# Patient Record
Sex: FEMALE | Race: WHITE | Employment: UNEMPLOYED | URBAN - METROPOLITAN AREA
[De-identification: names, ages, dates, MRNs, and addresses within clinical notes are randomized per-mention and may not be internally consistent; named-entity substitution may affect disease eponyms.]

---

## 2017-05-18 ENCOUNTER — HOSPITAL ENCOUNTER (OUTPATIENT)
Age: 77
Setting detail: SPECIMEN
Discharge: HOME | End: 2017-05-18
Payer: MEDICARE

## 2017-05-18 LAB
ALBUMIN SERPL-MCNC: 3.3 G/DL (ref 3.5–5.2)
ALBUMIN/GLOBULIN RATIO: 1.1 (ref 1–2.5)
ALP BLD-CCNC: 62 U/L (ref 35–104)
ALT SERPL-CCNC: 6 U/L (ref 5–33)
ANION GAP SERPL CALCULATED.3IONS-SCNC: 16 MMOL/L (ref 9–17)
AST SERPL-CCNC: 15 U/L
BILIRUB SERPL-MCNC: 0.35 MG/DL (ref 0.3–1.2)
BILIRUBIN DIRECT: <0.08 MG/DL
BILIRUBIN, INDIRECT: ABNORMAL MG/DL (ref 0–1)
BUN BLDV-MCNC: 16 MG/DL (ref 8–23)
BUN/CREAT BLD: ABNORMAL (ref 9–20)
CALCIUM SERPL-MCNC: 8.8 MG/DL (ref 8.6–10.4)
CHLORIDE BLD-SCNC: 105 MMOL/L (ref 98–107)
CHOLESTEROL/HDL RATIO: 2.7
CHOLESTEROL: 120 MG/DL
CO2: 23 MMOL/L (ref 20–31)
CREAT SERPL-MCNC: 0.81 MG/DL (ref 0.5–0.9)
ESTIMATED AVERAGE GLUCOSE: 74 MG/DL
GFR AFRICAN AMERICAN: >60 ML/MIN
GFR NON-AFRICAN AMERICAN: >60 ML/MIN
GFR SERPL CREATININE-BSD FRML MDRD: ABNORMAL ML/MIN/{1.73_M2}
GFR SERPL CREATININE-BSD FRML MDRD: ABNORMAL ML/MIN/{1.73_M2}
GLOBULIN: ABNORMAL G/DL (ref 1.5–3.8)
GLUCOSE BLD-MCNC: 69 MG/DL (ref 70–99)
HAV IGM SER IA-ACNC: NONREACTIVE
HBA1C MFR BLD: 4.2 % (ref 4–6)
HCT VFR BLD CALC: 28.1 % (ref 36–46)
HDLC SERPL-MCNC: 45 MG/DL
HEMOGLOBIN: 9.2 G/DL (ref 12–16)
HEPATITIS B CORE IGM ANTIBODY: NONREACTIVE
HEPATITIS B SURFACE ANTIGEN: NONREACTIVE
HEPATITIS C ANTIBODY: NONREACTIVE
LDL CHOLESTEROL: 55 MG/DL (ref 0–130)
MCH RBC QN AUTO: 32.5 PG (ref 26–34)
MCHC RBC AUTO-ENTMCNC: 32.9 G/DL (ref 31–37)
MCV RBC AUTO: 98.7 FL (ref 80–100)
PDW BLD-RTO: 17.9 % (ref 12.5–15.4)
PLATELET # BLD: 133 K/UL (ref 140–450)
PMV BLD AUTO: 8 FL (ref 6–12)
POTASSIUM SERPL-SCNC: 4.1 MMOL/L (ref 3.7–5.3)
RBC # BLD: 2.84 M/UL (ref 4–5.2)
SODIUM BLD-SCNC: 144 MMOL/L (ref 135–144)
THYROXINE, FREE: 0.71 NG/DL (ref 0.93–1.7)
TOTAL PROTEIN: 6.2 G/DL (ref 6.4–8.3)
TRIGL SERPL-MCNC: 98 MG/DL
TSH SERPL DL<=0.05 MIU/L-ACNC: 1.17 MIU/L (ref 0.3–5)
VLDLC SERPL CALC-MCNC: NORMAL MG/DL (ref 1–30)
WBC # BLD: 2.3 K/UL (ref 3.5–11)

## 2017-05-18 PROCEDURE — 36415 COLL VENOUS BLD VENIPUNCTURE: CPT

## 2017-05-18 PROCEDURE — 84443 ASSAY THYROID STIM HORMONE: CPT

## 2017-05-18 PROCEDURE — 84439 ASSAY OF FREE THYROXINE: CPT

## 2017-05-18 PROCEDURE — P9603 ONE-WAY ALLOW PRORATED MILES: HCPCS

## 2017-05-18 PROCEDURE — 80061 LIPID PANEL: CPT

## 2017-05-18 PROCEDURE — 83036 HEMOGLOBIN GLYCOSYLATED A1C: CPT

## 2017-05-18 PROCEDURE — 80076 HEPATIC FUNCTION PANEL: CPT

## 2017-05-18 PROCEDURE — 80048 BASIC METABOLIC PNL TOTAL CA: CPT

## 2017-05-18 PROCEDURE — 80074 ACUTE HEPATITIS PANEL: CPT

## 2017-05-18 PROCEDURE — 85027 COMPLETE CBC AUTOMATED: CPT

## 2017-05-30 ENCOUNTER — HOSPITAL ENCOUNTER (OUTPATIENT)
Age: 77
Setting detail: SPECIMEN
Discharge: HOME | End: 2017-05-30
Payer: MEDICARE

## 2017-05-30 LAB
HCT VFR BLD CALC: 29.6 % (ref 36–46)
HEMOGLOBIN: 10 G/DL (ref 12–16)
MCH RBC QN AUTO: 32.9 PG (ref 26–34)
MCHC RBC AUTO-ENTMCNC: 33.8 G/DL (ref 31–37)
MCV RBC AUTO: 97.5 FL (ref 80–100)
PDW BLD-RTO: 17.3 % (ref 12.5–15.4)
PLATELET # BLD: 133 K/UL (ref 140–450)
PMV BLD AUTO: 7.7 FL (ref 6–12)
RBC # BLD: 3.03 M/UL (ref 4–5.2)
WBC # BLD: 3.1 K/UL (ref 3.5–11)

## 2017-05-30 PROCEDURE — P9603 ONE-WAY ALLOW PRORATED MILES: HCPCS

## 2017-05-30 PROCEDURE — 36415 COLL VENOUS BLD VENIPUNCTURE: CPT

## 2017-05-30 PROCEDURE — 85027 COMPLETE CBC AUTOMATED: CPT

## 2017-09-29 ENCOUNTER — HOSPITAL ENCOUNTER (OUTPATIENT)
Age: 77
Setting detail: SPECIMEN
Discharge: HOME | End: 2017-09-29
Payer: MEDICARE

## 2017-09-29 LAB
BUN BLDV-MCNC: 11 MG/DL (ref 8–23)
C-REACTIVE PROTEIN: 0.8 MG/L (ref 0–5)
CREAT SERPL-MCNC: 0.73 MG/DL (ref 0.5–0.9)
GFR AFRICAN AMERICAN: >60 ML/MIN
GFR NON-AFRICAN AMERICAN: >60 ML/MIN
GFR SERPL CREATININE-BSD FRML MDRD: NORMAL ML/MIN/{1.73_M2}
GFR SERPL CREATININE-BSD FRML MDRD: NORMAL ML/MIN/{1.73_M2}
SEDIMENTATION RATE, ERYTHROCYTE: 18 MM (ref 0–20)

## 2017-09-29 PROCEDURE — 86140 C-REACTIVE PROTEIN: CPT

## 2017-09-29 PROCEDURE — 82565 ASSAY OF CREATININE: CPT

## 2017-09-29 PROCEDURE — P9603 ONE-WAY ALLOW PRORATED MILES: HCPCS

## 2017-09-29 PROCEDURE — 84520 ASSAY OF UREA NITROGEN: CPT

## 2017-09-29 PROCEDURE — 85651 RBC SED RATE NONAUTOMATED: CPT

## 2017-09-29 PROCEDURE — 36415 COLL VENOUS BLD VENIPUNCTURE: CPT

## 2017-11-24 ENCOUNTER — HOSPITAL ENCOUNTER (OUTPATIENT)
Age: 77
Setting detail: SPECIMEN
Discharge: HOME | End: 2017-11-24
Payer: MEDICARE

## 2017-11-24 LAB
ABSOLUTE RETIC #: 0.08 M/UL (ref 0.02–0.1)
ALBUMIN SERPL-MCNC: 3.6 G/DL (ref 3.5–5.2)
ALBUMIN/GLOBULIN RATIO: 1.2 (ref 1–2.5)
ALP BLD-CCNC: 53 U/L (ref 35–104)
ALT SERPL-CCNC: <5 U/L (ref 5–33)
ANION GAP SERPL CALCULATED.3IONS-SCNC: 12 MMOL/L (ref 9–17)
AST SERPL-CCNC: 14 U/L
BILIRUB SERPL-MCNC: 0.26 MG/DL (ref 0.3–1.2)
BUN BLDV-MCNC: 18 MG/DL (ref 8–23)
BUN/CREAT BLD: ABNORMAL (ref 9–20)
CALCIUM SERPL-MCNC: 8.6 MG/DL (ref 8.6–10.4)
CHLORIDE BLD-SCNC: 107 MMOL/L (ref 98–107)
CO2: 26 MMOL/L (ref 20–31)
CREAT SERPL-MCNC: 0.75 MG/DL (ref 0.5–0.9)
DAT, POLYSPECIFIC: NEGATIVE
GFR AFRICAN AMERICAN: >60 ML/MIN
GFR NON-AFRICAN AMERICAN: >60 ML/MIN
GFR SERPL CREATININE-BSD FRML MDRD: ABNORMAL ML/MIN/{1.73_M2}
GFR SERPL CREATININE-BSD FRML MDRD: ABNORMAL ML/MIN/{1.73_M2}
GLUCOSE BLD-MCNC: 82 MG/DL (ref 70–99)
HAPTOGLOBIN: 19 MG/DL (ref 30–200)
HCT VFR BLD CALC: 33.4 % (ref 36–46)
HEMOGLOBIN: 11.4 G/DL (ref 12–16)
IMMATURE RETIC FRACT: ABNORMAL %
INR BLD: 0.9
MCH RBC QN AUTO: 32.2 PG (ref 26–34)
MCHC RBC AUTO-ENTMCNC: 34 G/DL (ref 31–37)
MCV RBC AUTO: 94.8 FL (ref 80–100)
PARTIAL THROMBOPLASTIN TIME: 21.7 SEC (ref 23–31)
PDW BLD-RTO: 15.1 % (ref 11.5–14.5)
PLATELET # BLD: 130 K/UL (ref 130–400)
PMV BLD AUTO: 7.7 FL (ref 6–12)
POTASSIUM SERPL-SCNC: 4.2 MMOL/L (ref 3.7–5.3)
PROTHROMBIN TIME: 9.7 SEC (ref 9.7–11.6)
RBC # BLD: 3.52 M/UL (ref 4–5.2)
RETIC %: 2.3 % (ref 0.5–2)
RETIC HEMOGLOBIN: ABNORMAL PG (ref 28.2–35.7)
SODIUM BLD-SCNC: 145 MMOL/L (ref 135–144)
TOTAL PROTEIN: 6.5 G/DL (ref 6.4–8.3)
WBC # BLD: 2.3 K/UL (ref 3.5–11)

## 2017-11-24 PROCEDURE — 85730 THROMBOPLASTIN TIME PARTIAL: CPT

## 2017-11-24 PROCEDURE — 86880 COOMBS TEST DIRECT: CPT

## 2017-11-24 PROCEDURE — 80053 COMPREHEN METABOLIC PANEL: CPT

## 2017-11-24 PROCEDURE — 85610 PROTHROMBIN TIME: CPT

## 2017-11-24 PROCEDURE — 85045 AUTOMATED RETICULOCYTE COUNT: CPT

## 2017-11-24 PROCEDURE — 85027 COMPLETE CBC AUTOMATED: CPT

## 2017-11-24 PROCEDURE — 86356 MONONUCLEAR CELL ANTIGEN: CPT

## 2017-11-24 PROCEDURE — 36415 COLL VENOUS BLD VENIPUNCTURE: CPT

## 2017-11-24 PROCEDURE — P9603 ONE-WAY ALLOW PRORATED MILES: HCPCS

## 2017-11-24 PROCEDURE — 83010 ASSAY OF HAPTOGLOBIN QUANT: CPT

## 2017-11-26 LAB — PERCENT PNH CELLS: 0 % (ref 0–0)

## 2017-11-29 ENCOUNTER — HOSPITAL ENCOUNTER (OUTPATIENT)
Dept: CT IMAGING | Age: 77
Discharge: HOME | End: 2017-11-29
Payer: MEDICARE

## 2017-11-29 VITALS
HEIGHT: 59 IN | HEART RATE: 74 BPM | OXYGEN SATURATION: 95 % | BODY MASS INDEX: 26.31 KG/M2 | RESPIRATION RATE: 14 BRPM | TEMPERATURE: 97.2 F | DIASTOLIC BLOOD PRESSURE: 71 MMHG | SYSTOLIC BLOOD PRESSURE: 162 MMHG | WEIGHT: 130.51 LBS

## 2017-11-29 DIAGNOSIS — Z98.890 S/P BIOPSY: ICD-10-CM

## 2017-11-29 LAB
ABSOLUTE EOS #: 0.1 K/UL (ref 0–0.4)
ABSOLUTE IMMATURE GRANULOCYTE: ABNORMAL K/UL (ref 0–0.3)
ABSOLUTE LYMPH #: 0.6 K/UL (ref 1–4.8)
ABSOLUTE MONO #: 0.3 K/UL (ref 0.2–0.8)
ABSOLUTE RETIC #: 0.1 M/UL (ref 0.02–0.1)
BASOPHILS # BLD: 0 % (ref 0–2)
BASOPHILS ABSOLUTE: 0 K/UL (ref 0–0.2)
DIFFERENTIAL TYPE: ABNORMAL
EOSINOPHILS RELATIVE PERCENT: 5 % (ref 1–4)
HCT VFR BLD CALC: 33.3 % (ref 36–46)
HEMOGLOBIN: 11.2 G/DL (ref 12–16)
IMMATURE GRANULOCYTES: ABNORMAL %
IMMATURE RETIC FRACT: ABNORMAL %
INR BLD: 0.9
LYMPHOCYTES # BLD: 26 % (ref 24–44)
MCH RBC QN AUTO: 32.1 PG (ref 26–34)
MCHC RBC AUTO-ENTMCNC: 33.7 G/DL (ref 31–37)
MCV RBC AUTO: 95.3 FL (ref 80–100)
MONOCYTES # BLD: 12 % (ref 1–7)
PARTIAL THROMBOPLASTIN TIME: 28.3 SEC (ref 23–31)
PDW BLD-RTO: 15.6 % (ref 11.5–14.5)
PLATELET # BLD: 138 K/UL (ref 130–400)
PLATELET # BLD: 141 K/UL (ref 130–400)
PLATELET ESTIMATE: ABNORMAL
PMV BLD AUTO: 8.3 FL (ref 6–12)
PROTHROMBIN TIME: 9.7 SEC (ref 9.7–11.6)
RBC # BLD: 3.5 M/UL (ref 4–5.2)
RBC # BLD: ABNORMAL 10*6/UL
RETIC %: 2.8 % (ref 0.5–2)
RETIC HEMOGLOBIN: ABNORMAL PG (ref 28.2–35.7)
SEG NEUTROPHILS: 57 % (ref 36–66)
SEGMENTED NEUTROPHILS ABSOLUTE COUNT: 1.3 K/UL (ref 1.8–7.7)
WBC # BLD: 2.4 K/UL (ref 3.5–11)
WBC # BLD: ABNORMAL 10*3/UL

## 2017-11-29 PROCEDURE — 85610 PROTHROMBIN TIME: CPT

## 2017-11-29 PROCEDURE — 88264 CHROMOSOME ANALYSIS 20-25: CPT

## 2017-11-29 PROCEDURE — 88311 DECALCIFY TISSUE: CPT

## 2017-11-29 PROCEDURE — 88237 TISSUE CULTURE BONE MARROW: CPT

## 2017-11-29 PROCEDURE — 88313 SPECIAL STAINS GROUP 2: CPT

## 2017-11-29 PROCEDURE — 88185 FLOWCYTOMETRY/TC ADD-ON: CPT

## 2017-11-29 PROCEDURE — 88184 FLOWCYTOMETRY/ TC 1 MARKER: CPT

## 2017-11-29 PROCEDURE — 38221 DX BONE MARROW BIOPSIES: CPT

## 2017-11-29 PROCEDURE — 85025 COMPLETE CBC W/AUTO DIFF WBC: CPT

## 2017-11-29 PROCEDURE — 88305 TISSUE EXAM BY PATHOLOGIST: CPT

## 2017-11-29 PROCEDURE — 2580000003 HC RX 258: Performed by: RADIOLOGY

## 2017-11-29 PROCEDURE — 85730 THROMBOPLASTIN TIME PARTIAL: CPT

## 2017-11-29 PROCEDURE — 88280 CHROMOSOME KARYOTYPE STUDY: CPT

## 2017-11-29 PROCEDURE — 6360000002 HC RX W HCPCS: Performed by: RADIOLOGY

## 2017-11-29 PROCEDURE — 85045 AUTOMATED RETICULOCYTE COUNT: CPT

## 2017-11-29 PROCEDURE — 7100000010 HC PHASE II RECOVERY - FIRST 15 MIN

## 2017-11-29 PROCEDURE — 77012 CT SCAN FOR NEEDLE BIOPSY: CPT

## 2017-11-29 PROCEDURE — 7100000011 HC PHASE II RECOVERY - ADDTL 15 MIN

## 2017-11-29 RX ORDER — CARVEDILOL 6.25 MG/1
6.25 TABLET ORAL 2 TIMES DAILY WITH MEALS
COMMUNITY

## 2017-11-29 RX ORDER — DIVALPROEX SODIUM 125 MG/1
125 TABLET, DELAYED RELEASE ORAL 3 TIMES DAILY
COMMUNITY
End: 2019-02-04 | Stop reason: ALTCHOICE

## 2017-11-29 RX ORDER — TRAZODONE HYDROCHLORIDE 50 MG/1
50 TABLET ORAL NIGHTLY
Status: ON HOLD | COMMUNITY
End: 2018-05-08 | Stop reason: HOSPADM

## 2017-11-29 RX ORDER — SODIUM CHLORIDE 0.9 % (FLUSH) 0.9 %
10 SYRINGE (ML) INJECTION PRN
Status: DISCONTINUED | OUTPATIENT
Start: 2017-11-29 | End: 2017-12-02 | Stop reason: HOSPADM

## 2017-11-29 RX ORDER — FENTANYL CITRATE 50 UG/ML
INJECTION, SOLUTION INTRAMUSCULAR; INTRAVENOUS
Status: COMPLETED | OUTPATIENT
Start: 2017-11-29 | End: 2017-11-29

## 2017-11-29 RX ORDER — ACETAMINOPHEN 325 MG/1
650 TABLET ORAL EVERY 4 HOURS PRN
Status: DISCONTINUED | OUTPATIENT
Start: 2017-11-29 | End: 2017-12-02 | Stop reason: HOSPADM

## 2017-11-29 RX ORDER — GABAPENTIN 300 MG/1
300 CAPSULE ORAL 2 TIMES DAILY
COMMUNITY

## 2017-11-29 RX ORDER — SODIUM CHLORIDE 9 MG/ML
INJECTION, SOLUTION INTRAVENOUS CONTINUOUS
Status: DISCONTINUED | OUTPATIENT
Start: 2017-11-29 | End: 2017-12-02 | Stop reason: HOSPADM

## 2017-11-29 RX ORDER — CHOLECALCIFEROL (VITAMIN D3) 125 MCG
500 CAPSULE ORAL DAILY
COMMUNITY

## 2017-11-29 RX ADMIN — FENTANYL CITRATE 50 MCG: 50 INJECTION, SOLUTION INTRAMUSCULAR; INTRAVENOUS at 11:00

## 2017-11-29 RX ADMIN — FENTANYL CITRATE 50 MCG: 50 INJECTION, SOLUTION INTRAMUSCULAR; INTRAVENOUS at 10:51

## 2017-11-29 RX ADMIN — SODIUM CHLORIDE: 9 INJECTION, SOLUTION INTRAVENOUS at 09:00

## 2017-11-29 ASSESSMENT — PAIN SCALES - GENERAL
PAINLEVEL_OUTOF10: 0

## 2017-11-29 ASSESSMENT — PAIN - FUNCTIONAL ASSESSMENT: PAIN_FUNCTIONAL_ASSESSMENT: 0-10

## 2017-11-29 NOTE — BRIEF OP NOTE
Brief Postoperative Note    Jennifer Gonzalez  YOB: 1940  [id number]    Pre-operative Diagnosis: Anemia. MALT lymphoma.     Post-operative Diagnosis: Same    Procedure: BM aspiration/biopsy    Anesthesia: Local + Fentanyl 100 ug IV for pain    Surgeons/Assistants: Ritu Mohamud MD    Estimated Blood Loss: less than 50     Complications: None    Specimens: Was Obtained: 12 mls BM aspirate w and w/out heparin and BM biopsy performed    Findings: 11 g On Control device used posterior left iliac bone    Electronically signed by Ritu Mohamud MD on 11/29/2017 at 11:12 AM

## 2017-11-29 NOTE — H&P
HISTORY and PHYSICAL    NAME:  Malinda Verma  MRN: [id number]   YOB: 1940   Date: 11/29/2017   Age: 78 y.o. Gender: female         COMPLAINT AND PRESENT HISTORY: Malinda Verma is a 78 y.o. female who is scheduled to have a CT biopsy of the bone marrow in  by Dr. Elena Pelletier due to abnormal blood work. She denies any chest pain, dyspnea, light headedness or fever or chills. She states she did not take her respiratory medications this AM that included her inhaler. She currently has diffuse mild expiratory wheezing and denies any dyspnea. She has a history of MRSA in the past.    Diagnosis:  Abnormal lesion      Past Medical History:   Diagnosis Date    Airway obstruction     Angina effort ([address]     Anxiety     CAD (coronary artery disease)     s/p stents    Cancer ([address]     breast, right    Convulsions ([address]     COPD (chronic obstructive pulmonary disease) ([address]     Degenerative disc disease     Cervical    Depression     Depression     Diabetes mellitus ([address]     Esophageal reflux     Hypertension     unspecified    Inflammatory spondylopathy ([address]     Iron deficiency     Lymphoma ([address] 1993    MDRO (multiple drug resistant organisms) resistance 8/10/2014    E.  Coli urine    MRSA (methicillin resistant staph aureus) culture positive 07/13/2016    nares    Neuropathy ([address]     Osteoarthritis     Parkinson's disease ([address]     Peripheral vascular disease ([address]     Restless leg syndrome     Sleep apnea     Transient cerebral ischemia     Unsteady gait        Past Surgical History:   Procedure Laterality Date    BREAST LUMPECTOMY      right     CHOLECYSTECTOMY      CORONARY ANGIOPLASTY WITH STENT PLACEMENT      FEMORAL-FEMORAL BYPASS GRAFT      HYSTERECTOMY      LUNG REMOVAL, PARTIAL      Lung CA        Pt denies any history of  stroke     Family History   Problem Relation Age of Onset    Kidney Disease Mother     Cancer Mother     Heart Disease Father    [facility] rashes. No lesions. No easy bruising or bleeding. HEENT:  Denies cephalgia or head injury. No eye or ear pain. No sinusitis, rhinorhea or epistaxis. No frequent sorethroat, dysphagia or hoarseness. No masses, pain, stiffness or injury to the neck. Cardio-Respiratory: No hemoptysis, shortness of breath, chest pain, dizziness, orthopnea or palpitations. Gastrointestinal:  No constipation, diarrhea, brannon stools, red or black in the stool. No nausea or vomiting. Genitourinary:  No dysuria, hematuria, discharge, incontinence. No recent urinary tract infection. Locomotor:  Denies bone, joint or muscle  problems. No need for assistance with ambulation. Neuro:  Denies  neuropathy, syncopal episodes, weakness, vertigo, arthralgia, myalgia or numbness or tingling. Behavioral/Psych:  Pt denies current feeling of depression or significant anxiety. GENERAL PHYSICAL EXAM:            Vitals: BP (!) 156/55   Pulse 76   Temp 98.5 °F (36.9 °C) (Oral)   Resp 18   Ht 4' 11\" (1.499 m)   Wt 130 lb 8.2 oz (59.2 kg)   SpO2 92%   BMI 26.36 kg/m²  Body mass index is 26.36 kg/m². GENERAL APPEARANCE:  Yves Alonzo is a 78 y.o. female,  nourished, conscious, alert. Does not appear to be in distress or pain at this time. Skin:  Appears warm and dry without jaundice or cyanosis. No rashes or lesions. HEENT:  No facial swelling or tenderness. No  scleral icterus. No drainage from the ears, eyes or nose. Moist mucous membranes. No jugular vein distention. Carotid pulses present without bruit. Chest:  Symmetrical with equal expansion. Heart:  Regular rate and rhythm without murmur or rub. No extra heart sounds. Lungs: Clear to ausculation with faint scattered wheezing. No rhonchi or crackles. Nonlabored. Abdomen:  Soft, nontender. No flank pain with percussion.      Lymphatics:  No palpable cervical or supraclavicular lymphadenopathy. Extremities:  Radial pulses 2+. Pedal pulses 2+. No edema. No calf tenderness. Negative willies sign. Locomotor/ Back/Spine:  No para spinus tenderness. 5/5 strength upper and lower extremities. Neurological/Behavioral  Alert and oriented. Able to move all extremities. No facial droop. No slurred speech. Cranial nerves 2-12  grossly intact.                           Patient Active Problem List    Diagnosis Date Noted    Chest pain 12/15/2013     Priority: High    Other dyspnea and respiratory abnormality     Acute respiratory failure with hypoxia and hypercapnia (HCC) 07/13/2016    Medication overdose 07/13/2016    Acute lower UTI (urinary tract infection) 07/13/2016    Anemia 07/13/2016    Somnolence 07/13/2016    Intractable low back pain 12/34/7011    Metabolic encephalopathy 80/47/7580    Acute cystitis 09/10/2015    Coronary artery disease involving native coronary artery with unstable angina pectoris ([address]     Essential hypertension     Lower urinary tract infectious disease 08/22/2015    Abnormal stress test 08/22/2015    Hypokalemia 08/22/2015    Jaw pain - Suspected Anginal Equilvant 08/21/2015    Thoracic back pain 03/24/2015    Shortness of breath 03/24/2015    Carotid stenosis 12/21/2014    Unstable angina ([address] 05/23/2014    Leg swelling 05/23/2014    CAD (coronary artery disease) 03/22/2014    Anxiety 12/15/2013    COPD (chronic obstructive pulmonary disease) ([address] 12/15/2013    Iron deficiency anemia 12/15/2013    Depression 12/82/5315    Alcoholic peripheral neuropathy ([address] 12/15/2013    Parkinson's disease ([address] 12/15/2013    Restless leg syndrome 12/15/2013    Altered mental status 12/15/2013    Pneumonia 12/15/2013               Seema Montanez CNP on 11/29/2017 at 9:15 AM

## 2017-11-29 NOTE — PROGRESS NOTES
1340: Updated Roxi Kumar RN regarding pt's blood pressure of 184/68. Eda Chi stated she would updated Dr. Andrea Sarmiento. Pt responds to voice and is oriented. Pt denies complaints of pain. Waiting for response from Dr. Andrea Sarmiento. 1418: Dr. Andrea Sarmiento instructed not to treat the pt's blood pressure and to send the pt back to [facility] at this time. Pt's current blood pressure is 168/59.    1443: Pt was transported back to [facility] per [employer] transportation. Pt's belongings were taken back to [facility] with her except for her wheelchair. 1458: [facility] transportation picked up the pt's wheelchair to take it back to [facility]. 1505:  Report called to Keyona Domingo LPN at [facility]. [facility] stated she had no further questions regarding the pt.

## 2017-11-30 LAB — BONE MARROW REPORT: NORMAL

## 2017-12-01 LAB — FLOW CYTOMETRY BL: NORMAL

## 2017-12-06 LAB — CHROMOSOME STUDY: NORMAL

## 2018-01-24 ENCOUNTER — HOSPITAL ENCOUNTER (OUTPATIENT)
Age: 78
Setting detail: SPECIMEN
Discharge: HOME | End: 2018-01-24
Payer: MEDICARE

## 2018-01-24 LAB
ABSOLUTE EOS #: 0.15 K/UL (ref 0–0.44)
ABSOLUTE IMMATURE GRANULOCYTE: 0.02 K/UL (ref 0–0.3)
ABSOLUTE LYMPH #: 0.84 K/UL (ref 1.1–3.7)
ABSOLUTE MONO #: 0.27 K/UL (ref 0.1–1.2)
BASOPHILS # BLD: 1 % (ref 0–2)
BASOPHILS ABSOLUTE: 0.02 K/UL (ref 0–0.2)
DIFFERENTIAL TYPE: ABNORMAL
EOSINOPHILS RELATIVE PERCENT: 7 % (ref 1–4)
FERRITIN: 261 UG/L (ref 13–150)
HCT VFR BLD CALC: 33.2 % (ref 36.3–47.1)
HEMOGLOBIN: 10.2 G/DL (ref 11.9–15.1)
IMMATURE GRANULOCYTES: 1 %
LYMPHOCYTES # BLD: 40 % (ref 24–43)
MCH RBC QN AUTO: 32.2 PG (ref 25.2–33.5)
MCHC RBC AUTO-ENTMCNC: 30.7 G/DL (ref 28.4–34.8)
MCV RBC AUTO: 104.7 FL (ref 82.6–102.9)
MONOCYTES # BLD: 13 % (ref 3–12)
MORPHOLOGY: ABNORMAL
NRBC AUTOMATED: 0 PER 100 WBC
PDW BLD-RTO: 14.6 % (ref 11.8–14.4)
PLATELET # BLD: 94 K/UL (ref 138–453)
PLATELET ESTIMATE: ABNORMAL
PMV BLD AUTO: 11 FL (ref 8.1–13.5)
RBC # BLD: 3.17 M/UL (ref 3.95–5.11)
RBC # BLD: ABNORMAL 10*6/UL
SEG NEUTROPHILS: 38 % (ref 36–65)
SEGMENTED NEUTROPHILS ABSOLUTE COUNT: 0.8 K/UL (ref 1.5–8.1)
WBC # BLD: 2.1 K/UL (ref 3.5–11.3)
WBC # BLD: ABNORMAL 10*3/UL

## 2018-01-24 PROCEDURE — 82728 ASSAY OF FERRITIN: CPT

## 2018-01-24 PROCEDURE — 36415 COLL VENOUS BLD VENIPUNCTURE: CPT

## 2018-01-24 PROCEDURE — P9603 ONE-WAY ALLOW PRORATED MILES: HCPCS

## 2018-01-24 PROCEDURE — 85025 COMPLETE CBC W/AUTO DIFF WBC: CPT

## 2018-01-31 ENCOUNTER — HOSPITAL ENCOUNTER (OUTPATIENT)
Age: 78
Setting detail: SPECIMEN
Discharge: HOME | End: 2018-01-31
Payer: MEDICARE

## 2018-01-31 LAB
ANION GAP SERPL CALCULATED.3IONS-SCNC: 12 MMOL/L (ref 9–17)
BUN BLDV-MCNC: 11 MG/DL (ref 8–23)
BUN/CREAT BLD: ABNORMAL (ref 9–20)
CALCIUM SERPL-MCNC: 8.4 MG/DL (ref 8.6–10.4)
CHLORIDE BLD-SCNC: 102 MMOL/L (ref 98–107)
CO2: 29 MMOL/L (ref 20–31)
CREAT SERPL-MCNC: 0.77 MG/DL (ref 0.5–0.9)
GFR AFRICAN AMERICAN: >60 ML/MIN
GFR NON-AFRICAN AMERICAN: >60 ML/MIN
GFR SERPL CREATININE-BSD FRML MDRD: ABNORMAL ML/MIN/{1.73_M2}
GFR SERPL CREATININE-BSD FRML MDRD: ABNORMAL ML/MIN/{1.73_M2}
GLUCOSE BLD-MCNC: 69 MG/DL (ref 70–99)
HCT VFR BLD CALC: 30.6 % (ref 36.3–47.1)
HEMOGLOBIN: 9.7 G/DL (ref 11.9–15.1)
MCH RBC QN AUTO: 31.9 PG (ref 25.2–33.5)
MCHC RBC AUTO-ENTMCNC: 31.7 G/DL (ref 28.4–34.8)
MCV RBC AUTO: 100.7 FL (ref 82.6–102.9)
NRBC AUTOMATED: 0 PER 100 WBC
PDW BLD-RTO: 14.5 % (ref 11.8–14.4)
PLATELET # BLD: 122 K/UL (ref 138–453)
PMV BLD AUTO: 10.5 FL (ref 8.1–13.5)
POTASSIUM SERPL-SCNC: 3.8 MMOL/L (ref 3.7–5.3)
RBC # BLD: 3.04 M/UL (ref 3.95–5.11)
SODIUM BLD-SCNC: 143 MMOL/L (ref 135–144)
WBC # BLD: 2 K/UL (ref 3.5–11.3)

## 2018-01-31 PROCEDURE — 85027 COMPLETE CBC AUTOMATED: CPT

## 2018-01-31 PROCEDURE — P9603 ONE-WAY ALLOW PRORATED MILES: HCPCS

## 2018-01-31 PROCEDURE — 80048 BASIC METABOLIC PNL TOTAL CA: CPT

## 2018-01-31 PROCEDURE — 36415 COLL VENOUS BLD VENIPUNCTURE: CPT

## 2018-02-01 ENCOUNTER — HOSPITAL ENCOUNTER (OUTPATIENT)
Age: 78
Setting detail: SPECIMEN
Discharge: HOME | End: 2018-02-01
Payer: MEDICARE

## 2018-02-01 PROCEDURE — 87328 CRYPTOSPORIDIUM AG IA: CPT

## 2018-02-01 PROCEDURE — 87329 GIARDIA AG IA: CPT

## 2018-02-02 LAB
DIRECT EXAM: NORMAL
Lab: NORMAL
SPECIMEN DESCRIPTION: NORMAL
SPECIMEN DESCRIPTION: NORMAL
STATUS: NORMAL

## 2018-02-08 ENCOUNTER — HOSPITAL ENCOUNTER (OUTPATIENT)
Age: 78
Setting detail: SPECIMEN
Discharge: HOME | End: 2018-02-08
Payer: MEDICARE

## 2018-02-08 LAB
ABSOLUTE EOS #: 0.11 K/UL (ref 0–0.4)
ABSOLUTE IMMATURE GRANULOCYTE: 0 K/UL (ref 0–0.3)
ABSOLUTE LYMPH #: 0.92 K/UL (ref 1–4.8)
ABSOLUTE MONO #: 0.11 K/UL (ref 0.1–0.8)
BASOPHILS # BLD: 0 % (ref 0–2)
BASOPHILS ABSOLUTE: 0 K/UL (ref 0–0.2)
DIFFERENTIAL TYPE: ABNORMAL
EOSINOPHILS RELATIVE PERCENT: 5 % (ref 1–4)
HCT VFR BLD CALC: 29.8 % (ref 36.3–47.1)
HEMOGLOBIN: 9.4 G/DL (ref 11.9–15.1)
IMMATURE GRANULOCYTES: 0 %
LYMPHOCYTES # BLD: 44 % (ref 24–44)
MCH RBC QN AUTO: 32.2 PG (ref 25.2–33.5)
MCHC RBC AUTO-ENTMCNC: 31.5 G/DL (ref 28.4–34.8)
MCV RBC AUTO: 102.1 FL (ref 82.6–102.9)
MONOCYTES # BLD: 5 % (ref 1–7)
MORPHOLOGY: ABNORMAL
NRBC AUTOMATED: 0 PER 100 WBC
PDW BLD-RTO: 14.8 % (ref 11.8–14.4)
PLATELET # BLD: ABNORMAL K/UL (ref 138–453)
PLATELET ESTIMATE: ABNORMAL
PLATELET, FLUORESCENCE: NORMAL K/UL (ref 138–453)
PLATELET, IMMATURE FRACTION: NORMAL % (ref 1.1–10.3)
PMV BLD AUTO: ABNORMAL FL (ref 8.1–13.5)
RBC # BLD: 2.92 M/UL (ref 3.95–5.11)
RBC # BLD: ABNORMAL 10*6/UL
SEG NEUTROPHILS: 46 % (ref 36–66)
SEGMENTED NEUTROPHILS ABSOLUTE COUNT: 0.96 K/UL (ref 1.8–7.7)
WBC # BLD: 2.1 K/UL (ref 3.5–11.3)
WBC # BLD: ABNORMAL 10*3/UL

## 2018-02-08 PROCEDURE — 85055 RETICULATED PLATELET ASSAY: CPT

## 2018-02-08 PROCEDURE — 36415 COLL VENOUS BLD VENIPUNCTURE: CPT

## 2018-02-08 PROCEDURE — P9603 ONE-WAY ALLOW PRORATED MILES: HCPCS

## 2018-02-08 PROCEDURE — 85025 COMPLETE CBC W/AUTO DIFF WBC: CPT

## 2018-02-24 ENCOUNTER — HOSPITAL ENCOUNTER (OUTPATIENT)
Age: 78
Setting detail: SPECIMEN
Discharge: HOME | DRG: 190 | End: 2018-02-24
Payer: MEDICARE

## 2018-02-24 LAB
ABSOLUTE EOS #: 0.09 K/UL (ref 0–0.4)
ABSOLUTE IMMATURE GRANULOCYTE: ABNORMAL K/UL (ref 0–0.3)
ABSOLUTE LYMPH #: 0.72 K/UL (ref 1–4.8)
ABSOLUTE MONO #: 0.27 K/UL (ref 0.2–0.8)
BASOPHILS # BLD: 1 %
BASOPHILS ABSOLUTE: 0.02 K/UL (ref 0–0.2)
DIFFERENTIAL TYPE: ABNORMAL
EOSINOPHILS RELATIVE PERCENT: 5 % (ref 1–4)
HCT VFR BLD CALC: 28.8 % (ref 36–46)
HEMOGLOBIN: 9.5 G/DL (ref 12–16)
IMMATURE GRANULOCYTES: ABNORMAL %
LYMPHOCYTES # BLD: 40 % (ref 24–44)
MCH RBC QN AUTO: 32.7 PG (ref 26–34)
MCHC RBC AUTO-ENTMCNC: 33.1 G/DL (ref 31–37)
MCV RBC AUTO: 98.8 FL (ref 80–100)
MONOCYTES # BLD: 15 % (ref 1–7)
NRBC AUTOMATED: ABNORMAL PER 100 WBC
PDW BLD-RTO: 14.8 % (ref 11.5–14.5)
PLATELET # BLD: 123 K/UL (ref 130–400)
PLATELET ESTIMATE: ABNORMAL
PMV BLD AUTO: ABNORMAL FL (ref 6–12)
RBC # BLD: 2.91 M/UL (ref 4–5.2)
RBC # BLD: ABNORMAL 10*6/UL
SEG NEUTROPHILS: 39 % (ref 36–66)
SEGMENTED NEUTROPHILS ABSOLUTE COUNT: 0.7 K/UL (ref 1.8–7.7)
WBC # BLD: 1.8 K/UL (ref 3.5–11)
WBC # BLD: ABNORMAL 10*3/UL

## 2018-02-24 PROCEDURE — P9603 ONE-WAY ALLOW PRORATED MILES: HCPCS

## 2018-02-24 PROCEDURE — 85025 COMPLETE CBC W/AUTO DIFF WBC: CPT

## 2018-02-24 PROCEDURE — 36415 COLL VENOUS BLD VENIPUNCTURE: CPT

## 2018-02-26 ENCOUNTER — HOSPITAL ENCOUNTER (INPATIENT)
Age: 78
LOS: 4 days | Discharge: NURSING, REHAB OR LONG-TERM CARE | DRG: 190 | End: 2018-03-02
Admitting: FAMILY MEDICINE
Payer: MEDICARE

## 2018-02-26 ENCOUNTER — APPOINTMENT (OUTPATIENT)
Dept: GENERAL RADIOLOGY | Age: 78
DRG: 190 | End: 2018-02-26
Payer: MEDICARE

## 2018-02-26 DIAGNOSIS — J44.1 ACUTE EXACERBATION OF CHRONIC OBSTRUCTIVE PULMONARY DISEASE (COPD) (HCC): Primary | ICD-10-CM

## 2018-02-26 LAB
ABSOLUTE EOS #: 0.1 K/UL (ref 0–0.4)
ABSOLUTE IMMATURE GRANULOCYTE: ABNORMAL K/UL (ref 0–0.3)
ABSOLUTE LYMPH #: 0.6 K/UL (ref 1–4.8)
ABSOLUTE MONO #: 0.2 K/UL (ref 0.2–0.8)
ANION GAP SERPL CALCULATED.3IONS-SCNC: 10 MMOL/L (ref 9–17)
BASOPHILS # BLD: 0 % (ref 0–2)
BASOPHILS ABSOLUTE: 0 K/UL (ref 0–0.2)
BUN BLDV-MCNC: 11 MG/DL (ref 8–23)
BUN/CREAT BLD: 15 (ref 9–20)
CALCIUM SERPL-MCNC: 9 MG/DL (ref 8.6–10.4)
CHLORIDE BLD-SCNC: 99 MMOL/L (ref 98–107)
CO2: 35 MMOL/L (ref 20–31)
CREAT SERPL-MCNC: 0.72 MG/DL (ref 0.5–0.9)
DIFFERENTIAL TYPE: ABNORMAL
EOSINOPHILS RELATIVE PERCENT: 4 % (ref 1–4)
FIO2: 28
FIO2: ABNORMAL
GFR AFRICAN AMERICAN: >60 ML/MIN
GFR NON-AFRICAN AMERICAN: >60 ML/MIN
GFR SERPL CREATININE-BSD FRML MDRD: ABNORMAL ML/MIN/{1.73_M2}
GFR SERPL CREATININE-BSD FRML MDRD: ABNORMAL ML/MIN/{1.73_M2}
GLUCOSE BLD-MCNC: 96 MG/DL (ref 70–99)
HCO3 VENOUS: 39.1 MMOL/L (ref 24–30)
HCT VFR BLD CALC: 37.2 % (ref 36–46)
HEMOGLOBIN: 12.5 G/DL (ref 12–16)
IMMATURE GRANULOCYTES: ABNORMAL %
LYMPHOCYTES # BLD: 23 % (ref 24–44)
MAGNESIUM: 2.3 MG/DL (ref 1.6–2.6)
MCH RBC QN AUTO: 32.6 PG (ref 26–34)
MCHC RBC AUTO-ENTMCNC: 33.6 G/DL (ref 31–37)
MCV RBC AUTO: 97.1 FL (ref 80–100)
MONOCYTES # BLD: 9 % (ref 1–7)
NEGATIVE BASE EXCESS, ART: ABNORMAL (ref 0–2)
NEGATIVE BASE EXCESS, VEN: ABNORMAL (ref 0–2)
NRBC AUTOMATED: ABNORMAL PER 100 WBC
O2 DEVICE/FLOW/%: ABNORMAL
O2 DEVICE/FLOW/%: ABNORMAL
O2 SAT, VEN: 44 %
PATIENT TEMP: 37
PATIENT TEMP: ABNORMAL
PCO2, VEN: 72 MM HG (ref 39–55)
PDW BLD-RTO: 14.2 % (ref 11.5–14.5)
PH VENOUS: 7.34 (ref 7.32–7.42)
PLATELET # BLD: 135 K/UL (ref 130–400)
PLATELET ESTIMATE: ABNORMAL
PMV BLD AUTO: ABNORMAL FL (ref 6–12)
PO2, VEN: 27 MM HG (ref 30–50)
POC HCO3: 37.1 MMOL/L (ref 22–27)
POC O2 SATURATION: 91 %
POC PCO2 TEMP: ABNORMAL MM HG
POC PCO2 TEMP: ABNORMAL MM HG
POC PCO2: 67 MM HG (ref 32–45)
POC PH TEMP: ABNORMAL
POC PH TEMP: ABNORMAL
POC PH: 7.36 (ref 7.35–7.45)
POC PO2 TEMP: ABNORMAL MM HG
POC PO2 TEMP: ABNORMAL MM HG
POC PO2: 65 MM HG (ref 75–95)
POSITIVE BASE EXCESS, ART: 12 (ref 0–2)
POSITIVE BASE EXCESS, VEN: 13 (ref 0–2)
POTASSIUM SERPL-SCNC: 3.6 MMOL/L (ref 3.7–5.3)
RBC # BLD: 3.83 M/UL (ref 4–5.2)
RBC # BLD: ABNORMAL 10*6/UL
SEG NEUTROPHILS: 64 % (ref 36–66)
SEGMENTED NEUTROPHILS ABSOLUTE COUNT: 1.8 K/UL (ref 1.8–7.7)
SODIUM BLD-SCNC: 144 MMOL/L (ref 135–144)
TCO2 (CALC), ART: 39 MMOL/L (ref 23–28)
TOTAL CO2, VENOUS: 41 MMOL/L (ref 25–31)
WBC # BLD: 2.7 K/UL (ref 3.5–11)
WBC # BLD: ABNORMAL 10*3/UL

## 2018-02-26 PROCEDURE — 73130 X-RAY EXAM OF HAND: CPT

## 2018-02-26 PROCEDURE — 71045 X-RAY EXAM CHEST 1 VIEW: CPT

## 2018-02-26 PROCEDURE — 83735 ASSAY OF MAGNESIUM: CPT

## 2018-02-26 PROCEDURE — 94640 AIRWAY INHALATION TREATMENT: CPT

## 2018-02-26 PROCEDURE — 2580000003 HC RX 258: Performed by: NURSE PRACTITIONER

## 2018-02-26 PROCEDURE — 2580000003 HC RX 258: Performed by: FAMILY MEDICINE

## 2018-02-26 PROCEDURE — 6360000002 HC RX W HCPCS: Performed by: FAMILY MEDICINE

## 2018-02-26 PROCEDURE — 99284 EMERGENCY DEPT VISIT MOD MDM: CPT

## 2018-02-26 PROCEDURE — 85025 COMPLETE CBC W/AUTO DIFF WBC: CPT

## 2018-02-26 PROCEDURE — 6360000002 HC RX W HCPCS

## 2018-02-26 PROCEDURE — 80048 BASIC METABOLIC PNL TOTAL CA: CPT

## 2018-02-26 PROCEDURE — 6370000000 HC RX 637 (ALT 250 FOR IP): Performed by: FAMILY MEDICINE

## 2018-02-26 PROCEDURE — 96374 THER/PROPH/DIAG INJ IV PUSH: CPT

## 2018-02-26 PROCEDURE — 2060000000 HC ICU INTERMEDIATE R&B

## 2018-02-26 PROCEDURE — 82803 BLOOD GASES ANY COMBINATION: CPT

## 2018-02-26 PROCEDURE — 87086 URINE CULTURE/COLONY COUNT: CPT

## 2018-02-26 PROCEDURE — 36600 WITHDRAWAL OF ARTERIAL BLOOD: CPT

## 2018-02-26 PROCEDURE — 2700000000 HC OXYGEN THERAPY PER DAY

## 2018-02-26 PROCEDURE — 94760 N-INVAS EAR/PLS OXIMETRY 1: CPT

## 2018-02-26 RX ORDER — LANOLIN ALCOHOL/MO/W.PET/CERES
500 CREAM (GRAM) TOPICAL DAILY
Status: DISCONTINUED | OUTPATIENT
Start: 2018-02-26 | End: 2018-03-02 | Stop reason: HOSPADM

## 2018-02-26 RX ORDER — HYDROCODONE BITARTRATE AND ACETAMINOPHEN 5; 325 MG/1; MG/1
1 TABLET ORAL EVERY 4 HOURS PRN
Status: DISCONTINUED | OUTPATIENT
Start: 2018-02-26 | End: 2018-03-02 | Stop reason: HOSPADM

## 2018-02-26 RX ORDER — ONDANSETRON 2 MG/ML
4 INJECTION INTRAMUSCULAR; INTRAVENOUS EVERY 6 HOURS PRN
Status: DISCONTINUED | OUTPATIENT
Start: 2018-02-26 | End: 2018-03-02 | Stop reason: HOSPADM

## 2018-02-26 RX ORDER — POLYETHYLENE GLYCOL 3350 17 G/17G
17 POWDER, FOR SOLUTION ORAL DAILY
Status: DISCONTINUED | OUTPATIENT
Start: 2018-02-26 | End: 2018-03-02 | Stop reason: HOSPADM

## 2018-02-26 RX ORDER — ACETAMINOPHEN 325 MG/1
650 TABLET ORAL EVERY 6 HOURS PRN
COMMUNITY
End: 2019-03-15 | Stop reason: ALTCHOICE

## 2018-02-26 RX ORDER — LEVALBUTEROL 1.25 MG/.5ML
1.25 SOLUTION, CONCENTRATE RESPIRATORY (INHALATION) EVERY 4 HOURS PRN
Status: DISCONTINUED | OUTPATIENT
Start: 2018-02-26 | End: 2018-02-26

## 2018-02-26 RX ORDER — HYDROCODONE BITARTRATE AND ACETAMINOPHEN 5; 325 MG/1; MG/1
1 TABLET ORAL EVERY 4 HOURS PRN
Status: ON HOLD | COMMUNITY
End: 2018-05-08 | Stop reason: HOSPADM

## 2018-02-26 RX ORDER — ASPIRIN 81 MG/1
81 TABLET, CHEWABLE ORAL DAILY
Status: DISCONTINUED | OUTPATIENT
Start: 2018-02-26 | End: 2018-03-02 | Stop reason: HOSPADM

## 2018-02-26 RX ORDER — ACETAMINOPHEN 325 MG/1
650 TABLET ORAL EVERY 6 HOURS PRN
Status: DISCONTINUED | OUTPATIENT
Start: 2018-02-26 | End: 2018-02-26 | Stop reason: SDUPTHER

## 2018-02-26 RX ORDER — METHYLPREDNISOLONE SODIUM SUCCINATE 125 MG/2ML
125 INJECTION, POWDER, LYOPHILIZED, FOR SOLUTION INTRAMUSCULAR; INTRAVENOUS ONCE
Status: COMPLETED | OUTPATIENT
Start: 2018-02-26 | End: 2018-02-26

## 2018-02-26 RX ORDER — SODIUM CHLORIDE 0.9 % (FLUSH) 0.9 %
10 SYRINGE (ML) INJECTION EVERY 12 HOURS SCHEDULED
Status: DISCONTINUED | OUTPATIENT
Start: 2018-02-26 | End: 2018-03-02 | Stop reason: HOSPADM

## 2018-02-26 RX ORDER — AMLODIPINE BESYLATE 5 MG/1
5 TABLET ORAL DAILY
COMMUNITY
End: 2019-02-04 | Stop reason: DRUGHIGH

## 2018-02-26 RX ORDER — ACETAMINOPHEN 325 MG/1
650 TABLET ORAL EVERY 4 HOURS PRN
Status: DISCONTINUED | OUTPATIENT
Start: 2018-02-26 | End: 2018-03-02 | Stop reason: HOSPADM

## 2018-02-26 RX ORDER — TRAZODONE HYDROCHLORIDE 50 MG/1
50 TABLET ORAL NIGHTLY
Status: DISCONTINUED | OUTPATIENT
Start: 2018-02-26 | End: 2018-03-02 | Stop reason: HOSPADM

## 2018-02-26 RX ORDER — 0.9 % SODIUM CHLORIDE 0.9 %
500 INTRAVENOUS SOLUTION INTRAVENOUS ONCE
Status: COMPLETED | OUTPATIENT
Start: 2018-02-26 | End: 2018-02-26

## 2018-02-26 RX ORDER — LIDOCAINE 50 MG/G
1 PATCH TOPICAL DAILY
Status: DISCONTINUED | OUTPATIENT
Start: 2018-02-27 | End: 2018-03-02 | Stop reason: HOSPADM

## 2018-02-26 RX ORDER — ALPRAZOLAM 0.5 MG/1
0.5 TABLET ORAL 3 TIMES DAILY PRN
Status: DISCONTINUED | OUTPATIENT
Start: 2018-02-26 | End: 2018-03-02 | Stop reason: HOSPADM

## 2018-02-26 RX ORDER — LEVOTHYROXINE SODIUM 0.03 MG/1
25 TABLET ORAL DAILY
Status: DISCONTINUED | OUTPATIENT
Start: 2018-02-27 | End: 2018-03-02 | Stop reason: HOSPADM

## 2018-02-26 RX ORDER — SODIUM CHLORIDE 0.9 % (FLUSH) 0.9 %
10 SYRINGE (ML) INJECTION PRN
Status: DISCONTINUED | OUTPATIENT
Start: 2018-02-26 | End: 2018-03-02 | Stop reason: HOSPADM

## 2018-02-26 RX ORDER — ALPRAZOLAM 0.5 MG/1
0.5 TABLET ORAL 3 TIMES DAILY PRN
Status: ON HOLD | COMMUNITY
End: 2018-10-25

## 2018-02-26 RX ORDER — GABAPENTIN 300 MG/1
300 CAPSULE ORAL 2 TIMES DAILY
Status: DISCONTINUED | OUTPATIENT
Start: 2018-02-26 | End: 2018-03-02 | Stop reason: HOSPADM

## 2018-02-26 RX ORDER — ISOSORBIDE MONONITRATE 60 MG/1
60 TABLET, EXTENDED RELEASE ORAL DAILY
Status: DISCONTINUED | OUTPATIENT
Start: 2018-02-26 | End: 2018-03-02 | Stop reason: HOSPADM

## 2018-02-26 RX ORDER — ALBUTEROL SULFATE 2.5 MG/3ML
1.25 SOLUTION RESPIRATORY (INHALATION) EVERY 6 HOURS PRN
Status: DISCONTINUED | OUTPATIENT
Start: 2018-02-26 | End: 2018-03-02 | Stop reason: HOSPADM

## 2018-02-26 RX ORDER — ASPIRIN 81 MG/1
81 TABLET, CHEWABLE ORAL DAILY
COMMUNITY

## 2018-02-26 RX ORDER — CARVEDILOL 6.25 MG/1
6.25 TABLET ORAL 2 TIMES DAILY WITH MEALS
Status: DISCONTINUED | OUTPATIENT
Start: 2018-02-26 | End: 2018-03-02 | Stop reason: HOSPADM

## 2018-02-26 RX ORDER — AMOXICILLIN 250 MG
1 CAPSULE ORAL DAILY
Status: ON HOLD | COMMUNITY
End: 2018-05-08 | Stop reason: HOSPADM

## 2018-02-26 RX ORDER — POLYETHYLENE GLYCOL 3350 17 G/17G
17 POWDER, FOR SOLUTION ORAL DAILY PRN
COMMUNITY

## 2018-02-26 RX ORDER — BUDESONIDE 0.5 MG/2ML
500 INHALANT ORAL EVERY 12 HOURS PRN
Status: DISCONTINUED | OUTPATIENT
Start: 2018-02-26 | End: 2018-03-02 | Stop reason: HOSPADM

## 2018-02-26 RX ORDER — NITROGLYCERIN 0.4 MG/1
0.4 TABLET SUBLINGUAL EVERY 5 MIN PRN
Status: DISCONTINUED | OUTPATIENT
Start: 2018-02-26 | End: 2018-03-02 | Stop reason: HOSPADM

## 2018-02-26 RX ORDER — LOPERAMIDE HYDROCHLORIDE 2 MG/1
2 CAPSULE ORAL 4 TIMES DAILY PRN
Status: DISCONTINUED | OUTPATIENT
Start: 2018-02-26 | End: 2018-03-02 | Stop reason: HOSPADM

## 2018-02-26 RX ORDER — AMLODIPINE BESYLATE 5 MG/1
5 TABLET ORAL DAILY
Status: DISCONTINUED | OUTPATIENT
Start: 2018-02-26 | End: 2018-03-02 | Stop reason: HOSPADM

## 2018-02-26 RX ORDER — LEVOTHYROXINE SODIUM 0.03 MG/1
25 TABLET ORAL DAILY
COMMUNITY

## 2018-02-26 RX ORDER — BUDESONIDE 0.5 MG/2ML
1 INHALANT ORAL EVERY 12 HOURS PRN
COMMUNITY

## 2018-02-26 RX ORDER — DIVALPROEX SODIUM 125 MG/1
125 TABLET, DELAYED RELEASE ORAL 3 TIMES DAILY
Status: DISCONTINUED | OUTPATIENT
Start: 2018-02-26 | End: 2018-03-02 | Stop reason: HOSPADM

## 2018-02-26 RX ORDER — LEVALBUTEROL INHALATION SOLUTION 1.25 MG/3ML
1 SOLUTION RESPIRATORY (INHALATION) EVERY 6 HOURS PRN
COMMUNITY
End: 2018-05-05

## 2018-02-26 RX ORDER — SENNA AND DOCUSATE SODIUM 50; 8.6 MG/1; MG/1
1 TABLET, FILM COATED ORAL DAILY
Status: DISCONTINUED | OUTPATIENT
Start: 2018-02-26 | End: 2018-03-02 | Stop reason: HOSPADM

## 2018-02-26 RX ORDER — GUAIFENESIN 600 MG/1
1200 TABLET, EXTENDED RELEASE ORAL 2 TIMES DAILY
Status: DISCONTINUED | OUTPATIENT
Start: 2018-02-26 | End: 2018-03-02 | Stop reason: HOSPADM

## 2018-02-26 RX ORDER — LOPERAMIDE HYDROCHLORIDE 2 MG/1
2 CAPSULE ORAL 4 TIMES DAILY PRN
Status: ON HOLD | COMMUNITY
End: 2018-03-01 | Stop reason: HOSPADM

## 2018-02-26 RX ORDER — GUAIFENESIN 600 MG/1
1200 TABLET, EXTENDED RELEASE ORAL 2 TIMES DAILY
Status: ON HOLD | COMMUNITY
End: 2018-05-08 | Stop reason: HOSPADM

## 2018-02-26 RX ADMIN — ENOXAPARIN SODIUM 40 MG: 40 INJECTION SUBCUTANEOUS at 23:02

## 2018-02-26 RX ADMIN — TRAZODONE HYDROCHLORIDE 50 MG: 50 TABLET ORAL at 22:51

## 2018-02-26 RX ADMIN — GUAIFENESIN 1200 MG: 600 TABLET, EXTENDED RELEASE ORAL at 22:51

## 2018-02-26 RX ADMIN — CARBIDOPA AND LEVODOPA 1 TABLET: 25; 100 TABLET ORAL at 22:49

## 2018-02-26 RX ADMIN — METHYLPREDNISOLONE SODIUM SUCCINATE 125 MG: 125 INJECTION, POWDER, FOR SOLUTION INTRAMUSCULAR; INTRAVENOUS at 10:02

## 2018-02-26 RX ADMIN — MOMETASONE FUROATE AND FORMOTEROL FUMARATE DIHYDRATE 2 PUFF: 200; 5 AEROSOL RESPIRATORY (INHALATION) at 21:15

## 2018-02-26 RX ADMIN — GABAPENTIN 300 MG: 300 CAPSULE ORAL at 23:02

## 2018-02-26 RX ADMIN — Medication 10 ML: at 22:50

## 2018-02-26 RX ADMIN — AZITHROMYCIN MONOHYDRATE 500 MG: 500 INJECTION, POWDER, LYOPHILIZED, FOR SOLUTION INTRAVENOUS at 11:19

## 2018-02-26 RX ADMIN — CARVEDILOL 6.25 MG: 6.25 TABLET, FILM COATED ORAL at 23:07

## 2018-02-26 RX ADMIN — LEVALBUTEROL 1.25 MG: 1.25 SOLUTION, CONCENTRATE RESPIRATORY (INHALATION) at 10:27

## 2018-02-26 RX ADMIN — DIVALPROEX SODIUM 125 MG: 125 TABLET, DELAYED RELEASE ORAL at 22:50

## 2018-02-26 RX ADMIN — SODIUM CHLORIDE 500 ML: 9 INJECTION, SOLUTION INTRAVENOUS at 08:43

## 2018-02-26 ASSESSMENT — PAIN DESCRIPTION - LOCATION: LOCATION: HAND

## 2018-02-26 ASSESSMENT — ENCOUNTER SYMPTOMS
RHINORRHEA: 0
VOMITING: 0
COLOR CHANGE: 0
COUGH: 0
DIARRHEA: 0
SORE THROAT: 0
WHEEZING: 0
NAUSEA: 0
SINUS PRESSURE: 0
CONSTIPATION: 0
ABDOMINAL PAIN: 0
SHORTNESS OF BREATH: 0

## 2018-02-26 ASSESSMENT — PAIN DESCRIPTION - ORIENTATION: ORIENTATION: RIGHT

## 2018-02-26 ASSESSMENT — PAIN SCALES - GENERAL: PAINLEVEL_OUTOF10: 10

## 2018-02-26 NOTE — PROGRESS NOTES
Pharmacy Accuracy Service Medication History Note    The patient's list of current home medications has been reviewed and updated by the Medication Accuracy Service. Source(s) of information: med record from [facility] facility    Please feel free to call with any questions about this encounter. Thank you.     Jordy Santos PharmD  Pharmacy Medication Accuracy Review Service  Phone:  [phone]  Fax: [phone]

## 2018-02-26 NOTE — ED NOTES
Pt presents via squad from [facility] with c/o increased tremors and hand pain. Pt has a Hx of Parkinson's, but states she is shaking more than usual. Pt is experiencing pain in both hands, but more in the R hand. Denies injury. A+Ox4. RUE weakness noted. Lung sounds clear. Respirations even and nonlabored. SpO2 82% on RA, applied 2L NC. Rates pain 10/10.       Teresa Fink RN  02/26/18 [id number]

## 2018-02-26 NOTE — ED NOTES
Bed: 19  Expected date: 2/26/18  Expected time: 7:41 AM  Means of arrival:   Comments:  Jo.  Amber Evangelista RN  02/26/18 0553

## 2018-02-26 NOTE — ED PROVIDER NOTES
Patient seen with nurse practitioner Mikayla Gannon. She is given IV Solu-Medrol serial aerosols with Xopenex with improvement in symptomatology. She'll be started on Zithromax because increased sputum production and exacerbation of her COPD. She is chronically leukopenic. She is on home oxygen. 1synergysharedvisit    Attending Supervisory Note/Shared Visit   I have personally performed a face to face diagnostic evaluation on this patient. I have reviewed the mid-levels findings and agree.   History and Exam by me shows an alert and oriented patient seen with extender I agree with the assessment treatment plan and disposition      (Please note that portions of this note were completed with a voice recognition program.  Efforts were made to edit the dictations but occasionally words are mis-transcribed.)    Jake Lopez MD  Attending Emergency Physician       Jake Lopez MD  02/26/18 [id number]
Xr Chest Portable    Result Date: 2/26/2018  EXAMINATION: SINGLE VIEW OF THE CHEST 2/26/2018 8:28 am COMPARISON: AP chest from 07/13/2016 HISTORY: ORDERING SYSTEM PROVIDED HISTORY: Chest Pain TECHNOLOGIST PROVIDED HISTORY: Reason for exam:->Chest Pain Ordering Physician Provided Reason for Exam: Pt c/o chest pain x today. Hx of tremors, which have increased. Acuity: Acute Type of Exam: Initial History of hypertension, chronic obstructive pulmonary disease, CAD, diabetes, and lymphoma. FINDINGS: Right IJ chemo port with its tip in the mid right atrium. Postsurgical changes right hemithorax with rib changes, elevated right hemidiaphragm and hilar clips, unchanged. Slight basilar atelectasis or scarring. Slightly greater atelectatic appearing change left mid lung but no consolidation or sizable pleural effusion. Cardiomediastinal shadow on bony structures stable. Slightly greater atelectatic appearing change left mid lung ; no consolidation or sizable pleural effusion. Right-sided partial pneumonectomy with postsurgical changes. Right-sided chemo port in satisfactory position. Interpretation per the Radiologist below, if available at the time of this note:    XR HAND RIGHT (MIN 3 VIEWS)   Final Result   Osteoarthrosis most advanced in the 2nd metacarpophalangeal joint. XR CHEST PORTABLE   Final Result   Slightly greater atelectatic appearing change left mid lung ; no   consolidation or sizable pleural effusion. Right-sided partial pneumonectomy with postsurgical changes. Right-sided   chemo port in satisfactory position.                  LABS:  Labs Reviewed   CBC WITH AUTO DIFFERENTIAL - Abnormal; Notable for the following:        Result Value    WBC 2.7 (*)     RBC 3.83 (*)     Lymphocytes 23 (*)     Monocytes 9 (*)     Absolute Lymph # 0.60 (*)     All other components within normal limits   BASIC METABOLIC PANEL - Abnormal; Notable for the following:     Potassium 3.6 (*)     CO2 35 (*)

## 2018-02-26 NOTE — ED NOTES
Resting on cart. No s/sx distress. Resp even and nonlabored. No s/sx tremors at this time. Awakens easily. Denies needs.      Aspen Ramirez RN  02/26/18 [id number]

## 2018-02-27 ENCOUNTER — APPOINTMENT (OUTPATIENT)
Dept: CT IMAGING | Age: 78
DRG: 190 | End: 2018-02-27
Payer: MEDICARE

## 2018-02-27 LAB
ABSOLUTE EOS #: 0.03 K/UL (ref 0–0.4)
ABSOLUTE IMMATURE GRANULOCYTE: ABNORMAL K/UL (ref 0–0.3)
ABSOLUTE LYMPH #: 0.63 K/UL (ref 1–4.8)
ABSOLUTE MONO #: 0.4 K/UL (ref 0.2–0.8)
AMMONIA: 59 UMOL/L (ref 11–51)
ANION GAP SERPL CALCULATED.3IONS-SCNC: 10 MMOL/L (ref 9–17)
BASOPHILS # BLD: 0 %
BASOPHILS ABSOLUTE: 0 K/UL (ref 0–0.2)
BILIRUBIN URINE: NEGATIVE
BNP INTERPRETATION: ABNORMAL
BUN BLDV-MCNC: 12 MG/DL (ref 8–23)
BUN/CREAT BLD: 22 (ref 9–20)
C-REACTIVE PROTEIN: 9.8 MG/L (ref 0–5)
CALCIUM SERPL-MCNC: 8.5 MG/DL (ref 8.6–10.4)
CHLORIDE BLD-SCNC: 102 MMOL/L (ref 98–107)
CO2: 32 MMOL/L (ref 20–31)
COLOR: YELLOW
COMMENT UA: NORMAL
CREAT SERPL-MCNC: 0.55 MG/DL (ref 0.5–0.9)
DIFFERENTIAL TYPE: ABNORMAL
EOSINOPHILS RELATIVE PERCENT: 1 % (ref 1–4)
FOLATE: 6.7 NG/ML
GFR AFRICAN AMERICAN: >60 ML/MIN
GFR NON-AFRICAN AMERICAN: >60 ML/MIN
GFR SERPL CREATININE-BSD FRML MDRD: ABNORMAL ML/MIN/{1.73_M2}
GFR SERPL CREATININE-BSD FRML MDRD: ABNORMAL ML/MIN/{1.73_M2}
GLUCOSE BLD-MCNC: 97 MG/DL (ref 70–99)
GLUCOSE URINE: NEGATIVE
HCT VFR BLD CALC: 34.3 % (ref 36–46)
HEMOGLOBIN: 11.2 G/DL (ref 12–16)
IMMATURE GRANULOCYTES: ABNORMAL %
KETONES, URINE: NEGATIVE
LEUKOCYTE ESTERASE, URINE: NEGATIVE
LYMPHOCYTES # BLD: 19 % (ref 24–44)
MCH RBC QN AUTO: 32.1 PG (ref 26–34)
MCHC RBC AUTO-ENTMCNC: 32.7 G/DL (ref 31–37)
MCV RBC AUTO: 98.1 FL (ref 80–100)
MONOCYTES # BLD: 12 % (ref 1–7)
MORPHOLOGY: ABNORMAL
NITRITE, URINE: NEGATIVE
NRBC AUTOMATED: ABNORMAL PER 100 WBC
PDW BLD-RTO: 13.8 % (ref 11.5–14.5)
PH UA: 7.5 (ref 5–8)
PLATELET # BLD: 120 K/UL (ref 130–400)
PLATELET ESTIMATE: ABNORMAL
PMV BLD AUTO: ABNORMAL FL (ref 6–12)
POTASSIUM SERPL-SCNC: 4.1 MMOL/L (ref 3.7–5.3)
PRO-BNP: 2732 PG/ML
PROTEIN UA: NEGATIVE
RBC # BLD: 3.49 M/UL (ref 4–5.2)
RBC # BLD: ABNORMAL 10*6/UL
SEDIMENTATION RATE, ERYTHROCYTE: 45 MM (ref 0–20)
SEG NEUTROPHILS: 68 % (ref 36–66)
SEGMENTED NEUTROPHILS ABSOLUTE COUNT: 2.24 K/UL (ref 1.8–7.7)
SODIUM BLD-SCNC: 144 MMOL/L (ref 135–144)
SPECIFIC GRAVITY UA: 1.02 (ref 1–1.03)
T. PALLIDUM, IGG: NONREACTIVE
THYROXINE, FREE: 0.74 NG/DL (ref 0.93–1.7)
TSH SERPL DL<=0.05 MIU/L-ACNC: 0.37 MIU/L (ref 0.3–5)
TURBIDITY: CLEAR
URINE HGB: NEGATIVE
UROBILINOGEN, URINE: NORMAL
VITAMIN B-12: 861 PG/ML (ref 232–1245)
WBC # BLD: 3.3 K/UL (ref 3.5–11)
WBC # BLD: ABNORMAL 10*3/UL

## 2018-02-27 PROCEDURE — 82140 ASSAY OF AMMONIA: CPT

## 2018-02-27 PROCEDURE — 70450 CT HEAD/BRAIN W/O DYE: CPT

## 2018-02-27 PROCEDURE — 83880 ASSAY OF NATRIURETIC PEPTIDE: CPT

## 2018-02-27 PROCEDURE — 36415 COLL VENOUS BLD VENIPUNCTURE: CPT

## 2018-02-27 PROCEDURE — 86780 TREPONEMA PALLIDUM: CPT

## 2018-02-27 PROCEDURE — 6360000002 HC RX W HCPCS: Performed by: FAMILY MEDICINE

## 2018-02-27 PROCEDURE — 85025 COMPLETE CBC W/AUTO DIFF WBC: CPT

## 2018-02-27 PROCEDURE — 85651 RBC SED RATE NONAUTOMATED: CPT

## 2018-02-27 PROCEDURE — 94640 AIRWAY INHALATION TREATMENT: CPT

## 2018-02-27 PROCEDURE — 86140 C-REACTIVE PROTEIN: CPT

## 2018-02-27 PROCEDURE — 6370000000 HC RX 637 (ALT 250 FOR IP): Performed by: FAMILY MEDICINE

## 2018-02-27 PROCEDURE — 82607 VITAMIN B-12: CPT

## 2018-02-27 PROCEDURE — 81003 URINALYSIS AUTO W/O SCOPE: CPT

## 2018-02-27 PROCEDURE — 82746 ASSAY OF FOLIC ACID SERUM: CPT

## 2018-02-27 PROCEDURE — 84443 ASSAY THYROID STIM HORMONE: CPT

## 2018-02-27 PROCEDURE — 2580000003 HC RX 258: Performed by: FAMILY MEDICINE

## 2018-02-27 PROCEDURE — 2060000000 HC ICU INTERMEDIATE R&B

## 2018-02-27 PROCEDURE — 84439 ASSAY OF FREE THYROXINE: CPT

## 2018-02-27 PROCEDURE — 80048 BASIC METABOLIC PNL TOTAL CA: CPT

## 2018-02-27 RX ADMIN — DULOXETINE HYDROCHLORIDE 90 MG: 60 CAPSULE, DELAYED RELEASE ORAL at 08:36

## 2018-02-27 RX ADMIN — GUAIFENESIN 1200 MG: 600 TABLET, EXTENDED RELEASE ORAL at 08:36

## 2018-02-27 RX ADMIN — GABAPENTIN 300 MG: 300 CAPSULE ORAL at 21:50

## 2018-02-27 RX ADMIN — DIVALPROEX SODIUM 125 MG: 125 TABLET, DELAYED RELEASE ORAL at 21:50

## 2018-02-27 RX ADMIN — ENOXAPARIN SODIUM 40 MG: 40 INJECTION SUBCUTANEOUS at 08:35

## 2018-02-27 RX ADMIN — CARBIDOPA AND LEVODOPA 1 TABLET: 25; 100 TABLET ORAL at 17:10

## 2018-02-27 RX ADMIN — AZITHROMYCIN MONOHYDRATE 500 MG: 500 INJECTION, POWDER, LYOPHILIZED, FOR SOLUTION INTRAVENOUS at 12:05

## 2018-02-27 RX ADMIN — DIVALPROEX SODIUM 125 MG: 125 TABLET, DELAYED RELEASE ORAL at 08:37

## 2018-02-27 RX ADMIN — CARBIDOPA AND LEVODOPA 1 TABLET: 25; 100 TABLET ORAL at 12:04

## 2018-02-27 RX ADMIN — AMLODIPINE BESYLATE 5 MG: 5 TABLET ORAL at 08:38

## 2018-02-27 RX ADMIN — Medication 10 ML: at 08:38

## 2018-02-27 RX ADMIN — LEVOTHYROXINE SODIUM 25 MCG: 25 TABLET ORAL at 08:36

## 2018-02-27 RX ADMIN — CARVEDILOL 6.25 MG: 6.25 TABLET, FILM COATED ORAL at 17:10

## 2018-02-27 RX ADMIN — CYANOCOBALAMIN TAB 1000 MCG 500 MCG: 1000 TAB at 08:36

## 2018-02-27 RX ADMIN — CARBIDOPA AND LEVODOPA 1 TABLET: 25; 100 TABLET ORAL at 08:37

## 2018-02-27 RX ADMIN — Medication 10 ML: at 21:51

## 2018-02-27 RX ADMIN — DIVALPROEX SODIUM 125 MG: 125 TABLET, DELAYED RELEASE ORAL at 14:14

## 2018-02-27 RX ADMIN — MOMETASONE FUROATE AND FORMOTEROL FUMARATE DIHYDRATE 2 PUFF: 200; 5 AEROSOL RESPIRATORY (INHALATION) at 21:17

## 2018-02-27 RX ADMIN — GABAPENTIN 300 MG: 300 CAPSULE ORAL at 09:59

## 2018-02-27 RX ADMIN — GUAIFENESIN 1200 MG: 600 TABLET, EXTENDED RELEASE ORAL at 21:50

## 2018-02-27 RX ADMIN — MOMETASONE FUROATE AND FORMOTEROL FUMARATE DIHYDRATE 2 PUFF: 200; 5 AEROSOL RESPIRATORY (INHALATION) at 09:53

## 2018-02-27 RX ADMIN — TRAZODONE HYDROCHLORIDE 50 MG: 50 TABLET ORAL at 21:50

## 2018-02-27 RX ADMIN — DOCUSATE SODIUM AND SENNOSIDES 1 TABLET: 8.6; 5 TABLET, FILM COATED ORAL at 08:36

## 2018-02-27 RX ADMIN — ISOSORBIDE MONONITRATE 60 MG: 60 TABLET ORAL at 08:37

## 2018-02-27 RX ADMIN — CARVEDILOL 6.25 MG: 6.25 TABLET, FILM COATED ORAL at 08:37

## 2018-02-27 RX ADMIN — CARBIDOPA AND LEVODOPA 1 TABLET: 25; 100 TABLET ORAL at 21:50

## 2018-02-27 RX ADMIN — ASPIRIN 81 MG 81 MG: 81 TABLET ORAL at 08:38

## 2018-02-27 ASSESSMENT — PAIN SCALES - GENERAL
PAINLEVEL_OUTOF10: 0

## 2018-02-27 ASSESSMENT — PAIN SCALES - WONG BAKER
WONGBAKER_NUMERICALRESPONSE: 0
WONGBAKER_NUMERICALRESPONSE: 2
WONGBAKER_NUMERICALRESPONSE: 6

## 2018-02-27 ASSESSMENT — PAIN DESCRIPTION - LOCATION
LOCATION: HAND
LOCATION: HAND
LOCATION: HAND;NECK

## 2018-02-27 ASSESSMENT — PAIN DESCRIPTION - ORIENTATION
ORIENTATION: RIGHT
ORIENTATION: RIGHT

## 2018-02-27 NOTE — H&P
loperamide (IMODIUM) 2 MG capsule Take 2 mg by mouth 4 times daily as needed for Diarrhea   Yes Historical Provider, MD   aspirin 81 MG chewable tablet Take 81 mg by mouth daily   Yes Historical Provider, MD   divalproex (DEPAKOTE) 125 MG DR tablet Take 125 mg by mouth 3 times daily    Yes Historical Provider, MD   vitamin B-12 (CYANOCOBALAMIN) 500 MCG tablet Take 500 mcg by mouth daily   Yes Historical Provider, MD   carvedilol (COREG) 6.25 MG tablet Take 6.25 mg by mouth 2 times daily (with meals)   Yes Historical Provider, MD   gabapentin (NEURONTIN) 300 MG capsule Take 300 mg by mouth 2 times daily . Yes Historical Provider, MD   carbidopa-levodopa (SINEMET)  MG per tablet Take 1 tablet by mouth 4 times daily    Yes Historical Provider, MD   traZODone (DESYREL) 50 MG tablet Take 50 mg by mouth nightly   Yes Historical Provider, MD   isosorbide mononitrate (IMDUR) 60 MG CR tablet Take 1 tablet by mouth daily. 3/25/15  Yes Caitlin Reardon MD   tiotropium (SPIRIVA HANDIHALER) 18 MCG inhalation capsule Inhale 18 mcg into the lungs daily    Yes Historical Provider, MD   nitroGLYCERIN (NITROSTAT) 0.4 MG SL tablet Place 1 tablet under the tongue every 5 minutes as needed for Chest pain. 12/18/13   James Henderson MD        Allergies:       Naprosyn [naproxen]; Acetate; Actonel [risedronate sodium]; Albuterol sulfate; Atenolol; Cortisone; and Requip [ropinirole hcl]    Social History:     Tobacco:    reports that she has been smoking Cigarettes. She has been smoking about 0.50 packs per day. She has never used smokeless tobacco.  Alcohol:      reports that she does not drink alcohol. Drug Use:  reports that she does not use drugs.     Family History:     Family History   Problem Relation Age of Onset    Kidney Disease Mother     Cancer Mother     Heart Disease Father     Coronary Art Dis Sister        Review of Systems:     Positive and Negative as described in HPI    Constitutional:  negative for  fevers, chills, sweats, fatigue, and weight loss  HEENT:  negative for vision or hearing changes,   Respiratory:  negative for shortness of breath, cough, or congestion  Cardiovascular:  negative for  chest pain, palpitations  Gastrointestinal:  negative for nausea, vomiting, diarrhea, constipation, abdominal pain  Genitourinary:  negative for frequency, dysuria  Integument/Breast:  negative for rash, skin lesions  Musculoskeletal:  negative for muscle aches or joint pain  Neurological:  negative for headaches, dizziness, lightheadedness, numbness, pain and tingling extrimities  Behavior/Psych:  negative for depression and anxiety    Code Status:  Full Code    Physical Exam:     Vitals:  BP (!) 141/52   Pulse 69   Temp 98.3 °F (36.8 °C) (Oral)   Resp 18   Ht 4' 11\" (1.499 m)   Wt 127 lb 6.4 oz (57.8 kg)   SpO2 94%   BMI 25.73 kg/m²   Temp (24hrs), Av.3 °F (36.8 °C), Min:97.3 °F (36.3 °C), Max:98.8 °F (37.1 °C)      General appearance - alert, well appearing, and in no acute distress  Mental status - oriented to person, place, and time with normal affect  Head - normocephalic and atraumatic  Eyes - pupils equal and reactive, extraocular eye movements intact, conjunctiva clear  Ears - hearing appears to be intact  Nose - no drainage noted  Mouth - mucous membranes moist  Neck - supple, no carotid bruits, thyroid not palpable  Chest - clear to auscultation, normal effort  Heart - normal rate, regular rhythm, no murmurs  Abdomen - soft, nontender, nondistended, bowel sounds present all four quadrants, no masses, hepatomegaly or splenomegaly  Neurological - normal speech, no focal findings or movement disorder noted, cranial nerves II through XII grossly intact  Extremities - peripheral pulses palpable, no pedal edema or calf pain with palpation  Skin - no gross lesions, rashes, or induration noted    Osteopathic Examination: negative for  myalgias, arthralgias, pain, swelling, stiffness, decreased range of motion,

## 2018-02-27 NOTE — CONSULTS
times daily   vitamin B-12 (CYANOCOBALAMIN) 500 MCG tablet, Take 500 mcg by mouth daily  carvedilol (COREG) 6.25 MG tablet, Take 6.25 mg by mouth 2 times daily (with meals)  gabapentin (NEURONTIN) 300 MG capsule, Take 300 mg by mouth 2 times daily . carbidopa-levodopa (SINEMET)  MG per tablet, Take 1 tablet by mouth 4 times daily   traZODone (DESYREL) 50 MG tablet, Take 50 mg by mouth nightly  isosorbide mononitrate (IMDUR) 60 MG CR tablet, Take 1 tablet by mouth daily. tiotropium (SPIRIVA HANDIHALER) 18 MCG inhalation capsule, Inhale 18 mcg into the lungs daily   nitroGLYCERIN (NITROSTAT) 0.4 MG SL tablet, Place 1 tablet under the tongue every 5 minutes as needed for Chest pain. Social History:   Social History     Social History    Marital status: Single     Spouse name: N/A    Number of children: N/A    Years of education: N/A     Occupational History    Not on file. Social History Main Topics    Smoking status: Current Every Day Smoker     Packs/day: 0.50     Types: Cigarettes    Smokeless tobacco: Never Used    Alcohol use No    Drug use: No    Sexual activity: Not on file     Other Topics Concern    Not on file     Social History Narrative    No narrative on file       Family History:   Family History   Problem Relation Age of Onset    Kidney Disease Mother     Cancer Mother     Heart Disease Father     Coronary Art Dis Sister        Review of Systems: ROS  A 10 point ROS is negative from patient. Physical Exam:    Admission Weight: Weight: 127 lb 6.4 oz (57.8 kg)  Current Vital Signs: Blood pressure (!) 179/57, pulse 72, temperature 97.3 °F (36.3 °C), temperature source Oral, resp. rate 18, height 4' 11\" (1.499 m), weight 127 lb 6.4 oz (57.8 kg), SpO2 93 %. Vital signs in last 24 hours:  [unfilled]    Intake/Output last 3 shifts:  I/O last 3 completed shifts: In: 500 [IV Piggyback:500]  Out: -     Intake/Output this shift:  No intake/output data recorded.     Physical Exam:  BP (!) 179/57   Pulse 72   Temp 97.3 °F (36.3 °C) (Oral)   Resp 18   Ht 4' 11\" (1.499 m)   Wt 127 lb 6.4 oz (57.8 kg)   SpO2 93%   BMI 25.73 kg/m²     General Appearance:    Alert, cooperative, no distress, appears stated age   Head:    Normocephalic, without obvious abnormality, atraumatic   Eyes:    PERRL, conjunctiva/corneas clear, EOM's intact, fundi     benign, both eyes        Ears:    Normal TM's and external ear canals, both ears   Nose:   Nares normal, mucosa normal, no drainage   Throat:   Lips, mucosa, and tongue normal; teeth and gums normal   Neck:   Supple, symmetrical, trachea midline, no adenopathy;        thyroid:  No enlargement/tenderness/nodules; no carotid    bruit or JVD     Back:     Symmetric, no curvature, ROM normal, no CVA tenderness     Lungs:     Clear to auscultation bilaterally, respirations unlabored   Chest wall:    No tenderness or deformity   Heart:    Regular rate and rhythm, S1 and S2 normal, no murmur, rub   or gallop   Abdomen:     Soft, non-tender, bowel sounds active all four quadrants,     no masses, no organomegaly   Genitalia:    Deferred   Rectal:    Deferred   Extremities:   Extremities normal, atraumatic, no cyanosis or edema, arthritic changes. Pulses:   2+ and symmetric all extremities   Skin:   Skin color, texture, turgor normal, no rashes or lesions   Lymph nodes:   Cervical, supraclavicular, and axillary nodes normal     Neurologic:       CNII-XII intact. Normal strength, sensation and reflexes       Throughout, mentation may not be optimal, no major tremor is appreciated. Active Problems:    COPD (chronic obstructive pulmonary disease) (HCC)    COPD exacerbation (HCC)  Resolved Problems:    * No resolved hospital problems.  *       LOS: 1 day     Code Status:  Full Code    Current Meds:    sodium chloride flush  10 mL Intravenous 2 times per day    enoxaparin  40 mg Subcutaneous Daily    azithromycin  500 mg Intravenous Q24H    amLODIPine  5

## 2018-02-27 NOTE — CONSULTS
mg Oral TID    DULoxetine  90 mg Oral Daily    mometasone-formoterol  2 puff Inhalation BID    gabapentin  300 mg Oral BID    guaiFENesin  1,200 mg Oral BID    isosorbide mononitrate  60 mg Oral Daily    levothyroxine  25 mcg Oral Daily    lidocaine  1 patch Transdermal Daily    polyethylene glycol  17 g Oral Daily    sennosides-docusate sodium  1 tablet Oral Daily    tiotropium  18 mcg Inhalation Daily    traZODone  50 mg Oral Nightly    vitamin B-12  500 mcg Oral Daily     sodium chloride flush, acetaminophen, magnesium hydroxide, ondansetron, ALPRAZolam, budesonide, HYDROcodone-acetaminophen, hyoscyamine, ipratropium, albuterol, loperamide, nitroGLYCERIN  IV Drips/Infusions    Home Medications  Prescriptions Prior to Admission: amLODIPine (NORVASC) 5 MG tablet, Take 5 mg by mouth daily  levothyroxine (SYNTHROID) 25 MCG tablet, Take 25 mcg by mouth Daily  LIDOCAINE, LIDODERM, 5% PATCH AND REMOVAL, Apply 1 patch topically daily   polyethylene glycol (MIRALAX) powder, Take 17 g by mouth daily  senna-docusate (PERICOLACE) 8.6-50 MG per tablet, Take 1 tablet by mouth daily  HYDROcodone-acetaminophen (NORCO) 5-325 MG per tablet, Take 1 tablet by mouth every 4-6 hours as needed for Pain . fluticasone-salmeterol (ADVAIR) 250-50 MCG/DOSE AEPB, Inhale 1 puff into the lungs every 12 hours  DULoxetine (CYMBALTA) 30 MG extended release capsule, Take 90 mg by mouth daily Takes 3 caps (=90mg) daily  guaiFENesin (MUCINEX) 600 MG extended release tablet, Take 1,200 mg by mouth 2 times daily Takes 2 tabs (=1200mg) BID  acetaminophen (TYLENOL) 325 MG tablet, Take 650 mg by mouth every 6 hours as needed for Pain  ALPRAZolam (XANAX) 0.5 MG tablet, Take 0.5 mg by mouth 3 times daily as needed for Anxiety.   budesonide (PULMICORT) 0.5 MG/2ML nebulizer suspension, Take 1 ampule by nebulization every 12 hours as needed (COPD)  hyoscyamine (LEVSIN/SL) 125 MCG sublingual tablet, Place 125 mcg under the tongue 2 times daily as

## 2018-02-27 NOTE — PROGRESS NOTES
Updated Dr. Franklyn Watkins regarding patient c/o of loss of feeling in her feet, neuro consult ordered.

## 2018-02-28 ENCOUNTER — APPOINTMENT (OUTPATIENT)
Dept: GENERAL RADIOLOGY | Age: 78
DRG: 190 | End: 2018-02-28
Payer: MEDICARE

## 2018-02-28 LAB
ABSOLUTE EOS #: 0 K/UL (ref 0–0.4)
ABSOLUTE IMMATURE GRANULOCYTE: ABNORMAL K/UL (ref 0–0.3)
ABSOLUTE LYMPH #: 0.8 K/UL (ref 1–4.8)
ABSOLUTE MONO #: 0.2 K/UL (ref 0.2–0.8)
ANION GAP SERPL CALCULATED.3IONS-SCNC: 9 MMOL/L (ref 9–17)
BASOPHILS # BLD: 0 % (ref 0–2)
BASOPHILS ABSOLUTE: 0 K/UL (ref 0–0.2)
BUN BLDV-MCNC: 15 MG/DL (ref 8–23)
BUN/CREAT BLD: 23 (ref 9–20)
CALCIUM SERPL-MCNC: 8.3 MG/DL (ref 8.6–10.4)
CHLORIDE BLD-SCNC: 100 MMOL/L (ref 98–107)
CO2: 35 MMOL/L (ref 20–31)
CREAT SERPL-MCNC: 0.65 MG/DL (ref 0.5–0.9)
CULTURE: NORMAL
CULTURE: NORMAL
DIFFERENTIAL TYPE: ABNORMAL
EOSINOPHILS RELATIVE PERCENT: 2 % (ref 1–4)
GFR AFRICAN AMERICAN: >60 ML/MIN
GFR NON-AFRICAN AMERICAN: >60 ML/MIN
GFR SERPL CREATININE-BSD FRML MDRD: ABNORMAL ML/MIN/{1.73_M2}
GFR SERPL CREATININE-BSD FRML MDRD: ABNORMAL ML/MIN/{1.73_M2}
GLUCOSE BLD-MCNC: 89 MG/DL (ref 70–99)
HCT VFR BLD CALC: 30.9 % (ref 36–46)
HEMOGLOBIN: 10.4 G/DL (ref 12–16)
IMMATURE GRANULOCYTES: ABNORMAL %
LYMPHOCYTES # BLD: 29 % (ref 24–44)
Lab: NORMAL
MCH RBC QN AUTO: 32.5 PG (ref 26–34)
MCHC RBC AUTO-ENTMCNC: 33.5 G/DL (ref 31–37)
MCV RBC AUTO: 97.1 FL (ref 80–100)
MONOCYTES # BLD: 9 % (ref 1–7)
NRBC AUTOMATED: ABNORMAL PER 100 WBC
PDW BLD-RTO: 14.3 % (ref 11.5–14.5)
PLATELET # BLD: 131 K/UL (ref 130–400)
PLATELET ESTIMATE: ABNORMAL
PMV BLD AUTO: 7 FL (ref 6–12)
POTASSIUM SERPL-SCNC: 3.5 MMOL/L (ref 3.7–5.3)
RBC # BLD: 3.18 M/UL (ref 4–5.2)
RBC # BLD: ABNORMAL 10*6/UL
SEG NEUTROPHILS: 60 % (ref 36–66)
SEGMENTED NEUTROPHILS ABSOLUTE COUNT: 1.6 K/UL (ref 1.8–7.7)
SODIUM BLD-SCNC: 144 MMOL/L (ref 135–144)
SPECIMEN DESCRIPTION: NORMAL
SPECIMEN DESCRIPTION: NORMAL
STATUS: NORMAL
WBC # BLD: 2.7 K/UL (ref 3.5–11)
WBC # BLD: ABNORMAL 10*3/UL

## 2018-02-28 PROCEDURE — G8979 MOBILITY GOAL STATUS: HCPCS

## 2018-02-28 PROCEDURE — 94760 N-INVAS EAR/PLS OXIMETRY 1: CPT

## 2018-02-28 PROCEDURE — 94640 AIRWAY INHALATION TREATMENT: CPT

## 2018-02-28 PROCEDURE — G8987 SELF CARE CURRENT STATUS: HCPCS

## 2018-02-28 PROCEDURE — 2580000003 HC RX 258: Performed by: FAMILY MEDICINE

## 2018-02-28 PROCEDURE — 6370000000 HC RX 637 (ALT 250 FOR IP): Performed by: FAMILY MEDICINE

## 2018-02-28 PROCEDURE — G8978 MOBILITY CURRENT STATUS: HCPCS

## 2018-02-28 PROCEDURE — 85025 COMPLETE CBC W/AUTO DIFF WBC: CPT

## 2018-02-28 PROCEDURE — G8988 SELF CARE GOAL STATUS: HCPCS

## 2018-02-28 PROCEDURE — 36415 COLL VENOUS BLD VENIPUNCTURE: CPT

## 2018-02-28 PROCEDURE — 97166 OT EVAL MOD COMPLEX 45 MIN: CPT

## 2018-02-28 PROCEDURE — 97530 THERAPEUTIC ACTIVITIES: CPT

## 2018-02-28 PROCEDURE — 97116 GAIT TRAINING THERAPY: CPT

## 2018-02-28 PROCEDURE — 6360000002 HC RX W HCPCS: Performed by: FAMILY MEDICINE

## 2018-02-28 PROCEDURE — 2700000000 HC OXYGEN THERAPY PER DAY

## 2018-02-28 PROCEDURE — 2060000000 HC ICU INTERMEDIATE R&B

## 2018-02-28 PROCEDURE — 80048 BASIC METABOLIC PNL TOTAL CA: CPT

## 2018-02-28 PROCEDURE — 97162 PT EVAL MOD COMPLEX 30 MIN: CPT

## 2018-02-28 PROCEDURE — 97535 SELF CARE MNGMENT TRAINING: CPT

## 2018-02-28 PROCEDURE — 71045 X-RAY EXAM CHEST 1 VIEW: CPT

## 2018-02-28 RX ORDER — POTASSIUM CHLORIDE 20 MEQ/1
40 TABLET, EXTENDED RELEASE ORAL ONCE
Status: COMPLETED | OUTPATIENT
Start: 2018-02-28 | End: 2018-02-28

## 2018-02-28 RX ORDER — PANTOPRAZOLE SODIUM 40 MG/1
40 TABLET, DELAYED RELEASE ORAL
Status: DISCONTINUED | OUTPATIENT
Start: 2018-03-01 | End: 2018-03-02 | Stop reason: HOSPADM

## 2018-02-28 RX ORDER — DIPHENOXYLATE HYDROCHLORIDE AND ATROPINE SULFATE 2.5; .025 MG/1; MG/1
2 TABLET ORAL 4 TIMES DAILY PRN
Status: DISCONTINUED | OUTPATIENT
Start: 2018-02-28 | End: 2018-03-02 | Stop reason: HOSPADM

## 2018-02-28 RX ADMIN — GUAIFENESIN 1200 MG: 600 TABLET, EXTENDED RELEASE ORAL at 20:36

## 2018-02-28 RX ADMIN — GUAIFENESIN 1200 MG: 600 TABLET, EXTENDED RELEASE ORAL at 09:05

## 2018-02-28 RX ADMIN — MOMETASONE FUROATE AND FORMOTEROL FUMARATE DIHYDRATE 2 PUFF: 200; 5 AEROSOL RESPIRATORY (INHALATION) at 09:50

## 2018-02-28 RX ADMIN — DIVALPROEX SODIUM 125 MG: 125 TABLET, DELAYED RELEASE ORAL at 20:36

## 2018-02-28 RX ADMIN — DIVALPROEX SODIUM 125 MG: 125 TABLET, DELAYED RELEASE ORAL at 09:04

## 2018-02-28 RX ADMIN — CARVEDILOL 6.25 MG: 6.25 TABLET, FILM COATED ORAL at 16:10

## 2018-02-28 RX ADMIN — ISOSORBIDE MONONITRATE 60 MG: 60 TABLET ORAL at 09:04

## 2018-02-28 RX ADMIN — DULOXETINE HYDROCHLORIDE 90 MG: 60 CAPSULE, DELAYED RELEASE ORAL at 09:04

## 2018-02-28 RX ADMIN — AZITHROMYCIN MONOHYDRATE 500 MG: 500 INJECTION, POWDER, LYOPHILIZED, FOR SOLUTION INTRAVENOUS at 12:52

## 2018-02-28 RX ADMIN — DIVALPROEX SODIUM 125 MG: 125 TABLET, DELAYED RELEASE ORAL at 14:52

## 2018-02-28 RX ADMIN — CARVEDILOL 6.25 MG: 6.25 TABLET, FILM COATED ORAL at 09:05

## 2018-02-28 RX ADMIN — ENOXAPARIN SODIUM 40 MG: 40 INJECTION SUBCUTANEOUS at 09:05

## 2018-02-28 RX ADMIN — TRAZODONE HYDROCHLORIDE 50 MG: 50 TABLET ORAL at 20:36

## 2018-02-28 RX ADMIN — CARBIDOPA AND LEVODOPA 1 TABLET: 25; 100 TABLET ORAL at 09:05

## 2018-02-28 RX ADMIN — CYANOCOBALAMIN TAB 1000 MCG 500 MCG: 1000 TAB at 09:04

## 2018-02-28 RX ADMIN — POTASSIUM CHLORIDE 40 MEQ: 20 TABLET, EXTENDED RELEASE ORAL at 09:15

## 2018-02-28 RX ADMIN — GABAPENTIN 300 MG: 300 CAPSULE ORAL at 20:36

## 2018-02-28 RX ADMIN — LOPERAMIDE HYDROCHLORIDE 2 MG: 2 CAPSULE ORAL at 20:36

## 2018-02-28 RX ADMIN — GABAPENTIN 300 MG: 300 CAPSULE ORAL at 09:05

## 2018-02-28 RX ADMIN — LEVOTHYROXINE SODIUM 25 MCG: 25 TABLET ORAL at 06:30

## 2018-02-28 RX ADMIN — TIOTROPIUM BROMIDE 18 MCG: 18 CAPSULE ORAL; RESPIRATORY (INHALATION) at 09:50

## 2018-02-28 RX ADMIN — AMLODIPINE BESYLATE 5 MG: 5 TABLET ORAL at 09:05

## 2018-02-28 RX ADMIN — MOMETASONE FUROATE AND FORMOTEROL FUMARATE DIHYDRATE 2 PUFF: 200; 5 AEROSOL RESPIRATORY (INHALATION) at 21:01

## 2018-02-28 RX ADMIN — ASPIRIN 81 MG 81 MG: 81 TABLET ORAL at 09:05

## 2018-02-28 RX ADMIN — CARBIDOPA AND LEVODOPA 1 TABLET: 25; 100 TABLET ORAL at 20:36

## 2018-02-28 RX ADMIN — DOCUSATE SODIUM AND SENNOSIDES 1 TABLET: 8.6; 5 TABLET, FILM COATED ORAL at 09:05

## 2018-02-28 RX ADMIN — CARBIDOPA AND LEVODOPA 1 TABLET: 25; 100 TABLET ORAL at 16:10

## 2018-02-28 RX ADMIN — CARBIDOPA AND LEVODOPA 1 TABLET: 25; 100 TABLET ORAL at 12:50

## 2018-02-28 ASSESSMENT — PAIN SCALES - GENERAL
PAINLEVEL_OUTOF10: 0
PAINLEVEL_OUTOF10: 9

## 2018-02-28 ASSESSMENT — PAIN SCALES - WONG BAKER: WONGBAKER_NUMERICALRESPONSE: 0

## 2018-02-28 ASSESSMENT — PAIN DESCRIPTION - ONSET: ONSET: UNABLE TO TELL

## 2018-02-28 ASSESSMENT — PAIN DESCRIPTION - LOCATION: LOCATION: HAND

## 2018-02-28 ASSESSMENT — PAIN DESCRIPTION - FREQUENCY: FREQUENCY: OTHER (COMMENT)

## 2018-02-28 NOTE — PROGRESS NOTES
Cognitive Status: Exceptions  Safety Judgement: Decreased awareness of need for safety  Problem Solving: Assistance required to generate solutions;Assistance required to implement solutions;Assistance required to correct errors made  Perception  Overall Perceptual Status: [facility]     Sensation  Overall Sensation Status: WFL        LUE AROM (degrees)  LUE AROM : WFL  RUE AROM (degrees)  RUE AROM : WFL  LUE Strength  Gross LUE Strength: WFL (B UE's 3/5)  RUE Strength  Gross RUE Strength: WFL                  Assessment   Performance deficits / Impairments: Decreased functional mobility ; Decreased ADL status; Decreased strength;Decreased safe awareness;Decreased endurance;Decreased balance  Prognosis: Good  Decision Making: Medium Complexity  Patient Education: OT POC, discharge recommendations, safety with mob and transfers, pacing  Discharge Recommendations: Subacute/Skilled Nursing Facility  REQUIRES OT FOLLOW UP: Yes  Activity Tolerance  Activity Tolerance: Patient Tolerated treatment well;Patient limited by fatigue  Activity Tolerance: pt with frequent loose stools  Safety Devices  Safety Devices in place: Yes  Type of devices: Call light within reach;Nurse notified; Left in chair;Patient at risk for falls;Gait belt; Chair alarm in place        Discharge Recommendations:  [facility]  Times per week: 4-5x/week, 1-2x/day  Current Treatment Recommendations: Strengthening, Balance Training, Functional Mobility Training, Endurance Training, Safety Education & Training, Self-Care / ADL, Patient/Caregiver Education & Training, Positioning    G-Code  OT G-codes  Functional Assessment Tool Used: Chantel Amour \"6 clicks\"   Score: 13  Functional Limitation: Self care  Self Care Current Status (): At least 60 percent but less than 80 percent impaired, limited or restricted  Self Care Goal Status ():  At least 20 percent but less than 40 percent impaired, limited or restricted  OutComes Score AM-PAC Score        AM-PAC Inpatient Daily Activity Raw Score: 13  AM-PAC Inpatient ADL T-Scale Score : 32.03  ADL Inpatient CMS 0-100% Score: 63.03  ADL Inpatient CMS G-Code Modifier : CL    Goals  Short term goals  Time Frame for Short term goals: by discharge, pt will  Short term goal 1: demo CGA with ADL transfers with good safety   Short term goal 2: demo CGA with functional mob for ADL completion with good safety  Short term goal 3: demo min A with toileting routine with good safety and with approp DME  Short term goal 4: demo SBA with UB ADLs and min A LB ADLs with good pacing and safety  Short term goal 5: verb good understanding of fall prevention techs, EC/WS techs to utilize with ADLs and mob  Patient Goals   Patient goals : to go back to [address] [place] [address]   Time In 1025 (+[id number])         Time Out 1106         Minutes 41              Patient would benefit from SNF for continued occupational therapy to increase independence with  ADL of bathing, dressing, toileting and grooming. Writer recommending SNF placement for for activity tolerance and strength which will increase independence with ADL's coordinated with bed mobility and chair transfers. Continued skilled OT services to address decreased safety awareness with ADL and IADL tasks and for education and increased independence with DME and AE for fall prevention and ec/ws techniques prior to d/c home.     Shruthi Benjamin, OT

## 2018-02-28 NOTE — PROGRESS NOTES
General Precautions, Fall Risk  Position Activity Restriction  Other position/activity restrictions: 2 liters 02 at night  Vision/Hearing        Subjective  General  Chart Reviewed: Yes  Patient assessed for rehabilitation services?: Yes  Additional Pertinent Hx: Parkinsons, Breast Cancer, Lymphoma, TIA  Response To Previous Treatment: Not applicable  Family / Caregiver Present: No  Diagnosis: COPD exacerbation  Follows Commands: Within Functional Limits  General Comment  Comments: Shelby Ordoñez RN reports patient appropriate for PT. Subjective  Subjective: Patient pleasant and agreeable to PT, reports she needs to use commode.   Pain Screening  Patient Currently in Pain: Denies  Vital Signs  Patient Currently in Pain: Denies  Pre Treatment Pain Screening  Intervention List: Patient able to continue with treatment    Orientation  Orientation  Overall Orientation Status: Within Normal Limits    Social/Functional History  Social/Functional History  Type of Home: Assisted living ([address] [facility])  Bathroom Shower/Tub: Walk-in shower  Bathroom Toilet: Standard  Bathroom Equipment: Shower chair, Grab bars in shower, Grab bars around toilet  Home Equipment: Oxygen (2L O2 at night)  ADL Assistance: [address] [place] [address] Avenue: Needs assistance (goes to dining room for all 3 meals, assist for cleaning and laundry)  Ambulation Assistance: Needs assistance  Transfer Assistance: Independent (mostly transfers to w/c, minimal ambulation. )  Active : No  Objective    AROM RLE (degrees)  RLE AROM: WFL  AROM LLE (degrees)  LLE AROM : WFL  LLE General AROM: Right foot mild deformity from previous injury  AROM RUE (degrees)  RUE General AROM: See OT assessment  AROM LUE (degrees)  LUE General AROM: See OT assessment  Strength RLE  Comment: hip 4-/5, knee 4-/5, ankle 4-/5  Strength LLE  Comment: hip 4-/5, knee 3+/5, ankl 4-/5  Strength RUE  Comment: See OT assessment  Strength LUE  Comment: See OT assessment  Tone RLE  RLE precautions in place, Call light within reach, Chair alarm in place, Gait belt, Left in chair, Nurse notified  Restraints  Initially in place: No    G-Code  PT G-Codes  Functional Assessment Tool Used: [facility]  Score: 11/24  Functional Limitation: Mobility: Walking and moving around  Mobility: Walking and Moving Around Current Status (): At least 60 percent but less than 80 percent impaired, limited or restricted  Mobility: Walking and Moving Around Goal Status (): At least 1 percent but less than 20 percent impaired, limited or restricted    AM-PAC Score  AM-PAC Inpatient Mobility Raw Score : 11  AM-PAC Inpatient T-Scale Score : 33.86  Mobility Inpatient CMS 0-100% Score: 72.57  Mobility Inpatient CMS G-Code Modifier : CL          Goals  Short term goals  Time Frame for Short term goals: 12 visits  Short term goal 1: Patient will be indep with bed mobility and transfers. Short term goal 2: Patient will amb 100 feet with RW and supervision. Short term goal 3: Patient will have fair+ dynamic standing balance; Short term goal 4: Patient will tolerate 30 minutes of ther-ex and ther-act. Short term goal 5: Patient will be indep with HEP.   Patient Goals   Patient goals : Improve mobility       Therapy Time   Individual Concurrent Group Co-treatment   Time In 1026         Time Out 1106         Minutes 40             Additional time from 0820 to 100 [place] , PT

## 2018-02-28 NOTE — PROGRESS NOTES
Daily Neuro Progress Note       2018   9:18 AM  Patient info:  Eulogio Orozco is a 78 y.o. female  Account No.:  [de-identified]  Acct:     [de-identified]   MRN:   [id number]  :   1940    Room:  1024/1024-01   Day: 2    Date of Admission:  2018  7:54 AM  PCP: No primary care provider on file. History of Present Illness: Eulogio Orozco is a 78 y.o. White female who presented with history of several medical problems who comes into hospital with complaints of increased hand tremors and Right hand pain. She obviously has some issues with arthritis as joints are swollen. She has history of Parkinson's (managed by ?) and her dose of sinemet was increased recently. She also states that she cannot feel her legs well when standing. PMH:   Past Medical History:   Diagnosis Date    Airway obstruction     Angina effort ([address]     Anxiety     CAD (coronary artery disease)     s/p stents    Cancer ([address]     breast, right    Convulsions ([address]     COPD (chronic obstructive pulmonary disease) (HCC)     Degenerative disc disease     Cervical    Depression     Depression     Diabetes mellitus ([address]     Esophageal reflux     Hypertension     unspecified    Inflammatory spondylopathy ([address]     Iron deficiency     Lymphoma ([address]     MDRO (multiple drug resistant organisms) resistance 8/10/2014    E.  Coli urine    MRSA (methicillin resistant staph aureus) culture positive 2016    nares    Neuropathy ([address]     Osteoarthritis     Parkinson's disease ([address]     Peripheral vascular disease ([address]     Restless leg syndrome     Sleep apnea     Transient cerebral ischemia     Unsteady gait        PSH:   Past Surgical History:   Procedure Laterality Date    BONE MARROW BIOPSY  2017    BREAST LUMPECTOMY      right     CHOLECYSTECTOMY      CORONARY ANGIOPLASTY WITH STENT PLACEMENT      FEMORAL-FEMORAL BYPASS GRAFT      HYSTERECTOMY      LUNG REMOVAL, PARTIAL      Lung intake/output data recorded. Physical Exam:  BP (!) 167/45   Pulse 63   Temp 98.3 °F (36.8 °C) (Oral)   Resp 12   Ht 4' 11\" (1.499 m)   Wt 127 lb 6.4 oz (57.8 kg)   SpO2 92%   BMI 25.73 kg/m²     General Appearance:    Alert, cooperative, no distress, appears stated age   Head:    Normocephalic, without obvious abnormality, atraumatic   Eyes:    PERRL, conjunctiva/corneas clear, EOM's intact, fundi     benign, both eyes        Ears:    Normal TM's and external ear canals, both ears   Nose:   Nares normal, mucosa normal, no drainage   Throat:   Lips, mucosa, and tongue normal; teeth and gums normal   Neck:   Supple, symmetrical, trachea midline, no adenopathy;        thyroid:  No enlargement/tenderness/nodules; no carotid    bruit or JVD     Back:     Symmetric, no curvature, ROM normal, no CVA tenderness     Lungs:     Clear to auscultation bilaterally, respirations unlabored   Chest wall:    No tenderness or deformity   Heart:    Regular rate and rhythm, S1 and S2 normal, no murmur, rub   or gallop   Abdomen:     Soft, non-tender, bowel sounds active all four quadrants,     no masses, no organomegaly   Genitalia:    Deferred   Rectal:    Deferred   Extremities:   Extremities normal, atraumatic, no cyanosis or edema   Pulses:   2+ and symmetric all extremities   Skin:   Skin color, texture, turgor normal, no rashes or lesions   Lymph nodes:   Cervical, supraclavicular, and axillary nodes normal     Neurologic:       CNII-XII intact. Normal strength, sensation and reflexes       Throughout, still mildly confused, no major tremor     Active Problems:    COPD (chronic obstructive pulmonary disease) (HCC)    COPD exacerbation (HCC)  Resolved Problems:    * No resolved hospital problems.  *       LOS: 2 days     Code Status:  Full Code    Current Meds:    sodium chloride flush  10 mL Intravenous 2 times per day    enoxaparin  40 mg Subcutaneous Daily    azithromycin  500 mg Intravenous Q24H    amLODIPine  5 mg Oral Daily    aspirin  81 mg Oral Daily    carbidopa-levodopa  1 tablet Oral 4x Daily    carvedilol  6.25 mg Oral BID WC    divalproex  125 mg Oral TID    DULoxetine  90 mg Oral Daily    mometasone-formoterol  2 puff Inhalation BID    gabapentin  300 mg Oral BID    guaiFENesin  1,200 mg Oral BID    isosorbide mononitrate  60 mg Oral Daily    levothyroxine  25 mcg Oral Daily    lidocaine  1 patch Transdermal Daily    polyethylene glycol  17 g Oral Daily    sennosides-docusate sodium  1 tablet Oral Daily    tiotropium  18 mcg Inhalation Daily    traZODone  50 mg Oral Nightly    vitamin B-12  500 mcg Oral Daily     sodium chloride flush, acetaminophen, magnesium hydroxide, ondansetron, ALPRAZolam, budesonide, HYDROcodone-acetaminophen, hyoscyamine, ipratropium, albuterol, loperamide, nitroGLYCERIN    Lab Results   Component Value Date     02/28/2018    K 3.5 (L) 02/28/2018     02/28/2018    CO2 35 (H) 02/28/2018    BUN 15 02/28/2018    CREATININE 0.65 02/28/2018    GLUCOSE 89 02/28/2018    CALCIUM 8.3 (L) 02/28/2018    PROT 6.5 11/24/2017    LABALBU 3.6 11/24/2017    BILITOT 0.26 (L) 11/24/2017    ALKPHOS 53 11/24/2017    AST 14 11/24/2017    ALT <5 (L) 11/24/2017    LABGLOM >60 02/28/2018    GFRAA >60 02/28/2018    GLOB NOT REPORTED 05/18/2017       Xr Hand Right (min 3 Views)    Result Date: 2/26/2018  EXAMINATION: 3 VIEWS OF THE RIGHT HAND 2/26/2018 8:28 am COMPARISON: None. HISTORY: ORDERING SYSTEM PROVIDED HISTORY: Pain TECHNOLOGIST PROVIDED HISTORY: Reason for exam:->Pain Ordering Physician Provided Reason for Exam: Pt states pain x today to right hand. No known injury. Hx of tremors which have increased over the past few days. Acuity: Acute Type of Exam: Initial FINDINGS: Diffuse osteopenia is present. No convincing fracture. No dislocation.  There is advanced joint space narrowing involving the 2nd metacarpophalangeal joint and mild-moderate diffuse joint space narrowing are calcifications of the aortic arch. Similar right-sided rib deformities identified. Visualized osseous structures appear mildly demineralized, but grossly intact, given the non dedicated imaging. Improving aeration in the left mid lung with similar streaky densities at the left lung base likely reflect atelectasis. Developing pneumonia is difficult to exclude, but considered less likely. Attention on follow-up imaging is recommended. Xr Chest Portable    Result Date: 2/26/2018  EXAMINATION: SINGLE VIEW OF THE CHEST 2/26/2018 8:28 am COMPARISON: AP chest from 07/13/2016 HISTORY: ORDERING SYSTEM PROVIDED HISTORY: Chest Pain TECHNOLOGIST PROVIDED HISTORY: Reason for exam:->Chest Pain Ordering Physician Provided Reason for Exam: Pt c/o chest pain x today. Hx of tremors, which have increased. Acuity: Acute Type of Exam: Initial History of hypertension, chronic obstructive pulmonary disease, CAD, diabetes, and lymphoma. FINDINGS: Right IJ chemo port with its tip in the mid right atrium. Postsurgical changes right hemithorax with rib changes, elevated right hemidiaphragm and hilar clips, unchanged. Slight basilar atelectasis or scarring. Slightly greater atelectatic appearing change left mid lung but no consolidation or sizable pleural effusion. Cardiomediastinal shadow on bony structures stable. Slightly greater atelectatic appearing change left mid lung ; no consolidation or sizable pleural effusion. Right-sided partial pneumonectomy with postsurgical changes. Right-sided chemo port in satisfactory position. ASSESSMENT and PLAN    This is a 78year-old rather complicated woman who comes into hospital with complaints of Right hand pain and some increase in terms of tremor. She carries multiple diagnoses including Parkinsons.   She notes that her dose of sinemet was recently increased but the tremor kept her awake all night before coming into the hospital.     Perhaps a bit better in terms of

## 2018-02-28 NOTE — PROGRESS NOTES
Progress Note    2/28/2018   10:10 AM    Name:  Joel Paulino  MRN:    [id number]     [place]:     [de-identified]   Room:  [address] [place] [address] Day: 2     Admit Date: 2/26/2018  7:54 AM  PCP: No primary care provider on file. Subjective:     C/C:   Chief Complaint   Patient presents with    Tremors     Hx Parkinsons    Hand Pain       Interval History: Status: not changed. ROS:  Constitutional: negative for chills, fevers, sweats  Respiratory: negative for cough, dyspnea on exertion, hemoptysis, shortness of breath, wheezing  Cardiovascular: negative for chest pain, chest pressure/discomfort, dyspnea, lower extremity edema, palpitations  Gastrointestinal: negative for abdominal pain, constipation, diarrhea, nausea, vomiting  Neurological: negative for dizziness and headache    Medications: Allergies:    Allergies   Allergen Reactions    Naprosyn [Naproxen] Hives    Acetate     Actonel [Risedronate Sodium]     Albuterol Sulfate     Atenolol     Cortisone     Requip [Ropinirole Hcl]        Current Meds:   sodium chloride flush 0.9 % injection 10 mL 2 times per day   sodium chloride flush 0.9 % injection 10 mL PRN   acetaminophen (TYLENOL) tablet 650 mg Q4H PRN   magnesium hydroxide (MILK OF MAGNESIA) 400 MG/5ML suspension 30 mL Daily PRN   ondansetron (ZOFRAN) injection 4 mg Q6H PRN   enoxaparin (LOVENOX) injection 40 mg Daily   azithromycin (ZITHROMAX) 500 mg in D5W 250ml addavial Q24H   ALPRAZolam (XANAX) tablet 0.5 mg TID PRN   amLODIPine (NORVASC) tablet 5 mg Daily   aspirin chewable tablet 81 mg Daily   budesonide (PULMICORT) nebulizer suspension 500 mcg Q12H PRN   carbidopa-levodopa (SINEMET)  MG per tablet 1 tablet 4x Daily   carvedilol (COREG) tablet 6.25 mg BID WC   divalproex (DEPAKOTE) DR tablet 125 mg TID   DULoxetine (CYMBALTA) extended release capsule 90 mg Daily   mometasone-formoterol (DULERA) 200-5 MCG/ACT inhaler 2 puff BID   gabapentin (NEURONTIN) capsule 300 mg BID

## 2018-02-28 NOTE — PLAN OF CARE
Problem: Falls - Risk of  Goal: Absence of falls  Outcome: Ongoing  Patient is fall risk per fall scale. Falling star on door. Fall sticker on armband. Hourly rounding performed. Personal belongings and call light within reach. Bed in low position. Restraint alternatives in place : Side rails X 2, bed alarm on. Problem: Gas Exchange - Impaired:  Goal: Levels of oxygenation will improve  Levels of oxygenation will improve  Outcome: Ongoing  Oxygen administered as needed. Pulse oximetry levels WNL. No cyanosis noted. Lung sounds normal.  Repositioned to encourage proper ventilation.

## 2018-02-28 NOTE — PLAN OF CARE
Problem: Falls - Risk of  Intervention: Fall risk assessment  See helga fall risk assessment. Intervention: Postfall assessment  No falls this shift. Intervention: Fall precautions  Non-skid foot wear in place. Bed alarm on for patient safety. Bed rails up 3 out of 4. Hourly rounding. Intervention: Toileting assistance  Rest room assistance offered with hourly rounding. Patient wears brief, checked with hourly rounding and as needed. Goal: Absence of falls  Outcome: Ongoing  Patient is fall risk per fall scale. Falling star on door. Fall sticker on armband. Hourly rounding performed. Personal belongings and call light within reach. Bed in low position. Will continue to monitor.

## 2018-02-28 NOTE — PROGRESS NOTES
Pulmonary Critical Care Progress Note  Yousif Phillips MD     Patient seen for the follow up of chronic respiratory failure, acute exacerbation of COPD, tracheobronchitis, active smoker, anxiety disorder, tremors    Subjective:  She denies chest pain. Mild occasional cough, mostly dry. Shortness of breath better. Examination:  Vitals: BP (!) 110/53   Pulse 64   Temp 97.2 °F (36.2 °C) (Axillary)   Resp 20   Ht 4' 11\" (1.499 m)   Wt 127 lb 6.4 oz (57.8 kg)   SpO2 99%   BMI 25.73 kg/m²   General appearance: alert and cooperative with exam  Neck: No JVD  Lungs: clear to auscultation bilaterally and diminished breath sounds bilaterally  Heart: regular rate and rhythm, S1, S2 normal, no gallop  Abdomen: Soft, non tender, + BS  Extremities: no cyanosis or clubbing.  No significant edema    LABs:  CBC:   Recent Labs      02/27/18   0656  02/28/18   0632   WBC  3.3*  2.7*   HGB  11.2*  10.4*   HCT  34.3*  30.9*   PLT  120*  131     BMP:   Recent Labs      02/27/18   0656  02/28/18   0632   NA  144  144   K  4.1  3.5*   CO2  32*  35*   BUN  12  15   CREATININE  0.55  0.65   LABGLOM  >60  >60   GLUCOSE  97  89       Lab Results   Component Value Date    POCPH 7.36 02/26/2018    PHART 7.41 03/01/2013    POCPCO2 67 02/26/2018    LHN4LID 48 03/01/2013    POCPO2 65 02/26/2018    PO2ART 72 03/01/2013    POCHCO3 37.1 02/26/2018    DSZ3ZLQ 29.8 03/01/2013    NBEA NOT REPORTED 02/26/2018    PBEA 12 02/26/2018    FCP3OTB 39 02/26/2018    GEFF1OER 91 02/26/2018    L1GBNGGR 93.7 03/01/2013    FIO2 28.0 02/26/2018     Radiology:  2/28/18      Impression:  · Chronic respiratory failure  · Acute exacerbation of COPD, mild  · Tracheo-bronchitis  · Active smoker  · DM/HTN/CAD  · Anxiety disorder/tremors    Recommendations:  · Change IV Zithromax to oral for 3 more days  · Albuterol Q 4 hours and prn  · Dulera 200  · Spiriva  · Incentive spirometry every hour while awake   · 2 liters/min via nasal cannula  · DVT prophylaxis with low

## 2018-03-01 LAB
ANION GAP SERPL CALCULATED.3IONS-SCNC: 8 MMOL/L (ref 9–17)
BUN BLDV-MCNC: 13 MG/DL (ref 8–23)
BUN/CREAT BLD: 18 (ref 9–20)
CALCIUM SERPL-MCNC: 8.4 MG/DL (ref 8.6–10.4)
CHLORIDE BLD-SCNC: 102 MMOL/L (ref 98–107)
CO2: 33 MMOL/L (ref 20–31)
CREAT SERPL-MCNC: 0.71 MG/DL (ref 0.5–0.9)
GFR AFRICAN AMERICAN: >60 ML/MIN
GFR NON-AFRICAN AMERICAN: >60 ML/MIN
GFR SERPL CREATININE-BSD FRML MDRD: ABNORMAL ML/MIN/{1.73_M2}
GFR SERPL CREATININE-BSD FRML MDRD: ABNORMAL ML/MIN/{1.73_M2}
GLUCOSE BLD-MCNC: 87 MG/DL (ref 70–99)
HCT VFR BLD CALC: 30.8 % (ref 36–46)
HEMOGLOBIN: 10.3 G/DL (ref 12–16)
MCH RBC QN AUTO: 32.4 PG (ref 26–34)
MCHC RBC AUTO-ENTMCNC: 33.4 G/DL (ref 31–37)
MCV RBC AUTO: 96.9 FL (ref 80–100)
NRBC AUTOMATED: ABNORMAL PER 100 WBC
PDW BLD-RTO: 14.2 % (ref 11.5–14.5)
PLATELET # BLD: 116 K/UL (ref 130–400)
PMV BLD AUTO: 6.8 FL (ref 6–12)
POTASSIUM SERPL-SCNC: 4 MMOL/L (ref 3.7–5.3)
RBC # BLD: 3.18 M/UL (ref 4–5.2)
SODIUM BLD-SCNC: 143 MMOL/L (ref 135–144)
WBC # BLD: 2.2 K/UL (ref 3.5–11)

## 2018-03-01 PROCEDURE — 6370000000 HC RX 637 (ALT 250 FOR IP): Performed by: INTERNAL MEDICINE

## 2018-03-01 PROCEDURE — 2580000003 HC RX 258: Performed by: FAMILY MEDICINE

## 2018-03-01 PROCEDURE — 97116 GAIT TRAINING THERAPY: CPT

## 2018-03-01 PROCEDURE — 94640 AIRWAY INHALATION TREATMENT: CPT

## 2018-03-01 PROCEDURE — 36415 COLL VENOUS BLD VENIPUNCTURE: CPT

## 2018-03-01 PROCEDURE — 97110 THERAPEUTIC EXERCISES: CPT

## 2018-03-01 PROCEDURE — 2700000000 HC OXYGEN THERAPY PER DAY

## 2018-03-01 PROCEDURE — 6370000000 HC RX 637 (ALT 250 FOR IP): Performed by: FAMILY MEDICINE

## 2018-03-01 PROCEDURE — 85027 COMPLETE CBC AUTOMATED: CPT

## 2018-03-01 PROCEDURE — 2060000000 HC ICU INTERMEDIATE R&B

## 2018-03-01 PROCEDURE — 6360000002 HC RX W HCPCS: Performed by: FAMILY MEDICINE

## 2018-03-01 PROCEDURE — 80048 BASIC METABOLIC PNL TOTAL CA: CPT

## 2018-03-01 RX ORDER — AZITHROMYCIN 250 MG/1
250 TABLET, FILM COATED ORAL DAILY
Status: DISCONTINUED | OUTPATIENT
Start: 2018-03-01 | End: 2018-03-02 | Stop reason: HOSPADM

## 2018-03-01 RX ORDER — AZITHROMYCIN 250 MG/1
250 TABLET, FILM COATED ORAL DAILY
Qty: 10 TABLET | Refills: 0 | Status: SHIPPED | OUTPATIENT
Start: 2018-03-02 | End: 2018-03-12

## 2018-03-01 RX ORDER — SODIUM CHLORIDE 9 MG/ML
INJECTION, SOLUTION INTRAVENOUS CONTINUOUS
Status: DISCONTINUED | OUTPATIENT
Start: 2018-03-01 | End: 2018-03-02 | Stop reason: HOSPADM

## 2018-03-01 RX ADMIN — MOMETASONE FUROATE AND FORMOTEROL FUMARATE DIHYDRATE 2 PUFF: 200; 5 AEROSOL RESPIRATORY (INHALATION) at 21:13

## 2018-03-01 RX ADMIN — CARBIDOPA AND LEVODOPA 1 TABLET: 25; 100 TABLET ORAL at 20:55

## 2018-03-01 RX ADMIN — CARVEDILOL 6.25 MG: 6.25 TABLET, FILM COATED ORAL at 16:27

## 2018-03-01 RX ADMIN — SODIUM CHLORIDE: 9 INJECTION, SOLUTION INTRAVENOUS at 19:28

## 2018-03-01 RX ADMIN — CYANOCOBALAMIN TAB 1000 MCG 500 MCG: 1000 TAB at 09:23

## 2018-03-01 RX ADMIN — GABAPENTIN 300 MG: 300 CAPSULE ORAL at 09:22

## 2018-03-01 RX ADMIN — GABAPENTIN 300 MG: 300 CAPSULE ORAL at 20:55

## 2018-03-01 RX ADMIN — ISOSORBIDE MONONITRATE 60 MG: 60 TABLET ORAL at 09:22

## 2018-03-01 RX ADMIN — DIVALPROEX SODIUM 125 MG: 125 TABLET, DELAYED RELEASE ORAL at 20:55

## 2018-03-01 RX ADMIN — GUAIFENESIN 1200 MG: 600 TABLET, EXTENDED RELEASE ORAL at 20:55

## 2018-03-01 RX ADMIN — CARBIDOPA AND LEVODOPA 1 TABLET: 25; 100 TABLET ORAL at 09:23

## 2018-03-01 RX ADMIN — MOMETASONE FUROATE AND FORMOTEROL FUMARATE DIHYDRATE 2 PUFF: 200; 5 AEROSOL RESPIRATORY (INHALATION) at 08:56

## 2018-03-01 RX ADMIN — CARVEDILOL 6.25 MG: 6.25 TABLET, FILM COATED ORAL at 09:22

## 2018-03-01 RX ADMIN — HYDROCODONE BITARTRATE AND ACETAMINOPHEN 1 TABLET: 5; 325 TABLET ORAL at 23:08

## 2018-03-01 RX ADMIN — ALPRAZOLAM 0.5 MG: 0.5 TABLET ORAL at 01:15

## 2018-03-01 RX ADMIN — PANTOPRAZOLE SODIUM 40 MG: 40 TABLET, DELAYED RELEASE ORAL at 06:36

## 2018-03-01 RX ADMIN — ASPIRIN 81 MG 81 MG: 81 TABLET ORAL at 09:23

## 2018-03-01 RX ADMIN — AMLODIPINE BESYLATE 5 MG: 5 TABLET ORAL at 09:23

## 2018-03-01 RX ADMIN — DULOXETINE HYDROCHLORIDE 90 MG: 60 CAPSULE, DELAYED RELEASE ORAL at 09:22

## 2018-03-01 RX ADMIN — TIOTROPIUM BROMIDE 18 MCG: 18 CAPSULE ORAL; RESPIRATORY (INHALATION) at 08:55

## 2018-03-01 RX ADMIN — GUAIFENESIN 1200 MG: 600 TABLET, EXTENDED RELEASE ORAL at 09:22

## 2018-03-01 RX ADMIN — ALPRAZOLAM 0.5 MG: 0.5 TABLET ORAL at 20:54

## 2018-03-01 RX ADMIN — LOPERAMIDE HYDROCHLORIDE 2 MG: 2 CAPSULE ORAL at 09:34

## 2018-03-01 RX ADMIN — ENOXAPARIN SODIUM 40 MG: 40 INJECTION SUBCUTANEOUS at 09:23

## 2018-03-01 RX ADMIN — CARBIDOPA AND LEVODOPA 1 TABLET: 25; 100 TABLET ORAL at 12:40

## 2018-03-01 RX ADMIN — DIVALPROEX SODIUM 125 MG: 125 TABLET, DELAYED RELEASE ORAL at 16:27

## 2018-03-01 RX ADMIN — TRAZODONE HYDROCHLORIDE 50 MG: 50 TABLET ORAL at 20:55

## 2018-03-01 RX ADMIN — HYDROCODONE BITARTRATE AND ACETAMINOPHEN 1 TABLET: 5; 325 TABLET ORAL at 00:10

## 2018-03-01 RX ADMIN — AZITHROMYCIN 250 MG: 250 TABLET, FILM COATED ORAL at 12:40

## 2018-03-01 RX ADMIN — LEVOTHYROXINE SODIUM 25 MCG: 25 TABLET ORAL at 06:36

## 2018-03-01 RX ADMIN — LOPERAMIDE HYDROCHLORIDE 2 MG: 2 CAPSULE ORAL at 20:54

## 2018-03-01 RX ADMIN — DIVALPROEX SODIUM 125 MG: 125 TABLET, DELAYED RELEASE ORAL at 09:23

## 2018-03-01 RX ADMIN — CARBIDOPA AND LEVODOPA 1 TABLET: 25; 100 TABLET ORAL at 16:27

## 2018-03-01 ASSESSMENT — PAIN SCALES - GENERAL
PAINLEVEL_OUTOF10: 6
PAINLEVEL_OUTOF10: 5
PAINLEVEL_OUTOF10: 4
PAINLEVEL_OUTOF10: 6
PAINLEVEL_OUTOF10: 4

## 2018-03-01 ASSESSMENT — PAIN DESCRIPTION - ORIENTATION: ORIENTATION: RIGHT;LEFT

## 2018-03-01 ASSESSMENT — PAIN DESCRIPTION - PAIN TYPE: TYPE: CHRONIC PAIN

## 2018-03-01 ASSESSMENT — PAIN DESCRIPTION - LOCATION: LOCATION: HAND

## 2018-03-01 NOTE — PROGRESS NOTES
Physical Therapy  Facility/Department: [facility] PROGRESSIVE CARE  Daily Treatment Note  NAME: Mt Orozco  : 1940  MRN: [id number]  Discharge Recommendation:   [address]  Date of Service: 3/1/2018    Patient Diagnosis(es):   Patient Active Problem List    Diagnosis Date Noted    Chest pain 12/15/2013     Priority: High    COPD exacerbation ([address] 2018    Other dyspnea and respiratory abnormality     Acute respiratory failure with hypoxia and hypercapnia (HCC) 2016    Medication overdose 2016    Acute lower UTI (urinary tract infection) 2016    Anemia 2016    Somnolence 2016    Intractable low back pain     Metabolic encephalopathy     Acute cystitis 09/10/2015    Coronary artery disease involving native coronary artery with unstable angina pectoris ([address]     Essential hypertension     Lower urinary tract infectious disease 2015    Abnormal stress test 2015    Hypokalemia 2015    Jaw pain - Suspected Anginal Equilvant 2015    Thoracic back pain 2015    Shortness of breath 2015    Carotid stenosis 2014    Unstable angina ([address] 2014    Leg swelling 2014    CAD (coronary artery disease) 2014    Anxiety 12/15/2013    COPD (chronic obstructive pulmonary disease) ([address] 12/15/2013    Iron deficiency anemia 12/15/2013    Depression     Alcoholic peripheral neuropathy ([address] 12/15/2013    Parkinson's disease ([address] 12/15/2013    Restless leg syndrome 12/15/2013    Altered mental status 12/15/2013    Pneumonia 12/15/2013       Past Medical History:   Diagnosis Date    Airway obstruction     Angina effort ([address]     Anxiety     CAD (coronary artery disease)     s/p stents    Cancer ([address]     breast, right    Convulsions ([address]     COPD (chronic obstructive pulmonary disease) ([address]     Degenerative disc disease     Cervical    Depression  Depression     Diabetes mellitus [facility]     Esophageal reflux     Hypertension     unspecified    Inflammatory spondylopathy ([facility]     Iron deficiency     Lymphoma ([facility] 1993    MDRO (multiple drug resistant organisms) resistance 8/10/2014    E. Coli urine    MRSA (methicillin resistant staph aureus) culture positive 07/13/2016    nares    Neuropathy ([facility]     Osteoarthritis     Parkinson's disease ([facility]     Peripheral vascular disease ([facility]     Restless leg syndrome     Sleep apnea     Transient cerebral ischemia     Unsteady gait      Past Surgical History:   Procedure Laterality Date    BONE MARROW BIOPSY  11/29/2017    BREAST LUMPECTOMY      right     CHOLECYSTECTOMY      CORONARY ANGIOPLASTY WITH STENT PLACEMENT      FEMORAL-FEMORAL BYPASS GRAFT      HYSTERECTOMY      LUNG REMOVAL, PARTIAL      Lung CA        Restrictions  Restrictions/Precautions  Restrictions/Precautions: General Precautions, Fall Risk  Position Activity Restriction  Other position/activity restrictions: 2 liters 02 at night  Subjective   General  Chart Reviewed: Yes  Additional Pertinent Hx: Parkinsons, Breast Cancer, Lymphoma, TIA  Response To Previous Treatment: Patient with no complaints from previous session. Family / Caregiver Present: No  Subjective  Subjective: Patient reports she is very tired, but is agreeable to PT. General Comment  Comments: Heather Jiménez RN reports patient appropriate for PT.   Pain Screening  Patient Currently in Pain: Yes  Pain Assessment  Pain Assessment: 0-10  Pain Level: 4  Pain Type: Chronic pain  Pain Location: Hand  Pain Orientation: Right;Left  Pain Intervention(s): Repositioned  Response to Pain Intervention: Patient Satisfied  Vital Signs  Patient Currently in Pain: Yes       Orientation     Objective   Bed mobility  Rolling to Left: Minimal assistance  Rolling to Right: Minimal assistance  Supine to Sit: Minimal assistance  Sit to Supine: Minimal assistance  Scooting: Minimal assistance  Transfers  Sit to Stand: Contact guard assistance  Stand to sit: Contact guard assistance  Bed to Chair: Contact guard assistance  Stand Pivot Transfers: Contact guard assistance  Ambulation  Ambulation?: Yes  Ambulation 1  Surface: level tile  Device: Rolling Walker  Assistance: Minimal assistance  Quality of Gait: Short, shuffling steps, unsteady  Distance: 10 feet        Exercises  Comments: AP, LAQ, seated marches x 20      Assessment   Body structures, Functions, Activity limitations: Decreased functional mobility ; Decreased ADL status; Decreased strength;Decreased endurance;Decreased balance  Assessment: Patient with improved ambulation distance, but still unable to amb functional distance. Prognosis: Good  REQUIRES PT FOLLOW UP: Yes  Activity Tolerance  Activity Tolerance: Patient Tolerated treatment well       Discharge Recommendations:  Subacute/Skilled Nursing Facility      Goals  Short term goals  Time Frame for Short term goals: 12 visits  Short term goal 1: Patient will be indep with bed mobility and transfers. Short term goal 2: Patient will amb 100 feet with RW and supervision. Short term goal 3: Patient will have fair+ dynamic standing balance; Short term goal 4: Patient will tolerate 30 minutes of ther-ex and ther-act. Short term goal 5: Patient will be indep with HEP. Patient Goals   Patient goals : Improve mobility    Plan    Plan  Times per week: 1-2x/d, 5-6 d/wk  Current Treatment Recommendations: Strengthening, Balance Training, Functional Mobility Training, Transfer Training, Endurance Training, Gait Training, Home Exercise Program, Safety Education & Training, Patient/Caregiver Education & Training  Safety Devices  Type of devices:  All fall risk precautions in place, Call light within reach, Chair alarm in place, Gait belt, Left in chair, Nurse notified  Restraints  Initially in place: No     Therapy Time   Individual Concurrent Group Co-treatment   Time In 1325         Time

## 2018-03-01 NOTE — CARE COORDINATION
After hour discharge information   Call [facility] to inform of discharge and give report [phone] ask for ext.  101, fax discharge information to [phone]
Discharge planning    Both therapy has recommending snf for patient. Patient is agreeable to skilled side of [facility].  Notified social work
Discharge planning    Chart reviewed and per progress notes patient from [address] [place] [address] Call to notify social work to see if long term. Placed ABHILASH in epic and will follow.
Pt has been accepted at [facility] to be skilled at discharge. SW informed pt RN Gabriella Leonardo regarding approval.  Gabriella Leonardo reports pt not ready for discharge today.
Yes    Discharge Plan:   Patient lives at the assisted living at [address] [place] [address] in [address]. Her apartment is on the first level. She uses her wheelchair and walker during the day. She has 02 currently only at night with concentrator but does not know the company. She is medicare. Call to [facility] and they do not have her under their contract. Patient has had home care at this time and has spent time at the [address] [place] [address] skilled side when needed. She is open to both going back to skilled side if needed and home care. Will need to have home 02 evaluation prior to dc and will place ABHILASH in [facility]. Will see what PT and OT recommending before making final plan between home care and skilled.          Electronically signed by Celia Ruvalcaba RN on 2/27/18 at 1:08 PM

## 2018-03-01 NOTE — PROGRESS NOTES
mg BID WC   divalproex (DEPAKOTE) DR tablet 125 mg TID   DULoxetine (CYMBALTA) extended release capsule 90 mg Daily   mometasone-formoterol (DULERA) 200-5 MCG/ACT inhaler 2 puff BID   gabapentin (NEURONTIN) capsule 300 mg BID   guaiFENesin (MUCINEX) extended release tablet 1,200 mg BID   HYDROcodone-acetaminophen (NORCO) 5-325 MG per tablet 1 tablet Q4H PRN   hyoscyamine (LEVSIN/SL) sublingual tablet 125 mcg BID PRN   ipratropium (ATROVENT) 0.02 % nebulizer solution 0.5 mg 4x Daily PRN   isosorbide mononitrate (IMDUR) extended release tablet 60 mg Daily   albuterol (PROVENTIL) nebulizer solution 1.25 mg Q6H PRN   levothyroxine (SYNTHROID) tablet 25 mcg Daily   lidocaine (LIDODERM) 5 % 1 patch Daily   loperamide (IMODIUM) capsule 2 mg 4x Daily PRN   nitroGLYCERIN (NITROSTAT) SL tablet 0.4 mg Q5 Min PRN   polyethylene glycol (GLYCOLAX) packet 17 g Daily   sennosides-docusate sodium (SENOKOT-S) 8.6-50 MG tablet 1 tablet Daily   tiotropium (SPIRIVA) inhalation capsule 18 mcg Daily   traZODone (DESYREL) tablet 50 mg Nightly   vitamin B-12 (CYANOCOBALAMIN) tablet 500 mcg Daily       Data:     Code Status:  Full Code    Family History   Problem Relation Age of Onset    Kidney Disease Mother     Cancer Mother     Heart Disease Father     Coronary Art Dis Sister        Social History     Social History    Marital status: Single     Spouse name: N/A    Number of children: N/A    Years of education: N/A     Occupational History    Not on file.      Social History Main Topics    Smoking status: Current Every Day Smoker     Packs/day: 0.50     Types: Cigarettes    Smokeless tobacco: Never Used    Alcohol use No    Drug use: No    Sexual activity: Not on file     Other Topics Concern    Not on file     Social History Narrative    No narrative on file       Vitals:  BP (!) 126/44   Pulse 67   Temp 97.3 °F (36.3 °C) (Oral)   Resp 12   Ht 4' 11\" (1.499 m)   Wt 127 lb 6.4 oz (57.8 kg)   SpO2 95%   BMI 25.73 kg/m² Temp (24hrs), Av.1 °F (36.7 °C), Min:97.3 °F (36.3 °C), Max:98.8 °F (37.1 °C)    No results for input(s): POCGLU in the last 72 hours. I/O (24Hr): Intake/Output Summary (Last 24 hours) at 18 1632  Last data filed at 18 1630   Gross per 24 hour   Intake              521 ml   Output                0 ml   Net              521 ml       Labs:  [unfilled]    Physical Examination:        General appearance: alert, cooperative and no distress  Mental Status: oriented to person, place and time and normal affect  Lungs: clear to auscultation bilaterally, normal effort  Heart: regular rate and rhythm, no murmur  Abdomen: soft, nontender, nondistended, bowel sounds present all four quadrants, no masses, hepatomegaly or splenomegaly  Extremities: no edema, redness or tenderness in the calves  Skin: no gross lesions, rashes, or induration    Assessment:        Primary Problem  <principal problem not specified>     Active Hospital Problems    Diagnosis Date Noted    COPD exacerbation ([facility] [J44.1] 2018    COPD (chronic obstructive pulmonary disease) ([facility] [J44.9] 12/15/2013     Past Medical History:   Diagnosis Date    Airway obstruction     Angina effort ([address]     Anxiety     CAD (coronary artery disease)     s/p stents    Cancer ([address]     breast, right    Convulsions ([address]     COPD (chronic obstructive pulmonary disease) ([address]     Degenerative disc disease     Cervical    Depression     Depression     Diabetes mellitus ([address]     Esophageal reflux     Hypertension     unspecified    Inflammatory spondylopathy ([address]     Iron deficiency     Lymphoma ([address]     MDRO (multiple drug resistant organisms) resistance 8/10/2014    E.  Coli urine    MRSA (methicillin resistant staph aureus) culture positive 2016    nares    Neuropathy ([address]     Osteoarthritis     Parkinson's disease ([address]     Peripheral vascular disease (HCC)     Restless leg syndrome     Sleep apnea    

## 2018-03-01 NOTE — PROGRESS NOTES
Wanda Chang is a 78 y.o. female patient.     Current Facility-Administered Medications   Medication Dose Route Frequency Provider Last Rate Last Dose    diphenoxylate-atropine (LOMOTIL) 2.5-0.025 MG per tablet 2 tablet  2 tablet Oral 4x Daily PRN Darene Apley, DO        pantoprazole (PROTONIX) tablet 40 mg  40 mg Oral QAM AC Darene Apley, DO   40 mg at 03/01/18 0636    sodium chloride flush 0.9 % injection 10 mL  10 mL Intravenous 2 times per day Darene Apley, DO   10 mL at 02/27/18 2151    sodium chloride flush 0.9 % injection 10 mL  10 mL Intravenous PRN Darene Apley, DO        acetaminophen (TYLENOL) tablet 650 mg  650 mg Oral Q4H PRN Darene Apley, DO        magnesium hydroxide (MILK OF MAGNESIA) 400 MG/5ML suspension 30 mL  30 mL Oral Daily PRN Darene Apley, DO        ondansetron [facility]) injection 4 mg  4 mg Intravenous Q6H PRN Darene Apley, DO        enoxaparin (LOVENOX) injection 40 mg  40 mg Subcutaneous Daily Darene Apley, DO   40 mg at 03/01/18 9674    azithromycin (ZITHROMAX) 500 mg in D5W 250ml addavial  500 mg Intravenous Q24H Darene Apley, DO   Stopped at 02/28/18 1352    ALPRAZolam Samantha Fonseca) tablet 0.5 mg  0.5 mg Oral TID PRN Darene Apley, DO   0.5 mg at 03/01/18 0115    amLODIPine (NORVASC) tablet 5 mg  5 mg Oral Daily Darene Apley, DO   5 mg at 03/01/18 2713    aspirin chewable tablet 81 mg  81 mg Oral Daily Darene Apley, DO   81 mg at 03/01/18 1114    budesonide (PULMICORT) nebulizer suspension 500 mcg  500 mcg Nebulization Q12H PRN Darene Apley, DO        carbidopa-levodopa (SINEMET)  MG per tablet 1 tablet  1 tablet Oral 4x Daily Darene Apley, DO   1 tablet at 03/01/18 0374    carvedilol (COREG) tablet 6.25 mg  6.25 mg Oral BID WC Darene Apley, DO   6.25 mg at 03/01/18 9658    divalproex (DEPAKOTE) DR tablet 125 mg  125 mg Oral TID Darene Apley, DO   125 mg at 03/01/18 4928    DULoxetine (CYMBALTA) extended release capsule 90 mg  90 mg Oral Daily Boris Ortiz, DO   90 mg at 03/01/18 5914    mometasone-formoterol (DULERA) 200-5 MCG/ACT inhaler 2 puff  2 puff Inhalation BID Boris Ortiz, DO   2 puff at 03/01/18 5611    gabapentin (NEURONTIN) capsule 300 mg  300 mg Oral BID Boris Ortiz, DO   300 mg at 03/01/18 7201    guaiFENesin [facility]) extended release tablet 1,200 mg  1,200 mg Oral BID Boris Ortiz, DO   1,200 mg at 03/01/18 3821    HYDROcodone-acetaminophen (NORCO) 5-325 MG per tablet 1 tablet  1 tablet Oral Q4H PRN Boris Ortiz, DO   1 tablet at 03/01/18 0010    hyoscyamine (LEVSIN/SL) sublingual tablet 125 mcg  125 mcg Sublingual BID PRN Boris Ortiz,         ipratropium (ATROVENT) 0.02 % nebulizer solution 0.5 mg  0.5 mg Nebulization 4x Daily PRN Boris Ortiz DO        isosorbide mononitrate (IMDUR) extended release tablet 60 mg  60 mg Oral Daily Boris Ortiz, DO   60 mg at 03/01/18 0337    albuterol (PROVENTIL) nebulizer solution 1.25 mg  1.25 mg Nebulization Q6H PRN Boris Ortiz DO        levothyroxine (SYNTHROID) tablet 25 mcg  25 mcg Oral Daily Boris Ortiz, DO   25 mcg at 03/01/18 0636    lidocaine (LIDODERM) 5 % 1 patch  1 patch Transdermal Daily Boris Ortiz, DO   1 patch at 03/01/18 2955    loperamide (IMODIUM) capsule 2 mg  2 mg Oral 4x Daily PRN Boris Ortiz, DO   2 mg at 02/28/18 2036    nitroGLYCERIN (NITROSTAT) SL tablet 0.4 mg  0.4 mg Sublingual Q5 Min PRN Boris Ortiz DO        polyethylene glycol [facility]) packet 17 g  17 g Oral Daily Boris Ortiz DO        sennosides-docusate sodium (SENOKOT-S) 8.6-50 MG tablet 1 tablet  1 tablet Oral Daily Boris Ortiz, DO   1 tablet at 02/28/18 0905    tiotropium [facility]) inhalation capsule 18 mcg  18 mcg Inhalation Daily Boris Ortiz DO   18 mcg at 03/01/18 5671    traZODone (DESYREL) tablet 50 mg  50 mg Oral Nightly Boris Ortiz DO   50 mg at 02/28/18 2036    vitamin B-12 (CYANOCOBALAMIN) tablet

## 2018-03-02 VITALS
HEIGHT: 59 IN | RESPIRATION RATE: 16 BRPM | BODY MASS INDEX: 25.68 KG/M2 | HEART RATE: 92 BPM | OXYGEN SATURATION: 99 % | SYSTOLIC BLOOD PRESSURE: 110 MMHG | TEMPERATURE: 97.9 F | DIASTOLIC BLOOD PRESSURE: 45 MMHG | WEIGHT: 127.4 LBS

## 2018-03-02 PROCEDURE — 2580000003 HC RX 258: Performed by: FAMILY MEDICINE

## 2018-03-02 PROCEDURE — 6370000000 HC RX 637 (ALT 250 FOR IP): Performed by: FAMILY MEDICINE

## 2018-03-02 PROCEDURE — 6370000000 HC RX 637 (ALT 250 FOR IP): Performed by: INTERNAL MEDICINE

## 2018-03-02 PROCEDURE — 97530 THERAPEUTIC ACTIVITIES: CPT | Performed by: NURSE PRACTITIONER

## 2018-03-02 PROCEDURE — 2700000000 HC OXYGEN THERAPY PER DAY

## 2018-03-02 PROCEDURE — 94640 AIRWAY INHALATION TREATMENT: CPT

## 2018-03-02 PROCEDURE — 6360000002 HC RX W HCPCS: Performed by: FAMILY MEDICINE

## 2018-03-02 PROCEDURE — 94760 N-INVAS EAR/PLS OXIMETRY 1: CPT

## 2018-03-02 RX ADMIN — ISOSORBIDE MONONITRATE 60 MG: 60 TABLET ORAL at 09:04

## 2018-03-02 RX ADMIN — CARBIDOPA AND LEVODOPA 1 TABLET: 25; 100 TABLET ORAL at 09:05

## 2018-03-02 RX ADMIN — ENOXAPARIN SODIUM 40 MG: 40 INJECTION SUBCUTANEOUS at 09:06

## 2018-03-02 RX ADMIN — DIVALPROEX SODIUM 125 MG: 125 TABLET, DELAYED RELEASE ORAL at 13:20

## 2018-03-02 RX ADMIN — DIVALPROEX SODIUM 125 MG: 125 TABLET, DELAYED RELEASE ORAL at 09:04

## 2018-03-02 RX ADMIN — CARVEDILOL 6.25 MG: 6.25 TABLET, FILM COATED ORAL at 09:05

## 2018-03-02 RX ADMIN — LEVOTHYROXINE SODIUM 25 MCG: 25 TABLET ORAL at 05:34

## 2018-03-02 RX ADMIN — Medication 10 ML: at 15:40

## 2018-03-02 RX ADMIN — MOMETASONE FUROATE AND FORMOTEROL FUMARATE DIHYDRATE 2 PUFF: 200; 5 AEROSOL RESPIRATORY (INHALATION) at 09:49

## 2018-03-02 RX ADMIN — ASPIRIN 81 MG 81 MG: 81 TABLET ORAL at 09:05

## 2018-03-02 RX ADMIN — Medication 500 UNITS: at 15:40

## 2018-03-02 RX ADMIN — GABAPENTIN 300 MG: 300 CAPSULE ORAL at 09:04

## 2018-03-02 RX ADMIN — GUAIFENESIN 1200 MG: 600 TABLET, EXTENDED RELEASE ORAL at 09:05

## 2018-03-02 RX ADMIN — DULOXETINE HYDROCHLORIDE 90 MG: 60 CAPSULE, DELAYED RELEASE ORAL at 09:06

## 2018-03-02 RX ADMIN — TIOTROPIUM BROMIDE 18 MCG: 18 CAPSULE ORAL; RESPIRATORY (INHALATION) at 09:49

## 2018-03-02 RX ADMIN — AMLODIPINE BESYLATE 5 MG: 5 TABLET ORAL at 09:05

## 2018-03-02 RX ADMIN — CYANOCOBALAMIN TAB 1000 MCG 500 MCG: 1000 TAB at 05:00

## 2018-03-02 RX ADMIN — CARBIDOPA AND LEVODOPA 1 TABLET: 25; 100 TABLET ORAL at 15:39

## 2018-03-02 RX ADMIN — PANTOPRAZOLE SODIUM 40 MG: 40 TABLET, DELAYED RELEASE ORAL at 05:35

## 2018-03-02 RX ADMIN — ALPRAZOLAM 0.5 MG: 0.5 TABLET ORAL at 05:35

## 2018-03-02 RX ADMIN — HYDROCODONE BITARTRATE AND ACETAMINOPHEN 1 TABLET: 5; 325 TABLET ORAL at 03:36

## 2018-03-02 RX ADMIN — AZITHROMYCIN 250 MG: 250 TABLET, FILM COATED ORAL at 09:05

## 2018-03-02 RX ADMIN — CARBIDOPA AND LEVODOPA 1 TABLET: 25; 100 TABLET ORAL at 13:21

## 2018-03-02 ASSESSMENT — PAIN SCALES - GENERAL
PAINLEVEL_OUTOF10: 4
PAINLEVEL_OUTOF10: 5

## 2018-03-02 NOTE — PLAN OF CARE
Problem: Risk for Impaired Skin Integrity  Goal: Tissue integrity - skin and mucous membranes  Structural intactness and normal physiological function of skin and  mucous membranes.    Outcome: Ongoing      Problem: Falls - Risk of  Goal: Absence of falls  Outcome: Ongoing      Problem: Pain:  Goal: Pain level will decrease  Pain level will decrease   Outcome: Ongoing    Goal: Control of chronic pain  Control of chronic pain   Outcome: Ongoing      Problem: Breathing Pattern - Ineffective:  Goal: Ability to achieve and maintain a regular respiratory rate will improve  Ability to achieve and maintain a regular respiratory rate will improve   Outcome: Ongoing      Problem: IP BALANCE  Goal: LTG - Patient will maintain balance to allow for safe/functional mobility  Outcome: Ongoing

## 2018-03-02 NOTE — PROGRESS NOTES
Occupational Therapy  Facility/Department: [facility] PROGRESSIVE CARE  Daily Treatment Note  NAME: Mt Orozco  : 1940  MRN: [id number]  Discharge Recommendation:   [address]    Date of Service: 3/2/2018    Patient Diagnosis(es): The encounter diagnosis was Acute exacerbation of chronic obstructive pulmonary disease (COPD) ([facility] has a past medical history of Airway obstruction; Angina effort ([facility] Anxiety; CAD (coronary artery disease); Cancer ([facility] Convulsions ([address] COPD (chronic obstructive pulmonary disease) ([address] Degenerative disc disease; Depression; Depression; Diabetes mellitus ([facility] Esophageal reflux; Hypertension; Inflammatory spondylopathy ([facility] Iron deficiency; Lymphoma ([facility] MDRO (multiple drug resistant organisms) resistance; MRSA (methicillin resistant staph aureus) culture positive; Neuropathy ([facility] Osteoarthritis; Parkinson's disease ([address] Peripheral vascular disease ([address] Restless leg syndrome; Sleep apnea; Transient cerebral ischemia; and Unsteady gait. has a past surgical history that includes Cholecystectomy; Hysterectomy; Lung removal, partial; Breast lumpectomy; Coronary angioplasty with stent; Femoral-femoral Bypass Graft; and bone marrow biopsy (2017). Restrictions  Restrictions/Precautions  Restrictions/Precautions: General Precautions, Fall Risk  Position Activity Restriction  Other position/activity restrictions:  Up with assist  Subjective   General  Chart Reviewed: Yes  Patient assessed for rehabilitation services?: Yes  Response to previous treatment: Patient with no complaints from previous session  Family / Caregiver Present: Yes (Nephew at start of tx)  General Comment  Comments: Pt declining EOB and OOB activity due to just receiving lunch and fatigue      Orientation  Orientation  Overall Orientation Status: Within Normal Limits  Objective    ADL  Feeding: Setup         Attendance  Participation: Active participation  Cognition  Overall Cognitive Status: Exceptions  Safety Judgement: Decreased awareness of need for safety  Problem Solving: Assistance required to generate solutions;Assistance required to implement solutions;Assistance required to correct errors made  Insights: Decreased awareness of deficits  Cognition Comment: Pt demo decreased cognition AEB decreased carryover and retention of edu provided     Perception  Overall Perceptual Status: [facility]        Additional Activities Comment  Additional Activities: Pt supine in bed. Declining OOB and EOB activity due to sore on coccyx. Edu provided on importance of OOB activity and pressure relief. Poor carryover noted. Edu provided on OT POC, positioning, discharge plan. Pt is at risk for skin breakdown. Patient positioned appropriately after treatment with all high risk areas elevated or propped. Air mattress is inflated to appropriate level. Pt reports being comfortable at end of treatment. Assessment   Performance deficits / Impairments: Decreased functional mobility ; Decreased ADL status; Decreased strength;Decreased safe awareness;Decreased endurance;Decreased balance  Prognosis: Good  Patient Education: OT POC, discharge recommendations, positioning, pressure relief  Discharge Recommendations: Subacute/Skilled Nursing Facility  REQUIRES OT FOLLOW UP: Yes  Activity Tolerance  Activity Tolerance: Patient Tolerated treatment well  Safety Devices  Safety Devices in place: Yes  Type of devices: All fall risk precautions in place; Left in bed;Call light within reach;Nurse notified; Patient at risk for falls       Discharge Recommendations:  [address]   Patient would benefit from SNF for continued occupational therapy to increase independence with  ADL of bathing, dressing, toileting and grooming.  Writer recommending SNF placement for for activity tolerance and strength which will increase independence with ADL's coordinated with

## 2018-03-02 NOTE — PROGRESS NOTES
Pulmonary Critical Care Progress Note  Virgil Christensen MD     Patient seen for the follow up of chronic respiratory failure, acute exacerbation of COPD, tracheobronchitis, active smoker, anxiety disorder, tremors    Subjective:  She has occasional dry cough. She denies significant chest pain. Her shortness of breath is better. She is tolerating orals fairly well. Examination:  Vitals: BP (!) 110/36   Pulse 64   Temp 98.1 °F (36.7 °C) (Oral)   Resp 16   Ht 4' 11\" (1.499 m)   Wt 127 lb 6.4 oz (57.8 kg)   SpO2 95%   BMI 25.73 kg/m²   General appearance: alert and cooperative with exam  Neck: No JVD  Lungs: clear to auscultation bilaterally and diminished breath sounds bilaterally  Heart: regular rate and rhythm, S1, S2 normal, no gallop  Abdomen: Soft, non tender, + BS  Extremities: no cyanosis or clubbing.  No significant edema    LABs:  CBC:   Recent Labs      02/28/18   0632  03/01/18   0636   WBC  2.7*  2.2*   HGB  10.4*  10.3*   HCT  30.9*  30.8*   PLT  131  116*     BMP:   Recent Labs      02/28/18   0632  03/01/18   0636   NA  144  143   K  3.5*  4.0   CO2  35*  33*   BUN  15  13   CREATININE  0.65  0.71   LABGLOM  >60  >60   GLUCOSE  89  87       Lab Results   Component Value Date    POCPH 7.36 02/26/2018    PHART 7.41 03/01/2013    POCPCO2 67 02/26/2018    DJN0YIL 48 03/01/2013    POCPO2 65 02/26/2018    PO2ART 72 03/01/2013    POCHCO3 37.1 02/26/2018    WLN8YGP 29.8 03/01/2013    NBEA NOT REPORTED 02/26/2018    PBEA 12 02/26/2018    FNL9KAM 39 02/26/2018    OPOH9DTM 91 02/26/2018    F9LNUFWC 93.7 03/01/2013    FIO2 28.0 02/26/2018     Radiology:  2/28/18      Impression:  · Chronic respiratory failure  · Acute exacerbation of COPD, mild  · Tracheo-bronchitis  · Active smoker  · DM/HTN/CAD  · Anxiety disorder/tremors    Recommendations:  · Change IV Zithromax to oral for 2 more days  · Albuterol Q 4 hours and prn  · Dulera 200  · Spiriva  · Incentive spirometry every hour while awake   · 2 liters/min via nasal cannula  · DVT prophylaxis with low molecular weight heparin  · OK for discharge planning from pulmonary stand point with follow up with pulmonary in 2-3 weeks. Plan was discussed with patient and she understands it. We will sign off.     Jesse Short MD, [facility]  Pulmonary Critical Care and Sleep Medicine,  [facility]  Cell: [phone]  Office: [phone]

## 2018-03-02 NOTE — PROGRESS NOTES
Alia Garcia, DO   125 mg at 03/01/18 2055    DULoxetine (CYMBALTA) extended release capsule 90 mg  90 mg Oral Daily Shante Priestly, DO   90 mg at 03/01/18 7538    mometasone-formoterol (DULERA) 200-5 MCG/ACT inhaler 2 puff  2 puff Inhalation BID Shante Priestly, DO   2 puff at 03/01/18 2113    gabapentin (NEURONTIN) capsule 300 mg  300 mg Oral BID Shante Priestly, DO   300 mg at 03/01/18 2055    guaiFENesin [facility]) extended release tablet 1,200 mg  1,200 mg Oral BID Shante Priestly, DO   1,200 mg at 03/01/18 2055    HYDROcodone-acetaminophen (NORCO) 5-325 MG per tablet 1 tablet  1 tablet Oral Q4H PRN Shante Priestly, DO   1 tablet at 03/02/18 9361    hyoscyamine (LEVSIN/SL) sublingual tablet 125 mcg  125 mcg Sublingual BID PRN Shante Priestly, DO        ipratropium (ATROVENT) 0.02 % nebulizer solution 0.5 mg  0.5 mg Nebulization 4x Daily PRN Shante Priestly, DO        isosorbide mononitrate (IMDUR) extended release tablet 60 mg  60 mg Oral Daily Shante Priestly, DO   60 mg at 03/01/18 0112    albuterol (PROVENTIL) nebulizer solution 1.25 mg  1.25 mg Nebulization Q6H PRN Shante Priestly, DO        levothyroxine (SYNTHROID) tablet 25 mcg  25 mcg Oral Daily Shante Priestly, DO   25 mcg at 03/02/18 0534    lidocaine (LIDODERM) 5 % 1 patch  1 patch Transdermal Daily Shante Priestly, DO   1 patch at 03/01/18 9199    loperamide (IMODIUM) capsule 2 mg  2 mg Oral 4x Daily PRN Shante Priestly, DO   2 mg at 03/01/18 2054    nitroGLYCERIN (NITROSTAT) SL tablet 0.4 mg  0.4 mg Sublingual Q5 Min PRN Shante Priestly, DO        polyethylene glycol [facility]) packet 17 g  17 g Oral Daily Shante Priestly, DO        sennosides-docusate sodium (SENOKOT-S) 8.6-50 MG tablet 1 tablet  1 tablet Oral Daily Shante Priestly, DO   1 tablet at 02/28/18 0905    tiotropium [facility]) inhalation capsule 18 mcg  18 mcg Inhalation Daily Shante Priestly, DO   18 mcg at 03/01/18 0855    traZODone (DESYREL) tablet 50 mg  50 mg Oral Nightly Boris Ortiz, DO   50 mg at 03/01/18 2055    vitamin B-12 (CYANOCOBALAMIN) tablet 500 mcg  500 mcg Oral Daily Boris Ortiz, DO   500 mcg at 03/01/18 5994     Allergies   Allergen Reactions    Naprosyn [Naproxen] Hives    Acetate     Actonel [Risedronate Sodium]     Albuterol Sulfate     Atenolol     Cortisone     Requip [Ropinirole Hcl]      Active Problems:    COPD (chronic obstructive pulmonary disease) (HCC)    COPD exacerbation (HCC)  Resolved Problems:    * No resolved hospital problems. *    Blood pressure (!) 149/51, pulse 58, temperature 98.4 °F (36.9 °C), temperature source Oral, resp. rate 16, height 4' 11\" (1.499 m), weight 127 lb 6.4 oz (57.8 kg), SpO2 98 %. Subjective:  Symptoms:  (Resting comfortably. ). Objective:  General Appearance:  Comfortable. Vital signs: (most recent): Blood pressure (!) 149/51, pulse 58, temperature 98.4 °F (36.9 °C), temperature source Oral, resp. rate 16, height 4' 11\" (1.499 m), weight 127 lb 6.4 oz (57.8 kg), SpO2 98 %. Vital signs are normal.    Lungs:  Normal effort. Heart: Normal rate. Neurological: (Resting comfortably. Tremor vastly Improved. ). Assessment:  (Encephalopathy. Tremor. Tremors vastly improved. Encephalopathy clearing nicely. Will sign off at this time. Happy to return if needed. ).        Manan Barry MD  3/2/2018

## 2018-03-03 NOTE — DISCHARGE SUMMARY
as: XOPENEX     levothyroxine 25 MCG tablet  Commonly known as:  SYNTHROID     LIDOCAINE (LIDODERM) 5% PATCH AND REMOVAL     MIRALAX powder  Generic drug:  polyethylene glycol     nitroGLYCERIN 0.4 MG SL tablet  Commonly known as:  NITROSTAT  Place 1 tablet under the tongue every 5 minutes as needed for Chest pain. senna-docusate 8.6-50 MG per tablet  Commonly known as:  Jose Poke 18 MCG inhalation capsule  Generic drug:  tiotropium     traZODone 50 MG tablet  Commonly known as:  DESYREL     vitamin B-12 500 MCG tablet  Commonly known as:  CYANOCOBALAMIN        STOP taking these medications    loperamide 2 MG capsule  Commonly known as:  IMODIUM     potassium chloride 10 MEQ extended release capsule  Commonly known as:  MICRO-K           Where to Get Your Medications      These medications were sent to [address] [place] [address]  [address] [address], [address] [address]    Phone:  [phone]   · azithromycin 250 MG tablet     You can get these medications from any pharmacy    You don't need a prescription for these medications  · magnesium hydroxide 400 MG/5ML suspension         Time Spent on discharge is  30 mins in patient examination, evaluation, counseling as well as medication reconciliation, prescriptions for required medications, discharge plan and follow up. Electronically signed by Darby Dorman DO on 3/3/2018 at 2:36 PM     Thank you Dr. Dyson primary care provider on file. for the opportunity to be involved in this patient's care.

## 2018-04-30 ENCOUNTER — HOSPITAL ENCOUNTER (OUTPATIENT)
Age: 78
Setting detail: SPECIMEN
Discharge: HOME | End: 2018-04-30
Payer: MEDICARE

## 2018-04-30 LAB
ABSOLUTE EOS #: 0.1 K/UL (ref 0–0.44)
ABSOLUTE IMMATURE GRANULOCYTE: 0.02 K/UL (ref 0–0.3)
ABSOLUTE LYMPH #: 0.7 K/UL (ref 1.1–3.7)
ABSOLUTE MONO #: 0.26 K/UL (ref 0.1–1.2)
BASOPHILS # BLD: 1 % (ref 0–2)
BASOPHILS ABSOLUTE: 0.02 K/UL (ref 0–0.2)
DIFFERENTIAL TYPE: ABNORMAL
EOSINOPHILS RELATIVE PERCENT: 5 % (ref 1–4)
HCT VFR BLD CALC: 28.9 % (ref 36.3–47.1)
HEMOGLOBIN: 9 G/DL (ref 11.9–15.1)
IMMATURE GRANULOCYTES: 1 %
LYMPHOCYTES # BLD: 35 % (ref 24–43)
MCH RBC QN AUTO: 31.7 PG (ref 25.2–33.5)
MCHC RBC AUTO-ENTMCNC: 31.1 G/DL (ref 28.4–34.8)
MCV RBC AUTO: 101.8 FL (ref 82.6–102.9)
MONOCYTES # BLD: 13 % (ref 3–12)
MORPHOLOGY: ABNORMAL
NRBC AUTOMATED: 0 PER 100 WBC
PDW BLD-RTO: 15.1 % (ref 11.8–14.4)
PLATELET # BLD: 117 K/UL (ref 138–453)
PLATELET ESTIMATE: ABNORMAL
PMV BLD AUTO: 10.2 FL (ref 8.1–13.5)
RBC # BLD: 2.84 M/UL (ref 3.95–5.11)
RBC # BLD: ABNORMAL 10*6/UL
SEG NEUTROPHILS: 45 % (ref 36–65)
SEGMENTED NEUTROPHILS ABSOLUTE COUNT: 0.9 K/UL (ref 1.5–8.1)
WBC # BLD: 2 K/UL (ref 3.5–11.3)
WBC # BLD: ABNORMAL 10*3/UL

## 2018-04-30 PROCEDURE — P9603 ONE-WAY ALLOW PRORATED MILES: HCPCS

## 2018-04-30 PROCEDURE — 85025 COMPLETE CBC W/AUTO DIFF WBC: CPT

## 2018-04-30 PROCEDURE — 36415 COLL VENOUS BLD VENIPUNCTURE: CPT

## 2018-05-05 ENCOUNTER — APPOINTMENT (OUTPATIENT)
Dept: GENERAL RADIOLOGY | Age: 78
DRG: 189 | End: 2018-05-05
Payer: MEDICARE

## 2018-05-05 ENCOUNTER — HOSPITAL ENCOUNTER (INPATIENT)
Age: 78
LOS: 3 days | Discharge: NURSING, REHAB OR LONG-TERM CARE | DRG: 189 | End: 2018-05-08
Attending: EMERGENCY MEDICINE | Admitting: INTERNAL MEDICINE
Payer: MEDICARE

## 2018-05-05 ENCOUNTER — APPOINTMENT (OUTPATIENT)
Dept: CT IMAGING | Age: 78
DRG: 189 | End: 2018-05-05
Payer: MEDICARE

## 2018-05-05 DIAGNOSIS — J44.1 COPD EXACERBATION (HCC): ICD-10-CM

## 2018-05-05 DIAGNOSIS — R41.82 ALTERED MENTAL STATUS, UNSPECIFIED ALTERED MENTAL STATUS TYPE: Primary | ICD-10-CM

## 2018-05-05 PROBLEM — I95.9 HYPOTENSION: Status: ACTIVE | Noted: 2018-05-05

## 2018-05-05 PROBLEM — R29.6 FREQUENT FALLS: Status: ACTIVE | Noted: 2018-05-05

## 2018-05-05 PROBLEM — R09.02 HYPOXEMIA: Status: ACTIVE | Noted: 2018-05-05

## 2018-05-05 PROBLEM — D64.9 ANEMIA: Status: ACTIVE | Noted: 2018-05-05

## 2018-05-05 LAB
-: ABNORMAL
ABSOLUTE EOS #: 0.08 K/UL (ref 0–0.4)
ABSOLUTE IMMATURE GRANULOCYTE: ABNORMAL K/UL (ref 0–0.3)
ABSOLUTE LYMPH #: 0.55 K/UL (ref 1–4.8)
ABSOLUTE MONO #: 0.29 K/UL (ref 0.2–0.8)
ALBUMIN SERPL-MCNC: 2.9 G/DL (ref 3.5–5.2)
ALBUMIN/GLOBULIN RATIO: ABNORMAL (ref 1–2.5)
ALP BLD-CCNC: 49 U/L (ref 35–104)
ALT SERPL-CCNC: <5 U/L (ref 5–33)
AMMONIA: 59 UMOL/L (ref 11–51)
AMORPHOUS: ABNORMAL
ANION GAP SERPL CALCULATED.3IONS-SCNC: 7 MMOL/L (ref 9–17)
AST SERPL-CCNC: 9 U/L
BACTERIA: ABNORMAL
BASOPHILS # BLD: 0 %
BASOPHILS ABSOLUTE: 0 K/UL (ref 0–0.2)
BILIRUB SERPL-MCNC: 0.48 MG/DL (ref 0.3–1.2)
BILIRUBIN URINE: NEGATIVE
BNP INTERPRETATION: ABNORMAL
BUN BLDV-MCNC: 15 MG/DL (ref 8–23)
BUN/CREAT BLD: 12 (ref 9–20)
CALCIUM SERPL-MCNC: 8.2 MG/DL (ref 8.6–10.4)
CASTS UA: ABNORMAL /LPF
CHLORIDE BLD-SCNC: 104 MMOL/L (ref 98–107)
CO2: 33 MMOL/L (ref 20–31)
COLOR: YELLOW
COMMENT UA: ABNORMAL
CREAT SERPL-MCNC: 1.21 MG/DL (ref 0.5–0.9)
CRYSTALS, UA: ABNORMAL /HPF
DATE, STOOL #1: NORMAL
DATE, STOOL #2: NORMAL
DATE, STOOL #3: NORMAL
DIFFERENTIAL TYPE: ABNORMAL
EKG ATRIAL RATE: 77 BPM
EKG P AXIS: 59 DEGREES
EKG P-R INTERVAL: 132 MS
EKG Q-T INTERVAL: 380 MS
EKG QRS DURATION: 74 MS
EKG QTC CALCULATION (BAZETT): 430 MS
EKG R AXIS: 76 DEGREES
EKG T AXIS: 92 DEGREES
EKG VENTRICULAR RATE: 77 BPM
EOSINOPHILS RELATIVE PERCENT: 4 % (ref 1–4)
EPITHELIAL CELLS UA: ABNORMAL /HPF (ref 0–5)
GFR AFRICAN AMERICAN: 52 ML/MIN
GFR NON-AFRICAN AMERICAN: 43 ML/MIN
GFR SERPL CREATININE-BSD FRML MDRD: ABNORMAL ML/MIN/{1.73_M2}
GFR SERPL CREATININE-BSD FRML MDRD: ABNORMAL ML/MIN/{1.73_M2}
GLUCOSE BLD-MCNC: 100 MG/DL (ref 70–99)
GLUCOSE URINE: NEGATIVE
HCT VFR BLD CALC: 23.9 % (ref 36–46)
HEMOCCULT SP1 STL QL: NEGATIVE
HEMOCCULT SP2 STL QL: NORMAL
HEMOCCULT SP3 STL QL: NORMAL
HEMOGLOBIN: 7.9 G/DL (ref 12–16)
IMMATURE GRANULOCYTES: ABNORMAL %
INR BLD: 1.1
KETONES, URINE: ABNORMAL
LACTIC ACID: 0.9 MMOL/L (ref 0.5–2.2)
LEUKOCYTE ESTERASE, URINE: ABNORMAL
LIPASE: 7 U/L (ref 13–60)
LYMPHOCYTES # BLD: 26 % (ref 24–44)
MCH RBC QN AUTO: 32.3 PG (ref 26–34)
MCHC RBC AUTO-ENTMCNC: 32.9 G/DL (ref 31–37)
MCV RBC AUTO: 98.3 FL (ref 80–100)
MONOCYTES # BLD: 14 % (ref 1–7)
MORPHOLOGY: ABNORMAL
MUCUS: ABNORMAL
NITRITE, URINE: NEGATIVE
NRBC AUTOMATED: ABNORMAL PER 100 WBC
NUCLEATED RED BLOOD CELLS: 2 PER 100 WBC
OTHER OBSERVATIONS UA: ABNORMAL
PARTIAL THROMBOPLASTIN TIME: 35.5 SEC (ref 23–31)
PDW BLD-RTO: 17.4 % (ref 11.5–14.5)
PH UA: 5.5 (ref 5–8)
PLATELET # BLD: 169 K/UL (ref 130–400)
PLATELET ESTIMATE: ABNORMAL
PMV BLD AUTO: 6.6 FL (ref 6–12)
POTASSIUM SERPL-SCNC: 4.2 MMOL/L (ref 3.7–5.3)
PRO-BNP: 6307 PG/ML
PROTEIN UA: NEGATIVE
PROTHROMBIN TIME: 11.5 SEC (ref 9.7–11.6)
RBC # BLD: 2.43 M/UL (ref 4–5.2)
RBC # BLD: ABNORMAL 10*6/UL
RBC UA: ABNORMAL /HPF (ref 0–2)
RENAL EPITHELIAL, UA: ABNORMAL /HPF
SEG NEUTROPHILS: 56 % (ref 36–66)
SEGMENTED NEUTROPHILS ABSOLUTE COUNT: 1.18 K/UL (ref 1.8–7.7)
SODIUM BLD-SCNC: 144 MMOL/L (ref 135–144)
SPECIFIC GRAVITY UA: 1.02 (ref 1–1.03)
TIME, STOOL #1: 1650
TIME, STOOL #2: NORMAL
TIME, STOOL #3: NORMAL
TOTAL PROTEIN: 5.4 G/DL (ref 6.4–8.3)
TRICHOMONAS: ABNORMAL
TROPONIN INTERP: ABNORMAL
TROPONIN T: 0.06 NG/ML
TURBIDITY: ABNORMAL
URINE HGB: NEGATIVE
UROBILINOGEN, URINE: NORMAL
WBC # BLD: 2.1 K/UL (ref 3.5–11)
WBC # BLD: ABNORMAL 10*3/UL
WBC UA: ABNORMAL /HPF (ref 0–5)
YEAST: ABNORMAL

## 2018-05-05 PROCEDURE — 2580000003 HC RX 258: Performed by: NURSE PRACTITIONER

## 2018-05-05 PROCEDURE — 94640 AIRWAY INHALATION TREATMENT: CPT

## 2018-05-05 PROCEDURE — 87040 BLOOD CULTURE FOR BACTERIA: CPT

## 2018-05-05 PROCEDURE — 87149 DNA/RNA DIRECT PROBE: CPT

## 2018-05-05 PROCEDURE — 2060000000 HC ICU INTERMEDIATE R&B

## 2018-05-05 PROCEDURE — 71045 X-RAY EXAM CHEST 1 VIEW: CPT

## 2018-05-05 PROCEDURE — 70450 CT HEAD/BRAIN W/O DYE: CPT

## 2018-05-05 PROCEDURE — 84484 ASSAY OF TROPONIN QUANT: CPT

## 2018-05-05 PROCEDURE — 81001 URINALYSIS AUTO W/SCOPE: CPT

## 2018-05-05 PROCEDURE — 83605 ASSAY OF LACTIC ACID: CPT

## 2018-05-05 PROCEDURE — 73502 X-RAY EXAM HIP UNI 2-3 VIEWS: CPT

## 2018-05-05 PROCEDURE — 82140 ASSAY OF AMMONIA: CPT

## 2018-05-05 PROCEDURE — 6360000002 HC RX W HCPCS: Performed by: INTERNAL MEDICINE

## 2018-05-05 PROCEDURE — 93005 ELECTROCARDIOGRAM TRACING: CPT

## 2018-05-05 PROCEDURE — 87205 SMEAR GRAM STAIN: CPT

## 2018-05-05 PROCEDURE — 82272 OCCULT BLD FECES 1-3 TESTS: CPT

## 2018-05-05 PROCEDURE — 99285 EMERGENCY DEPT VISIT HI MDM: CPT

## 2018-05-05 PROCEDURE — 83880 ASSAY OF NATRIURETIC PEPTIDE: CPT

## 2018-05-05 PROCEDURE — 80053 COMPREHEN METABOLIC PANEL: CPT

## 2018-05-05 PROCEDURE — 87086 URINE CULTURE/COLONY COUNT: CPT

## 2018-05-05 PROCEDURE — 6360000002 HC RX W HCPCS: Performed by: NURSE PRACTITIONER

## 2018-05-05 PROCEDURE — 2580000003 HC RX 258: Performed by: INTERNAL MEDICINE

## 2018-05-05 PROCEDURE — 85730 THROMBOPLASTIN TIME PARTIAL: CPT

## 2018-05-05 PROCEDURE — 85610 PROTHROMBIN TIME: CPT

## 2018-05-05 PROCEDURE — 83690 ASSAY OF LIPASE: CPT

## 2018-05-05 PROCEDURE — 6370000000 HC RX 637 (ALT 250 FOR IP): Performed by: INTERNAL MEDICINE

## 2018-05-05 PROCEDURE — 87077 CULTURE AEROBIC IDENTIFY: CPT

## 2018-05-05 PROCEDURE — 85025 COMPLETE CBC W/AUTO DIFF WBC: CPT

## 2018-05-05 RX ORDER — ACETAMINOPHEN 325 MG/1
650 TABLET ORAL EVERY 4 HOURS PRN
Status: DISCONTINUED | OUTPATIENT
Start: 2018-05-05 | End: 2018-05-08 | Stop reason: HOSPADM

## 2018-05-05 RX ORDER — ISOSORBIDE MONONITRATE 60 MG/1
60 TABLET, EXTENDED RELEASE ORAL DAILY
Status: DISCONTINUED | OUTPATIENT
Start: 2018-05-06 | End: 2018-05-08 | Stop reason: HOSPADM

## 2018-05-05 RX ORDER — PANTOPRAZOLE SODIUM 40 MG/1
40 TABLET, DELAYED RELEASE ORAL
Status: DISCONTINUED | OUTPATIENT
Start: 2018-05-06 | End: 2018-05-08 | Stop reason: HOSPADM

## 2018-05-05 RX ORDER — 0.9 % SODIUM CHLORIDE 0.9 %
250 INTRAVENOUS SOLUTION INTRAVENOUS ONCE
Status: COMPLETED | OUTPATIENT
Start: 2018-05-05 | End: 2018-05-05

## 2018-05-05 RX ORDER — LOPERAMIDE HYDROCHLORIDE 2 MG/1
4 CAPSULE ORAL 4 TIMES DAILY PRN
Status: ON HOLD | COMMUNITY
End: 2018-05-08 | Stop reason: HOSPADM

## 2018-05-05 RX ORDER — ASPIRIN 81 MG/1
81 TABLET, CHEWABLE ORAL DAILY
Status: DISCONTINUED | OUTPATIENT
Start: 2018-05-06 | End: 2018-05-08 | Stop reason: HOSPADM

## 2018-05-05 RX ORDER — PANTOPRAZOLE SODIUM 40 MG/1
40 TABLET, DELAYED RELEASE ORAL DAILY
COMMUNITY

## 2018-05-05 RX ORDER — SODIUM CHLORIDE 9 MG/ML
INJECTION, SOLUTION INTRAVENOUS CONTINUOUS
Status: DISCONTINUED | OUTPATIENT
Start: 2018-05-05 | End: 2018-05-08 | Stop reason: HOSPADM

## 2018-05-05 RX ORDER — DIVALPROEX SODIUM 125 MG/1
125 TABLET, DELAYED RELEASE ORAL 3 TIMES DAILY
Status: DISCONTINUED | OUTPATIENT
Start: 2018-05-05 | End: 2018-05-08 | Stop reason: HOSPADM

## 2018-05-05 RX ORDER — LEVOTHYROXINE SODIUM 0.03 MG/1
25 TABLET ORAL DAILY
Status: DISCONTINUED | OUTPATIENT
Start: 2018-05-06 | End: 2018-05-08 | Stop reason: HOSPADM

## 2018-05-05 RX ORDER — UBIDECARENONE 75 MG
500 CAPSULE ORAL DAILY
Status: DISCONTINUED | OUTPATIENT
Start: 2018-05-06 | End: 2018-05-08 | Stop reason: HOSPADM

## 2018-05-05 RX ORDER — 0.9 % SODIUM CHLORIDE 0.9 %
500 INTRAVENOUS SOLUTION INTRAVENOUS ONCE
Status: COMPLETED | OUTPATIENT
Start: 2018-05-05 | End: 2018-05-05

## 2018-05-05 RX ORDER — NITROGLYCERIN 0.4 MG/1
0.4 TABLET SUBLINGUAL EVERY 5 MIN PRN
Status: DISCONTINUED | OUTPATIENT
Start: 2018-05-05 | End: 2018-05-08 | Stop reason: HOSPADM

## 2018-05-05 RX ORDER — ALBUTEROL SULFATE 2.5 MG/3ML
2.5 SOLUTION RESPIRATORY (INHALATION)
Status: DISCONTINUED | OUTPATIENT
Start: 2018-05-05 | End: 2018-05-05

## 2018-05-05 RX ORDER — GABAPENTIN 300 MG/1
300 CAPSULE ORAL 2 TIMES DAILY
Status: DISCONTINUED | OUTPATIENT
Start: 2018-05-05 | End: 2018-05-08 | Stop reason: HOSPADM

## 2018-05-05 RX ORDER — ALBUTEROL SULFATE 2.5 MG/3ML
2.5 SOLUTION RESPIRATORY (INHALATION) 4 TIMES DAILY
Status: DISCONTINUED | OUTPATIENT
Start: 2018-05-06 | End: 2018-05-05

## 2018-05-05 RX ORDER — METHYLPREDNISOLONE SODIUM SUCCINATE 125 MG/2ML
125 INJECTION, POWDER, LYOPHILIZED, FOR SOLUTION INTRAMUSCULAR; INTRAVENOUS ONCE
Status: COMPLETED | OUTPATIENT
Start: 2018-05-05 | End: 2018-05-05

## 2018-05-05 RX ADMIN — ALBUTEROL SULFATE 2.5 MG: 5 SOLUTION RESPIRATORY (INHALATION) at 19:04

## 2018-05-05 RX ADMIN — DIVALPROEX SODIUM 125 MG: 125 TABLET, DELAYED RELEASE ORAL at 21:55

## 2018-05-05 RX ADMIN — CEFTRIAXONE SODIUM 1 G: 1 INJECTION, POWDER, FOR SOLUTION INTRAMUSCULAR; INTRAVENOUS at 22:00

## 2018-05-05 RX ADMIN — CARBIDOPA AND LEVODOPA 1 TABLET: 25; 100 TABLET ORAL at 21:55

## 2018-05-05 RX ADMIN — SODIUM CHLORIDE: 9 INJECTION, SOLUTION INTRAVENOUS at 22:01

## 2018-05-05 RX ADMIN — SODIUM CHLORIDE 500 ML: 0.9 INJECTION, SOLUTION INTRAVENOUS at 15:31

## 2018-05-05 RX ADMIN — GABAPENTIN 300 MG: 300 CAPSULE ORAL at 21:55

## 2018-05-05 RX ADMIN — METHYLPREDNISOLONE SODIUM SUCCINATE 125 MG: 125 INJECTION, POWDER, FOR SOLUTION INTRAMUSCULAR; INTRAVENOUS at 19:07

## 2018-05-05 RX ADMIN — SODIUM CHLORIDE 250 ML: 0.9 INJECTION, SOLUTION INTRAVENOUS at 16:48

## 2018-05-05 ASSESSMENT — PAIN SCALES - GENERAL
PAINLEVEL_OUTOF10: 0

## 2018-05-06 LAB
ABSOLUTE EOS #: 0 K/UL (ref 0–0.4)
ABSOLUTE IMMATURE GRANULOCYTE: ABNORMAL K/UL (ref 0–0.3)
ABSOLUTE LYMPH #: 0.2 K/UL (ref 1–4.8)
ABSOLUTE MONO #: 0.1 K/UL (ref 0.2–0.8)
ABSOLUTE RETIC #: 0.18 M/UL (ref 0.02–0.1)
ALBUMIN SERPL-MCNC: 3.5 G/DL (ref 3.5–5.2)
ALBUMIN/GLOBULIN RATIO: ABNORMAL (ref 1–2.5)
ALP BLD-CCNC: 57 U/L (ref 35–104)
ALT SERPL-CCNC: <5 U/L (ref 5–33)
AMMONIA: 54 UMOL/L (ref 11–51)
AST SERPL-CCNC: 10 U/L
BASOPHILS # BLD: 0 % (ref 0–2)
BASOPHILS ABSOLUTE: 0 K/UL (ref 0–0.2)
BILIRUB SERPL-MCNC: 0.31 MG/DL (ref 0.3–1.2)
BILIRUBIN DIRECT: 0.09 MG/DL
BILIRUBIN, INDIRECT: 0.22 MG/DL (ref 0–1)
CALCIUM IONIZED: 1.18 MMOL/L (ref 1.13–1.33)
DIFFERENTIAL TYPE: ABNORMAL
EOSINOPHILS RELATIVE PERCENT: 0 % (ref 1–4)
FERRITIN: 899 UG/L (ref 13–150)
FIO2: 32
FOLATE: 9.5 NG/ML
FREE KAPPA/LAMBDA RATIO: 1.45 (ref 0.26–1.65)
GLOBULIN: ABNORMAL G/DL (ref 1.5–3.8)
HCT VFR BLD CALC: 26.9 % (ref 36–46)
HEMOGLOBIN: 9 G/DL (ref 12–16)
IMMATURE GRANULOCYTES: ABNORMAL %
IMMATURE RETIC FRACT: ABNORMAL %
IRON SATURATION: 39 % (ref 20–55)
IRON: 97 UG/DL (ref 37–145)
KAPPA FREE LIGHT CHAINS QNT: 2.83 MG/DL (ref 0.37–1.94)
LAMBDA FREE LIGHT CHAINS QNT: 1.95 MG/DL (ref 0.57–2.63)
LYMPHOCYTES # BLD: 11 % (ref 24–44)
MAGNESIUM: 1.9 MG/DL (ref 1.6–2.6)
MCH RBC QN AUTO: 32.8 PG (ref 26–34)
MCHC RBC AUTO-ENTMCNC: 33.4 G/DL (ref 31–37)
MCV RBC AUTO: 98.1 FL (ref 80–100)
MONOCYTES # BLD: 3 % (ref 1–7)
NEGATIVE BASE EXCESS, ART: ABNORMAL (ref 0–2)
NRBC AUTOMATED: ABNORMAL PER 100 WBC
O2 DEVICE/FLOW/%: ABNORMAL
PATIENT TEMP: 37
PDW BLD-RTO: 18 % (ref 11.5–14.5)
PLATELET # BLD: 162 K/UL (ref 130–400)
PLATELET ESTIMATE: ABNORMAL
PMV BLD AUTO: 7.1 FL (ref 6–12)
POC HCO3: 37.4 MMOL/L (ref 22–27)
POC O2 SATURATION: 95 %
POC PCO2 TEMP: ABNORMAL MM HG
POC PCO2: 44 MM HG (ref 32–45)
POC PH TEMP: ABNORMAL
POC PH: 7.54 (ref 7.35–7.45)
POC PO2 TEMP: ABNORMAL MM HG
POC PO2: 67 MM HG (ref 75–95)
POSITIVE BASE EXCESS, ART: 15 (ref 0–2)
RBC # BLD: 2.75 M/UL (ref 4–5.2)
RBC # BLD: ABNORMAL 10*6/UL
RETIC %: 6.4 % (ref 0.5–2)
RETIC HEMOGLOBIN: ABNORMAL PG (ref 28.2–35.7)
SEG NEUTROPHILS: 86 % (ref 36–66)
SEGMENTED NEUTROPHILS ABSOLUTE COUNT: 1.8 K/UL (ref 1.8–7.7)
TCO2 (CALC), ART: 39 MMOL/L (ref 23–28)
THYROXINE, FREE: 0.87 NG/DL (ref 0.93–1.7)
TOTAL IRON BINDING CAPACITY: 246 UG/DL (ref 250–450)
TOTAL PROTEIN: 6.6 G/DL (ref 6.4–8.3)
TSH SERPL DL<=0.05 MIU/L-ACNC: 0.19 MIU/L (ref 0.3–5)
UNSATURATED IRON BINDING CAPACITY: 149 UG/DL (ref 112–347)
VITAMIN B-12: 1279 PG/ML (ref 232–1245)
WBC # BLD: 2 K/UL (ref 3.5–11)
WBC # BLD: ABNORMAL 10*3/UL

## 2018-05-06 PROCEDURE — 2700000000 HC OXYGEN THERAPY PER DAY

## 2018-05-06 PROCEDURE — 94760 N-INVAS EAR/PLS OXIMETRY 1: CPT

## 2018-05-06 PROCEDURE — 82140 ASSAY OF AMMONIA: CPT

## 2018-05-06 PROCEDURE — 83540 ASSAY OF IRON: CPT

## 2018-05-06 PROCEDURE — 82607 VITAMIN B-12: CPT

## 2018-05-06 PROCEDURE — 6360000002 HC RX W HCPCS: Performed by: INTERNAL MEDICINE

## 2018-05-06 PROCEDURE — 82746 ASSAY OF FOLIC ACID SERUM: CPT

## 2018-05-06 PROCEDURE — 36415 COLL VENOUS BLD VENIPUNCTURE: CPT

## 2018-05-06 PROCEDURE — 82330 ASSAY OF CALCIUM: CPT

## 2018-05-06 PROCEDURE — 88185 FLOWCYTOMETRY/TC ADD-ON: CPT

## 2018-05-06 PROCEDURE — 83735 ASSAY OF MAGNESIUM: CPT

## 2018-05-06 PROCEDURE — 82803 BLOOD GASES ANY COMBINATION: CPT

## 2018-05-06 PROCEDURE — 99223 1ST HOSP IP/OBS HIGH 75: CPT | Performed by: INTERNAL MEDICINE

## 2018-05-06 PROCEDURE — 88184 FLOWCYTOMETRY/ TC 1 MARKER: CPT

## 2018-05-06 PROCEDURE — 85025 COMPLETE CBC W/AUTO DIFF WBC: CPT

## 2018-05-06 PROCEDURE — 36600 WITHDRAWAL OF ARTERIAL BLOOD: CPT

## 2018-05-06 PROCEDURE — 6370000000 HC RX 637 (ALT 250 FOR IP): Performed by: INTERNAL MEDICINE

## 2018-05-06 PROCEDURE — 84443 ASSAY THYROID STIM HORMONE: CPT

## 2018-05-06 PROCEDURE — 84439 ASSAY OF FREE THYROXINE: CPT

## 2018-05-06 PROCEDURE — 84165 PROTEIN E-PHORESIS SERUM: CPT

## 2018-05-06 PROCEDURE — 82728 ASSAY OF FERRITIN: CPT

## 2018-05-06 PROCEDURE — 2060000000 HC ICU INTERMEDIATE R&B

## 2018-05-06 PROCEDURE — 80076 HEPATIC FUNCTION PANEL: CPT

## 2018-05-06 PROCEDURE — 84155 ASSAY OF PROTEIN SERUM: CPT

## 2018-05-06 PROCEDURE — 94640 AIRWAY INHALATION TREATMENT: CPT

## 2018-05-06 PROCEDURE — 85045 AUTOMATED RETICULOCYTE COUNT: CPT

## 2018-05-06 PROCEDURE — 83883 ASSAY NEPHELOMETRY NOT SPEC: CPT

## 2018-05-06 PROCEDURE — 83550 IRON BINDING TEST: CPT

## 2018-05-06 RX ORDER — ALPRAZOLAM 0.5 MG/1
0.5 TABLET ORAL 3 TIMES DAILY PRN
Status: DISCONTINUED | OUTPATIENT
Start: 2018-05-06 | End: 2018-05-08 | Stop reason: HOSPADM

## 2018-05-06 RX ORDER — BUDESONIDE 0.5 MG/2ML
0.5 INHALANT ORAL 2 TIMES DAILY
Status: DISCONTINUED | OUTPATIENT
Start: 2018-05-06 | End: 2018-05-08 | Stop reason: HOSPADM

## 2018-05-06 RX ORDER — CARVEDILOL 6.25 MG/1
6.25 TABLET ORAL 2 TIMES DAILY WITH MEALS
Status: DISCONTINUED | OUTPATIENT
Start: 2018-05-06 | End: 2018-05-08 | Stop reason: HOSPADM

## 2018-05-06 RX ORDER — AMLODIPINE BESYLATE 5 MG/1
5 TABLET ORAL DAILY
Status: DISCONTINUED | OUTPATIENT
Start: 2018-05-06 | End: 2018-05-08 | Stop reason: HOSPADM

## 2018-05-06 RX ADMIN — DIVALPROEX SODIUM 125 MG: 125 TABLET, DELAYED RELEASE ORAL at 09:14

## 2018-05-06 RX ADMIN — CARBIDOPA AND LEVODOPA 1 TABLET: 25; 100 TABLET ORAL at 21:17

## 2018-05-06 RX ADMIN — IPRATROPIUM BROMIDE 0.5 MG: 0.5 SOLUTION RESPIRATORY (INHALATION) at 19:47

## 2018-05-06 RX ADMIN — DIVALPROEX SODIUM 125 MG: 125 TABLET, DELAYED RELEASE ORAL at 13:25

## 2018-05-06 RX ADMIN — VITAMIN B12 0.1 MG ORAL TABLET 500 MCG: 0.1 TABLET ORAL at 09:14

## 2018-05-06 RX ADMIN — IPRATROPIUM BROMIDE 0.5 MG: 0.5 SOLUTION RESPIRATORY (INHALATION) at 11:45

## 2018-05-06 RX ADMIN — ISOSORBIDE MONONITRATE 60 MG: 60 TABLET ORAL at 09:14

## 2018-05-06 RX ADMIN — BUDESONIDE 500 MCG: 0.5 SUSPENSION RESPIRATORY (INHALATION) at 19:47

## 2018-05-06 RX ADMIN — CARVEDILOL 6.25 MG: 6.25 TABLET, FILM COATED ORAL at 16:27

## 2018-05-06 RX ADMIN — IPRATROPIUM BROMIDE 0.5 MG: 0.5 SOLUTION RESPIRATORY (INHALATION) at 08:08

## 2018-05-06 RX ADMIN — ALPRAZOLAM 0.5 MG: 0.5 TABLET ORAL at 21:23

## 2018-05-06 RX ADMIN — IPRATROPIUM BROMIDE 0.5 MG: 0.5 SOLUTION RESPIRATORY (INHALATION) at 15:38

## 2018-05-06 RX ADMIN — LEVOTHYROXINE SODIUM 25 MCG: 25 TABLET ORAL at 06:35

## 2018-05-06 RX ADMIN — CARBIDOPA AND LEVODOPA 1 TABLET: 25; 100 TABLET ORAL at 09:14

## 2018-05-06 RX ADMIN — CARBIDOPA AND LEVODOPA 1 TABLET: 25; 100 TABLET ORAL at 13:25

## 2018-05-06 RX ADMIN — PANTOPRAZOLE SODIUM 40 MG: 40 TABLET, DELAYED RELEASE ORAL at 06:35

## 2018-05-06 RX ADMIN — GABAPENTIN 300 MG: 300 CAPSULE ORAL at 21:17

## 2018-05-06 RX ADMIN — DIVALPROEX SODIUM 125 MG: 125 TABLET, DELAYED RELEASE ORAL at 21:17

## 2018-05-06 RX ADMIN — CARBIDOPA AND LEVODOPA 1 TABLET: 25; 100 TABLET ORAL at 16:28

## 2018-05-06 RX ADMIN — GABAPENTIN 300 MG: 300 CAPSULE ORAL at 09:14

## 2018-05-06 RX ADMIN — ASPIRIN 81 MG 81 MG: 81 TABLET ORAL at 09:14

## 2018-05-06 RX ADMIN — AMLODIPINE BESYLATE 5 MG: 5 TABLET ORAL at 16:27

## 2018-05-06 ASSESSMENT — PAIN SCALES - GENERAL
PAINLEVEL_OUTOF10: 0

## 2018-05-07 ENCOUNTER — APPOINTMENT (OUTPATIENT)
Dept: ULTRASOUND IMAGING | Age: 78
DRG: 189 | End: 2018-05-07
Payer: MEDICARE

## 2018-05-07 ENCOUNTER — APPOINTMENT (OUTPATIENT)
Dept: MRI IMAGING | Age: 78
DRG: 189 | End: 2018-05-07
Payer: MEDICARE

## 2018-05-07 LAB
ALBUMIN (CALCULATED): 4 G/DL (ref 3.2–5.2)
ALBUMIN PERCENT: 63 % (ref 45–65)
ALPHA 1 PERCENT: 3 % (ref 3–6)
ALPHA 2 PERCENT: 8 % (ref 6–13)
ALPHA-1-GLOBULIN: 0.2 G/DL (ref 0.1–0.4)
ALPHA-2-GLOBULIN: 0.5 G/DL (ref 0.5–0.9)
BETA GLOBULIN: 0.6 G/DL (ref 0.5–1.1)
BETA PERCENT: 10 % (ref 11–19)
CULTURE: ABNORMAL
GAMMA GLOBULIN %: 16 % (ref 9–20)
GAMMA GLOBULIN: 1 G/DL (ref 0.5–1.5)
Lab: ABNORMAL
PATHOLOGIST: ABNORMAL
PROTEIN ELECTROPHORESIS, SERUM: ABNORMAL
SPECIMEN DESCRIPTION: ABNORMAL
SPECIMEN DESCRIPTION: ABNORMAL
STATUS: ABNORMAL
SURGICAL PATHOLOGY REPORT: NORMAL
TOTAL PROT. SUM,%: 100 % (ref 98–102)
TOTAL PROT. SUM: 6.3 G/DL (ref 6.3–8.2)
TOTAL PROTEIN: 6.3 G/DL (ref 6.4–8.3)
VALPROIC ACID LEVEL: 37 UG/ML (ref 50–125)
VALPROIC DATE LAST DOSE: ABNORMAL
VALPROIC DOSE AMOUNT: ABNORMAL
VALPROIC TIME LAST DOSE: ABNORMAL

## 2018-05-07 PROCEDURE — 6360000002 HC RX W HCPCS: Performed by: INTERNAL MEDICINE

## 2018-05-07 PROCEDURE — 36415 COLL VENOUS BLD VENIPUNCTURE: CPT

## 2018-05-07 PROCEDURE — 2700000000 HC OXYGEN THERAPY PER DAY

## 2018-05-07 PROCEDURE — 70551 MRI BRAIN STEM W/O DYE: CPT

## 2018-05-07 PROCEDURE — 2580000003 HC RX 258: Performed by: INTERNAL MEDICINE

## 2018-05-07 PROCEDURE — 95816 EEG AWAKE AND DROWSY: CPT

## 2018-05-07 PROCEDURE — 94640 AIRWAY INHALATION TREATMENT: CPT

## 2018-05-07 PROCEDURE — 2060000000 HC ICU INTERMEDIATE R&B

## 2018-05-07 PROCEDURE — G8978 MOBILITY CURRENT STATUS: HCPCS

## 2018-05-07 PROCEDURE — 6370000000 HC RX 637 (ALT 250 FOR IP): Performed by: INTERNAL MEDICINE

## 2018-05-07 PROCEDURE — 99232 SBSQ HOSP IP/OBS MODERATE 35: CPT | Performed by: INTERNAL MEDICINE

## 2018-05-07 PROCEDURE — 97530 THERAPEUTIC ACTIVITIES: CPT

## 2018-05-07 PROCEDURE — 80164 ASSAY DIPROPYLACETIC ACD TOT: CPT

## 2018-05-07 PROCEDURE — G8979 MOBILITY GOAL STATUS: HCPCS

## 2018-05-07 PROCEDURE — 97161 PT EVAL LOW COMPLEX 20 MIN: CPT

## 2018-05-07 PROCEDURE — 76705 ECHO EXAM OF ABDOMEN: CPT

## 2018-05-07 RX ADMIN — CARBIDOPA AND LEVODOPA 1 TABLET: 25; 100 TABLET ORAL at 17:32

## 2018-05-07 RX ADMIN — IPRATROPIUM BROMIDE 0.5 MG: 0.5 SOLUTION RESPIRATORY (INHALATION) at 19:16

## 2018-05-07 RX ADMIN — DIVALPROEX SODIUM 125 MG: 125 TABLET, DELAYED RELEASE ORAL at 09:06

## 2018-05-07 RX ADMIN — DIVALPROEX SODIUM 125 MG: 125 TABLET, DELAYED RELEASE ORAL at 13:10

## 2018-05-07 RX ADMIN — IPRATROPIUM BROMIDE 0.5 MG: 0.5 SOLUTION RESPIRATORY (INHALATION) at 11:30

## 2018-05-07 RX ADMIN — DIVALPROEX SODIUM 125 MG: 125 TABLET, DELAYED RELEASE ORAL at 21:40

## 2018-05-07 RX ADMIN — VITAMIN B12 0.1 MG ORAL TABLET 500 MCG: 0.1 TABLET ORAL at 09:06

## 2018-05-07 RX ADMIN — CEFTRIAXONE SODIUM 1 G: 1 INJECTION, POWDER, FOR SOLUTION INTRAMUSCULAR; INTRAVENOUS at 21:40

## 2018-05-07 RX ADMIN — BUDESONIDE 500 MCG: 0.5 SUSPENSION RESPIRATORY (INHALATION) at 19:16

## 2018-05-07 RX ADMIN — PANTOPRAZOLE SODIUM 40 MG: 40 TABLET, DELAYED RELEASE ORAL at 06:17

## 2018-05-07 RX ADMIN — CARVEDILOL 6.25 MG: 6.25 TABLET, FILM COATED ORAL at 17:32

## 2018-05-07 RX ADMIN — ASPIRIN 81 MG 81 MG: 81 TABLET ORAL at 09:06

## 2018-05-07 RX ADMIN — CARBIDOPA AND LEVODOPA 1 TABLET: 25; 100 TABLET ORAL at 09:06

## 2018-05-07 RX ADMIN — LEVOTHYROXINE SODIUM 25 MCG: 25 TABLET ORAL at 06:17

## 2018-05-07 RX ADMIN — CARBIDOPA AND LEVODOPA 1 TABLET: 25; 100 TABLET ORAL at 21:41

## 2018-05-07 RX ADMIN — GABAPENTIN 300 MG: 300 CAPSULE ORAL at 09:06

## 2018-05-07 RX ADMIN — IPRATROPIUM BROMIDE 0.5 MG: 0.5 SOLUTION RESPIRATORY (INHALATION) at 15:25

## 2018-05-07 RX ADMIN — ISOSORBIDE MONONITRATE 60 MG: 60 TABLET ORAL at 09:06

## 2018-05-07 RX ADMIN — IPRATROPIUM BROMIDE 0.5 MG: 0.5 SOLUTION RESPIRATORY (INHALATION) at 07:15

## 2018-05-07 RX ADMIN — AMLODIPINE BESYLATE 5 MG: 5 TABLET ORAL at 09:06

## 2018-05-07 RX ADMIN — BUDESONIDE 500 MCG: 0.5 SUSPENSION RESPIRATORY (INHALATION) at 07:15

## 2018-05-07 RX ADMIN — CARVEDILOL 6.25 MG: 6.25 TABLET, FILM COATED ORAL at 09:06

## 2018-05-07 RX ADMIN — CARBIDOPA AND LEVODOPA 1 TABLET: 25; 100 TABLET ORAL at 13:10

## 2018-05-07 RX ADMIN — ALPRAZOLAM 0.5 MG: 0.5 TABLET ORAL at 21:41

## 2018-05-07 RX ADMIN — GABAPENTIN 300 MG: 300 CAPSULE ORAL at 21:40

## 2018-05-07 ASSESSMENT — PAIN SCALES - GENERAL
PAINLEVEL_OUTOF10: 0

## 2018-05-08 VITALS
RESPIRATION RATE: 18 BRPM | DIASTOLIC BLOOD PRESSURE: 58 MMHG | BODY MASS INDEX: 35.28 KG/M2 | SYSTOLIC BLOOD PRESSURE: 160 MMHG | WEIGHT: 175 LBS | HEIGHT: 59 IN | OXYGEN SATURATION: 96 % | TEMPERATURE: 98.4 F | HEART RATE: 78 BPM

## 2018-05-08 LAB
CULTURE: ABNORMAL
FLOW CYTOMETRY BL: NORMAL
Lab: ABNORMAL
SPECIMEN DESCRIPTION: ABNORMAL
SPECIMEN DESCRIPTION: ABNORMAL
STATUS: ABNORMAL

## 2018-05-08 PROCEDURE — 4A00X4Z MEASUREMENT OF CENTRAL NERVOUS ELECTRICAL ACTIVITY, EXTERNAL APPROACH: ICD-10-PCS | Performed by: PSYCHIATRY & NEUROLOGY

## 2018-05-08 PROCEDURE — 97535 SELF CARE MNGMENT TRAINING: CPT

## 2018-05-08 PROCEDURE — 99239 HOSP IP/OBS DSCHRG MGMT >30: CPT | Performed by: INTERNAL MEDICINE

## 2018-05-08 PROCEDURE — 6370000000 HC RX 637 (ALT 250 FOR IP): Performed by: INTERNAL MEDICINE

## 2018-05-08 PROCEDURE — 97166 OT EVAL MOD COMPLEX 45 MIN: CPT

## 2018-05-08 PROCEDURE — G8987 SELF CARE CURRENT STATUS: HCPCS

## 2018-05-08 PROCEDURE — 2700000000 HC OXYGEN THERAPY PER DAY

## 2018-05-08 PROCEDURE — 94760 N-INVAS EAR/PLS OXIMETRY 1: CPT

## 2018-05-08 PROCEDURE — 99232 SBSQ HOSP IP/OBS MODERATE 35: CPT | Performed by: INTERNAL MEDICINE

## 2018-05-08 PROCEDURE — 94640 AIRWAY INHALATION TREATMENT: CPT

## 2018-05-08 PROCEDURE — G8988 SELF CARE GOAL STATUS: HCPCS

## 2018-05-08 PROCEDURE — 6360000002 HC RX W HCPCS: Performed by: INTERNAL MEDICINE

## 2018-05-08 RX ORDER — ALPRAZOLAM 0.5 MG/1
0.25 TABLET ORAL 3 TIMES DAILY PRN
Qty: 30 TABLET | Refills: 0 | Status: SHIPPED | OUTPATIENT
Start: 2018-05-08 | End: 2018-05-18

## 2018-05-08 RX ORDER — LEVALBUTEROL 1.25 MG/.5ML
1.25 SOLUTION, CONCENTRATE RESPIRATORY (INHALATION) EVERY 4 HOURS PRN
Status: DISCONTINUED | OUTPATIENT
Start: 2018-05-08 | End: 2018-05-08 | Stop reason: HOSPADM

## 2018-05-08 RX ORDER — HYDROCODONE BITARTRATE AND ACETAMINOPHEN 5; 325 MG/1; MG/1
1 TABLET ORAL EVERY 4 HOURS PRN
Status: DISCONTINUED | OUTPATIENT
Start: 2018-05-08 | End: 2018-05-08 | Stop reason: HOSPADM

## 2018-05-08 RX ADMIN — ISOSORBIDE MONONITRATE 60 MG: 60 TABLET ORAL at 09:01

## 2018-05-08 RX ADMIN — AMLODIPINE BESYLATE 5 MG: 5 TABLET ORAL at 09:02

## 2018-05-08 RX ADMIN — CARBIDOPA AND LEVODOPA 1 TABLET: 25; 100 TABLET ORAL at 14:33

## 2018-05-08 RX ADMIN — ALPRAZOLAM 0.5 MG: 0.5 TABLET ORAL at 09:09

## 2018-05-08 RX ADMIN — HYDROCODONE BITARTRATE AND ACETAMINOPHEN 1 TABLET: 5; 325 TABLET ORAL at 00:18

## 2018-05-08 RX ADMIN — IPRATROPIUM BROMIDE 0.5 MG: 0.5 SOLUTION RESPIRATORY (INHALATION) at 15:44

## 2018-05-08 RX ADMIN — IPRATROPIUM BROMIDE 0.5 MG: 0.5 SOLUTION RESPIRATORY (INHALATION) at 07:34

## 2018-05-08 RX ADMIN — VITAMIN B12 0.1 MG ORAL TABLET 500 MCG: 0.1 TABLET ORAL at 09:01

## 2018-05-08 RX ADMIN — CARVEDILOL 6.25 MG: 6.25 TABLET, FILM COATED ORAL at 09:02

## 2018-05-08 RX ADMIN — ASPIRIN 81 MG 81 MG: 81 TABLET ORAL at 09:02

## 2018-05-08 RX ADMIN — PANTOPRAZOLE SODIUM 40 MG: 40 TABLET, DELAYED RELEASE ORAL at 07:00

## 2018-05-08 RX ADMIN — GABAPENTIN 300 MG: 300 CAPSULE ORAL at 09:02

## 2018-05-08 RX ADMIN — DIVALPROEX SODIUM 125 MG: 125 TABLET, DELAYED RELEASE ORAL at 14:33

## 2018-05-08 RX ADMIN — DIVALPROEX SODIUM 125 MG: 125 TABLET, DELAYED RELEASE ORAL at 09:02

## 2018-05-08 RX ADMIN — LEVOTHYROXINE SODIUM 25 MCG: 25 TABLET ORAL at 07:00

## 2018-05-08 RX ADMIN — Medication 500 UNITS: at 16:19

## 2018-05-08 RX ADMIN — IPRATROPIUM BROMIDE 0.5 MG: 0.5 SOLUTION RESPIRATORY (INHALATION) at 11:42

## 2018-05-08 RX ADMIN — CARVEDILOL 6.25 MG: 6.25 TABLET, FILM COATED ORAL at 16:26

## 2018-05-08 RX ADMIN — BUDESONIDE 500 MCG: 0.5 SUSPENSION RESPIRATORY (INHALATION) at 07:34

## 2018-05-08 RX ADMIN — CARBIDOPA AND LEVODOPA 1 TABLET: 25; 100 TABLET ORAL at 09:02

## 2018-05-08 RX ADMIN — CARBIDOPA AND LEVODOPA 1 TABLET: 25; 100 TABLET ORAL at 16:26

## 2018-05-08 ASSESSMENT — PAIN SCALES - GENERAL
PAINLEVEL_OUTOF10: 5
PAINLEVEL_OUTOF10: 5

## 2018-05-08 ASSESSMENT — PAIN DESCRIPTION - LOCATION: LOCATION: HEAD

## 2018-05-08 ASSESSMENT — PAIN DESCRIPTION - PAIN TYPE: TYPE: ACUTE PAIN

## 2018-05-11 LAB
CULTURE: NORMAL
CULTURE: NORMAL
Lab: NORMAL
SPECIMEN DESCRIPTION: NORMAL
SPECIMEN DESCRIPTION: NORMAL
STATUS: NORMAL

## 2018-05-18 ENCOUNTER — HOSPITAL ENCOUNTER (OUTPATIENT)
Age: 78
Setting detail: SPECIMEN
Discharge: HOME | End: 2018-05-18
Payer: MEDICARE

## 2018-05-18 LAB
ANION GAP SERPL CALCULATED.3IONS-SCNC: 9 MMOL/L (ref 9–17)
BUN BLDV-MCNC: 12 MG/DL (ref 8–23)
BUN/CREAT BLD: ABNORMAL (ref 9–20)
CALCIUM SERPL-MCNC: 8 MG/DL (ref 8.6–10.4)
CHLORIDE BLD-SCNC: 104 MMOL/L (ref 98–107)
CO2: 35 MMOL/L (ref 20–31)
CREAT SERPL-MCNC: 0.69 MG/DL (ref 0.5–0.9)
GFR AFRICAN AMERICAN: >60 ML/MIN
GFR NON-AFRICAN AMERICAN: >60 ML/MIN
GFR SERPL CREATININE-BSD FRML MDRD: ABNORMAL ML/MIN/{1.73_M2}
GFR SERPL CREATININE-BSD FRML MDRD: ABNORMAL ML/MIN/{1.73_M2}
GLUCOSE BLD-MCNC: 67 MG/DL (ref 70–99)
HCT VFR BLD CALC: 28.8 % (ref 36.3–47.1)
HEMOGLOBIN: 8.5 G/DL (ref 11.9–15.1)
MCH RBC QN AUTO: 32.1 PG (ref 25.2–33.5)
MCHC RBC AUTO-ENTMCNC: 29.5 G/DL (ref 28.4–34.8)
MCV RBC AUTO: 108.7 FL (ref 82.6–102.9)
NRBC AUTOMATED: 0 PER 100 WBC
PDW BLD-RTO: 17.2 % (ref 11.8–14.4)
PLATELET # BLD: 124 K/UL (ref 138–453)
PMV BLD AUTO: 11 FL (ref 8.1–13.5)
POTASSIUM SERPL-SCNC: 4.1 MMOL/L (ref 3.7–5.3)
RBC # BLD: 2.65 M/UL (ref 3.95–5.11)
SODIUM BLD-SCNC: 148 MMOL/L (ref 135–144)
WBC # BLD: 1.7 K/UL (ref 3.5–11.3)

## 2018-05-18 PROCEDURE — 80048 BASIC METABOLIC PNL TOTAL CA: CPT

## 2018-05-18 PROCEDURE — P9603 ONE-WAY ALLOW PRORATED MILES: HCPCS

## 2018-05-18 PROCEDURE — 85027 COMPLETE CBC AUTOMATED: CPT

## 2018-05-18 PROCEDURE — 36415 COLL VENOUS BLD VENIPUNCTURE: CPT

## 2018-05-25 ENCOUNTER — HOSPITAL ENCOUNTER (OUTPATIENT)
Age: 78
Setting detail: SPECIMEN
Discharge: HOME | End: 2018-05-25
Payer: COMMERCIAL

## 2018-05-25 LAB
ANION GAP SERPL CALCULATED.3IONS-SCNC: 10 MMOL/L (ref 9–17)
BUN BLDV-MCNC: 16 MG/DL (ref 8–23)
BUN/CREAT BLD: ABNORMAL (ref 9–20)
CALCIUM SERPL-MCNC: 8.3 MG/DL (ref 8.6–10.4)
CHLORIDE BLD-SCNC: 104 MMOL/L (ref 98–107)
CO2: 32 MMOL/L (ref 20–31)
CREAT SERPL-MCNC: 0.66 MG/DL (ref 0.5–0.9)
GFR AFRICAN AMERICAN: >60 ML/MIN
GFR NON-AFRICAN AMERICAN: >60 ML/MIN
GFR SERPL CREATININE-BSD FRML MDRD: ABNORMAL ML/MIN/{1.73_M2}
GFR SERPL CREATININE-BSD FRML MDRD: ABNORMAL ML/MIN/{1.73_M2}
GLUCOSE BLD-MCNC: 78 MG/DL (ref 70–99)
HCT VFR BLD CALC: 32.8 % (ref 36.3–47.1)
HEMOGLOBIN: 9.1 G/DL (ref 11.9–15.1)
MCH RBC QN AUTO: 31.8 PG (ref 25.2–33.5)
MCHC RBC AUTO-ENTMCNC: 27.7 G/DL (ref 28.4–34.8)
MCV RBC AUTO: 114.7 FL (ref 82.6–102.9)
NRBC AUTOMATED: 0 PER 100 WBC
PDW BLD-RTO: 15.8 % (ref 11.8–14.4)
PLATELET # BLD: ABNORMAL K/UL (ref 138–453)
PLATELET, FLUORESCENCE: NORMAL K/UL (ref 138–453)
PLATELET, IMMATURE FRACTION: NORMAL % (ref 1.1–10.3)
PMV BLD AUTO: ABNORMAL FL (ref 8.1–13.5)
POTASSIUM SERPL-SCNC: 4.2 MMOL/L (ref 3.7–5.3)
RBC # BLD: 2.86 M/UL (ref 3.95–5.11)
SODIUM BLD-SCNC: 146 MMOL/L (ref 135–144)
WBC # BLD: 1.1 K/UL (ref 3.5–11.3)

## 2018-05-25 PROCEDURE — 36415 COLL VENOUS BLD VENIPUNCTURE: CPT

## 2018-05-25 PROCEDURE — 85055 RETICULATED PLATELET ASSAY: CPT

## 2018-05-25 PROCEDURE — 80048 BASIC METABOLIC PNL TOTAL CA: CPT

## 2018-05-25 PROCEDURE — 85027 COMPLETE CBC AUTOMATED: CPT

## 2018-05-25 PROCEDURE — P9603 ONE-WAY ALLOW PRORATED MILES: HCPCS

## 2018-05-29 ENCOUNTER — HOSPITAL ENCOUNTER (OUTPATIENT)
Age: 78
Setting detail: SPECIMEN
Discharge: HOME | End: 2018-05-29
Payer: COMMERCIAL

## 2018-05-29 LAB
ANION GAP SERPL CALCULATED.3IONS-SCNC: 11 MMOL/L (ref 9–17)
BNP INTERPRETATION: ABNORMAL
BUN BLDV-MCNC: 18 MG/DL (ref 8–23)
BUN/CREAT BLD: 20 (ref 9–20)
CALCIUM SERPL-MCNC: 8.3 MG/DL (ref 8.6–10.4)
CHLORIDE BLD-SCNC: 105 MMOL/L (ref 98–107)
CO2: 29 MMOL/L (ref 20–31)
CREAT SERPL-MCNC: 0.9 MG/DL (ref 0.5–0.9)
GFR AFRICAN AMERICAN: >60 ML/MIN
GFR NON-AFRICAN AMERICAN: >60 ML/MIN
GFR SERPL CREATININE-BSD FRML MDRD: ABNORMAL ML/MIN/{1.73_M2}
GFR SERPL CREATININE-BSD FRML MDRD: ABNORMAL ML/MIN/{1.73_M2}
GLUCOSE BLD-MCNC: 94 MG/DL (ref 70–99)
HCT VFR BLD CALC: 28.8 % (ref 36–46)
HEMOGLOBIN: 9.5 G/DL (ref 12–16)
MCH RBC QN AUTO: 33.4 PG (ref 26–34)
MCHC RBC AUTO-ENTMCNC: 33 G/DL (ref 31–37)
MCV RBC AUTO: 101.1 FL (ref 80–100)
NRBC AUTOMATED: ABNORMAL PER 100 WBC
PDW BLD-RTO: 15.7 % (ref 11.5–14.5)
PLATELET # BLD: 125 K/UL (ref 130–400)
PMV BLD AUTO: ABNORMAL FL (ref 6–12)
POTASSIUM SERPL-SCNC: 4.3 MMOL/L (ref 3.7–5.3)
PRO-BNP: 2152 PG/ML
RBC # BLD: 2.85 M/UL (ref 4–5.2)
SODIUM BLD-SCNC: 145 MMOL/L (ref 135–144)
VALPROIC ACID LEVEL: 43 UG/ML (ref 50–125)
VALPROIC DATE LAST DOSE: ABNORMAL
VALPROIC DOSE AMOUNT: ABNORMAL
VALPROIC TIME LAST DOSE: ABNORMAL
WBC # BLD: 2.3 K/UL (ref 3.5–11)

## 2018-05-29 PROCEDURE — 80164 ASSAY DIPROPYLACETIC ACD TOT: CPT

## 2018-05-29 PROCEDURE — P9603 ONE-WAY ALLOW PRORATED MILES: HCPCS

## 2018-05-29 PROCEDURE — 36415 COLL VENOUS BLD VENIPUNCTURE: CPT

## 2018-05-29 PROCEDURE — 83880 ASSAY OF NATRIURETIC PEPTIDE: CPT

## 2018-05-29 PROCEDURE — 80048 BASIC METABOLIC PNL TOTAL CA: CPT

## 2018-05-29 PROCEDURE — 85027 COMPLETE CBC AUTOMATED: CPT

## 2018-06-06 ENCOUNTER — HOSPITAL ENCOUNTER (OUTPATIENT)
Age: 78
Setting detail: SPECIMEN
Discharge: HOME | End: 2018-06-06
Payer: MEDICARE

## 2018-06-07 ENCOUNTER — HOSPITAL ENCOUNTER (OUTPATIENT)
Age: 78
Setting detail: SPECIMEN
Discharge: HOME | End: 2018-06-07
Payer: COMMERCIAL

## 2018-06-07 LAB
THYROXINE, FREE: 0.99 NG/DL (ref 0.93–1.7)
TSH SERPL DL<=0.05 MIU/L-ACNC: 1.66 MIU/L (ref 0.3–5)
VALPROIC ACID LEVEL: 36 UG/ML (ref 50–125)
VALPROIC DATE LAST DOSE: ABNORMAL
VALPROIC DOSE AMOUNT: ABNORMAL
VALPROIC TIME LAST DOSE: ABNORMAL
VITAMIN B-12: 1337 PG/ML (ref 232–1245)

## 2018-06-07 PROCEDURE — 84443 ASSAY THYROID STIM HORMONE: CPT

## 2018-06-07 PROCEDURE — 82607 VITAMIN B-12: CPT

## 2018-06-07 PROCEDURE — 80164 ASSAY DIPROPYLACETIC ACD TOT: CPT

## 2018-06-07 PROCEDURE — P9603 ONE-WAY ALLOW PRORATED MILES: HCPCS

## 2018-06-07 PROCEDURE — 84439 ASSAY OF FREE THYROXINE: CPT

## 2018-06-07 PROCEDURE — 36415 COLL VENOUS BLD VENIPUNCTURE: CPT

## 2018-06-22 ENCOUNTER — HOSPITAL ENCOUNTER (OUTPATIENT)
Age: 78
Setting detail: SPECIMEN
Discharge: HOME | End: 2018-06-22
Payer: MEDICARE

## 2018-06-22 LAB
-: NORMAL
ALBUMIN SERPL-MCNC: 3 G/DL (ref 3.5–5.2)
ALBUMIN/GLOBULIN RATIO: 1.4 (ref 1–2.5)
ALP BLD-CCNC: 44 U/L (ref 35–104)
ALT SERPL-CCNC: <5 U/L (ref 5–33)
ANION GAP SERPL CALCULATED.3IONS-SCNC: 9 MMOL/L (ref 9–17)
AST SERPL-CCNC: 10 U/L
BILIRUB SERPL-MCNC: 0.35 MG/DL (ref 0.3–1.2)
BUN BLDV-MCNC: 11 MG/DL (ref 8–23)
BUN/CREAT BLD: ABNORMAL (ref 9–20)
CALCIUM SERPL-MCNC: 8.2 MG/DL (ref 8.6–10.4)
CHLORIDE BLD-SCNC: 103 MMOL/L (ref 98–107)
CO2: 34 MMOL/L (ref 20–31)
CREAT SERPL-MCNC: 0.63 MG/DL (ref 0.5–0.9)
FERRITIN: 304 UG/L (ref 13–150)
GFR AFRICAN AMERICAN: >60 ML/MIN
GFR NON-AFRICAN AMERICAN: >60 ML/MIN
GFR SERPL CREATININE-BSD FRML MDRD: ABNORMAL ML/MIN/{1.73_M2}
GFR SERPL CREATININE-BSD FRML MDRD: ABNORMAL ML/MIN/{1.73_M2}
GLUCOSE BLD-MCNC: 60 MG/DL (ref 70–99)
INR BLD: 1.4
PARTIAL THROMBOPLASTIN TIME: 25.2 SEC (ref 23–31)
POTASSIUM SERPL-SCNC: 3.3 MMOL/L (ref 3.7–5.3)
PROTHROMBIN TIME: 14.3 SEC (ref 9.7–11.6)
REASON FOR REJECTION: NORMAL
SODIUM BLD-SCNC: 146 MMOL/L (ref 135–144)
THYROXINE, FREE: 1.01 NG/DL (ref 0.93–1.7)
TOTAL PROTEIN: 5.2 G/DL (ref 6.4–8.3)
TSH SERPL DL<=0.05 MIU/L-ACNC: 0.8 MIU/L (ref 0.3–5)
ZZ NTE CLEAN UP: ORDERED TEST: NORMAL
ZZ NTE WITH NAME CLEAN UP: SPECIMEN SOURCE: NORMAL

## 2018-06-22 PROCEDURE — 85730 THROMBOPLASTIN TIME PARTIAL: CPT

## 2018-06-22 PROCEDURE — 84439 ASSAY OF FREE THYROXINE: CPT

## 2018-06-22 PROCEDURE — 36415 COLL VENOUS BLD VENIPUNCTURE: CPT

## 2018-06-22 PROCEDURE — 85610 PROTHROMBIN TIME: CPT

## 2018-06-22 PROCEDURE — 82728 ASSAY OF FERRITIN: CPT

## 2018-06-22 PROCEDURE — P9603 ONE-WAY ALLOW PRORATED MILES: HCPCS

## 2018-06-22 PROCEDURE — 80053 COMPREHEN METABOLIC PANEL: CPT

## 2018-06-22 PROCEDURE — 84443 ASSAY THYROID STIM HORMONE: CPT

## 2018-06-25 ENCOUNTER — HOSPITAL ENCOUNTER (OUTPATIENT)
Age: 78
Setting detail: SPECIMEN
Discharge: HOME | End: 2018-06-25
Payer: MEDICARE

## 2018-06-25 LAB
ABSOLUTE EOS #: 0.14 K/UL (ref 0–0.4)
ABSOLUTE IMMATURE GRANULOCYTE: ABNORMAL K/UL (ref 0–0.3)
ABSOLUTE LYMPH #: 0.65 K/UL (ref 1–4.8)
ABSOLUTE MONO #: 0.23 K/UL (ref 0.2–0.8)
BASOPHILS # BLD: 1 %
BASOPHILS ABSOLUTE: 0.02 K/UL (ref 0–0.2)
DIFFERENTIAL TYPE: ABNORMAL
EOSINOPHILS RELATIVE PERCENT: 8 % (ref 1–4)
HCT VFR BLD CALC: 31.5 % (ref 36–46)
HEMOGLOBIN: 10.5 G/DL (ref 12–16)
IMMATURE GRANULOCYTES: ABNORMAL %
LYMPHOCYTES # BLD: 36 % (ref 24–44)
MCH RBC QN AUTO: 32.1 PG (ref 26–34)
MCHC RBC AUTO-ENTMCNC: 33.4 G/DL (ref 31–37)
MCV RBC AUTO: 95.9 FL (ref 80–100)
MONOCYTES # BLD: 13 % (ref 1–7)
NRBC AUTOMATED: ABNORMAL PER 100 WBC
PDW BLD-RTO: 13.7 % (ref 11.5–14.5)
PLATELET # BLD: 109 K/UL (ref 130–400)
PLATELET ESTIMATE: ABNORMAL
PMV BLD AUTO: 8.4 FL (ref 6–12)
RBC # BLD: 3.29 M/UL (ref 4–5.2)
RBC # BLD: ABNORMAL 10*6/UL
SEG NEUTROPHILS: 42 % (ref 36–66)
SEGMENTED NEUTROPHILS ABSOLUTE COUNT: 0.76 K/UL (ref 1.8–7.7)
WBC # BLD: 1.8 K/UL (ref 3.5–11)
WBC # BLD: ABNORMAL 10*3/UL

## 2018-06-25 PROCEDURE — 36415 COLL VENOUS BLD VENIPUNCTURE: CPT

## 2018-06-25 PROCEDURE — 85025 COMPLETE CBC W/AUTO DIFF WBC: CPT

## 2018-06-25 PROCEDURE — P9603 ONE-WAY ALLOW PRORATED MILES: HCPCS

## 2018-06-27 RX ORDER — SODIUM CHLORIDE 9 MG/ML
INJECTION, SOLUTION INTRAVENOUS CONTINUOUS
Status: CANCELLED | OUTPATIENT
Start: 2018-06-27 | End: 2019-06-27

## 2018-08-17 ENCOUNTER — HOSPITAL ENCOUNTER (OUTPATIENT)
Age: 78
Setting detail: SPECIMEN
Discharge: HOME | End: 2018-08-17
Payer: MEDICARE

## 2018-08-17 LAB
ABSOLUTE EOS #: 0.12 K/UL (ref 0–0.44)
ABSOLUTE IMMATURE GRANULOCYTE: <0.03 K/UL (ref 0–0.3)
ABSOLUTE LYMPH #: 0.96 K/UL (ref 1.1–3.7)
ABSOLUTE MONO #: 0.32 K/UL (ref 0.1–1.2)
ALBUMIN SERPL-MCNC: 3.1 G/DL (ref 3.5–5.2)
ALBUMIN/GLOBULIN RATIO: 1.1 (ref 1–2.5)
ALP BLD-CCNC: 44 U/L (ref 35–104)
ALT SERPL-CCNC: <5 U/L (ref 5–33)
ANION GAP SERPL CALCULATED.3IONS-SCNC: 10 MMOL/L (ref 9–17)
AST SERPL-CCNC: 16 U/L
BASOPHILS # BLD: 1 % (ref 0–2)
BASOPHILS ABSOLUTE: <0.03 K/UL (ref 0–0.2)
BILIRUB SERPL-MCNC: 0.23 MG/DL (ref 0.3–1.2)
BUN BLDV-MCNC: 22 MG/DL (ref 8–23)
BUN/CREAT BLD: ABNORMAL (ref 9–20)
CALCIUM SERPL-MCNC: 8.3 MG/DL (ref 8.6–10.4)
CHLORIDE BLD-SCNC: 99 MMOL/L (ref 98–107)
CO2: 33 MMOL/L (ref 20–31)
CREAT SERPL-MCNC: 0.81 MG/DL (ref 0.5–0.9)
DIFFERENTIAL TYPE: ABNORMAL
EOSINOPHILS RELATIVE PERCENT: 5 % (ref 1–4)
GFR AFRICAN AMERICAN: >60 ML/MIN
GFR NON-AFRICAN AMERICAN: >60 ML/MIN
GFR SERPL CREATININE-BSD FRML MDRD: ABNORMAL ML/MIN/{1.73_M2}
GFR SERPL CREATININE-BSD FRML MDRD: ABNORMAL ML/MIN/{1.73_M2}
GLUCOSE BLD-MCNC: 63 MG/DL (ref 70–99)
HCT VFR BLD CALC: 31.9 % (ref 36.3–47.1)
HEMOGLOBIN: 9.7 G/DL (ref 11.9–15.1)
IMMATURE GRANULOCYTES: 1 %
INR BLD: 1
LYMPHOCYTES # BLD: 36 % (ref 24–43)
MCH RBC QN AUTO: 31.9 PG (ref 25.2–33.5)
MCHC RBC AUTO-ENTMCNC: 30.4 G/DL (ref 28.4–34.8)
MCV RBC AUTO: 104.9 FL (ref 82.6–102.9)
MONOCYTES # BLD: 12 % (ref 3–12)
NRBC AUTOMATED: 0 PER 100 WBC
PARTIAL THROMBOPLASTIN TIME: 27.4 SEC (ref 23–31)
PDW BLD-RTO: 17.2 % (ref 11.8–14.4)
PLATELET # BLD: 114 K/UL (ref 138–453)
PLATELET ESTIMATE: ABNORMAL
PMV BLD AUTO: 10.7 FL (ref 8.1–13.5)
POTASSIUM SERPL-SCNC: 4.2 MMOL/L (ref 3.7–5.3)
PROTHROMBIN TIME: 10.2 SEC (ref 9.7–11.6)
RBC # BLD: 3.04 M/UL (ref 3.95–5.11)
RBC # BLD: ABNORMAL 10*6/UL
SEG NEUTROPHILS: 45 % (ref 36–65)
SEGMENTED NEUTROPHILS ABSOLUTE COUNT: 1.2 K/UL (ref 1.5–8.1)
SODIUM BLD-SCNC: 142 MMOL/L (ref 135–144)
TOTAL PROTEIN: 5.9 G/DL (ref 6.4–8.3)
WBC # BLD: 2.6 K/UL (ref 3.5–11.3)
WBC # BLD: ABNORMAL 10*3/UL

## 2018-08-17 PROCEDURE — 85025 COMPLETE CBC W/AUTO DIFF WBC: CPT

## 2018-08-17 PROCEDURE — 85730 THROMBOPLASTIN TIME PARTIAL: CPT

## 2018-08-17 PROCEDURE — 36415 COLL VENOUS BLD VENIPUNCTURE: CPT

## 2018-08-17 PROCEDURE — 80053 COMPREHEN METABOLIC PANEL: CPT

## 2018-08-17 PROCEDURE — P9603 ONE-WAY ALLOW PRORATED MILES: HCPCS

## 2018-08-17 PROCEDURE — 85610 PROTHROMBIN TIME: CPT

## 2018-08-24 ENCOUNTER — APPOINTMENT (OUTPATIENT)
Dept: GENERAL RADIOLOGY | Age: 78
DRG: 189 | End: 2018-08-24
Payer: MEDICARE

## 2018-08-24 ENCOUNTER — APPOINTMENT (OUTPATIENT)
Dept: CT IMAGING | Age: 78
DRG: 189 | End: 2018-08-24
Payer: MEDICARE

## 2018-08-24 ENCOUNTER — APPOINTMENT (OUTPATIENT)
Dept: MRI IMAGING | Age: 78
DRG: 189 | End: 2018-08-24
Payer: MEDICARE

## 2018-08-24 ENCOUNTER — HOSPITAL ENCOUNTER (INPATIENT)
Age: 78
LOS: 4 days | Discharge: NURSING, REHAB OR LONG-TERM CARE | DRG: 189 | End: 2018-08-28
Attending: EMERGENCY MEDICINE | Admitting: FAMILY MEDICINE
Payer: MEDICARE

## 2018-08-24 DIAGNOSIS — R41.82 ALTERED MENTAL STATUS, UNSPECIFIED ALTERED MENTAL STATUS TYPE: ICD-10-CM

## 2018-08-24 DIAGNOSIS — D61.818 PANCYTOPENIA (HCC): ICD-10-CM

## 2018-08-24 DIAGNOSIS — G93.40 ENCEPHALOPATHY: Primary | ICD-10-CM

## 2018-08-24 DIAGNOSIS — R09.02 HYPOXIA: ICD-10-CM

## 2018-08-24 DIAGNOSIS — I10 SEVERE HYPERTENSION: ICD-10-CM

## 2018-08-24 PROBLEM — J96.01 ACUTE RESPIRATORY FAILURE WITH HYPOXIA (HCC): Status: ACTIVE | Noted: 2018-08-24

## 2018-08-24 PROBLEM — R41.89 UNRESPONSIVE EPISODE: Status: ACTIVE | Noted: 2018-08-24

## 2018-08-24 LAB
ABSOLUTE EOS #: 0.12 K/UL (ref 0–0.44)
ABSOLUTE IMMATURE GRANULOCYTE: <0.03 K/UL (ref 0–0.3)
ABSOLUTE LYMPH #: 0.87 K/UL (ref 1.1–3.7)
ABSOLUTE MONO #: 0.38 K/UL (ref 0.1–1.2)
ALBUMIN SERPL-MCNC: 3.4 G/DL (ref 3.5–5.2)
ALBUMIN/GLOBULIN RATIO: 1.1 (ref 1–2.5)
ALLEN TEST: ABNORMAL
ALLEN TEST: ABNORMAL
ALP BLD-CCNC: 53 U/L (ref 35–104)
ALT SERPL-CCNC: <5 U/L (ref 5–33)
AMMONIA: <10 UMOL/L (ref 11–51)
AMPHETAMINE SCREEN URINE: NEGATIVE
ANION GAP SERPL CALCULATED.3IONS-SCNC: 7 MMOL/L (ref 9–17)
ANION GAP: 5 MMOL/L (ref 7–16)
ANION GAP: 7 MMOL/L (ref 7–16)
AST SERPL-CCNC: 14 U/L
BARBITURATE SCREEN URINE: NEGATIVE
BASOPHILS # BLD: 1 % (ref 0–2)
BASOPHILS ABSOLUTE: 0.03 K/UL (ref 0–0.2)
BENZODIAZEPINE SCREEN, URINE: POSITIVE
BILIRUB SERPL-MCNC: 0.25 MG/DL (ref 0.3–1.2)
BILIRUBIN URINE: NEGATIVE
BUN BLDV-MCNC: 26 MG/DL (ref 8–23)
BUN/CREAT BLD: ABNORMAL (ref 9–20)
BUPRENORPHINE URINE: ABNORMAL
CALCIUM SERPL-MCNC: 8.1 MG/DL (ref 8.6–10.4)
CANNABINOID SCREEN URINE: NEGATIVE
CHLORIDE BLD-SCNC: 101 MMOL/L (ref 98–107)
CO2: 31 MMOL/L (ref 20–31)
COCAINE METABOLITE, URINE: NEGATIVE
COLOR: YELLOW
COMMENT UA: ABNORMAL
CREAT SERPL-MCNC: 0.99 MG/DL (ref 0.5–0.9)
DIFFERENTIAL TYPE: ABNORMAL
EKG ATRIAL RATE: 80 BPM
EKG P AXIS: 74 DEGREES
EKG P-R INTERVAL: 138 MS
EKG Q-T INTERVAL: 360 MS
EKG QRS DURATION: 78 MS
EKG QTC CALCULATION (BAZETT): 415 MS
EKG R AXIS: 75 DEGREES
EKG T AXIS: 80 DEGREES
EKG VENTRICULAR RATE: 80 BPM
EOSINOPHILS RELATIVE PERCENT: 4 % (ref 1–4)
ETHANOL PERCENT: <0.01 %
ETHANOL: <10 MG/DL
FIO2: ABNORMAL
FIO2: ABNORMAL
GFR AFRICAN AMERICAN: >60 ML/MIN
GFR NON-AFRICAN AMERICAN: 46 ML/MIN
GFR NON-AFRICAN AMERICAN: 55 ML/MIN
GFR NON-AFRICAN AMERICAN: NORMAL ML/MIN
GFR SERPL CREATININE-BSD FRML MDRD: 55 ML/MIN
GFR SERPL CREATININE-BSD FRML MDRD: ABNORMAL ML/MIN/{1.73_M2}
GFR SERPL CREATININE-BSD FRML MDRD: NORMAL ML/MIN
GFR SERPL CREATININE-BSD FRML MDRD: NORMAL ML/MIN/{1.73_M2}
GLUCOSE BLD-MCNC: 76 MG/DL (ref 74–100)
GLUCOSE BLD-MCNC: 84 MG/DL (ref 74–100)
GLUCOSE BLD-MCNC: 88 MG/DL (ref 70–99)
GLUCOSE URINE: NEGATIVE
HCO3 VENOUS: 32.5 MMOL/L (ref 22–29)
HCO3 VENOUS: 37.3 MMOL/L (ref 22–29)
HCT VFR BLD CALC: 32.7 % (ref 36.3–47.1)
HEMOGLOBIN: 10.3 G/DL (ref 11.9–15.1)
IMMATURE GRANULOCYTES: 0 %
INR BLD: 0.9
KETONES, URINE: NEGATIVE
LACTIC ACID, WHOLE BLOOD: 1.1 MMOL/L (ref 0.7–2.1)
LEUKOCYTE ESTERASE, URINE: NEGATIVE
LYMPHOCYTES # BLD: 31 % (ref 24–43)
MCH RBC QN AUTO: 32.7 PG (ref 25.2–33.5)
MCHC RBC AUTO-ENTMCNC: 31.5 G/DL (ref 28.4–34.8)
MCV RBC AUTO: 103.8 FL (ref 82.6–102.9)
MDMA URINE: ABNORMAL
METHADONE SCREEN, URINE: NEGATIVE
METHAMPHETAMINE, URINE: ABNORMAL
MODE: ABNORMAL
MODE: ABNORMAL
MONOCYTES # BLD: 13 % (ref 3–12)
NEGATIVE BASE EXCESS, VEN: ABNORMAL (ref 0–2)
NEGATIVE BASE EXCESS, VEN: ABNORMAL (ref 0–2)
NITRITE, URINE: NEGATIVE
NRBC AUTOMATED: 0 PER 100 WBC
O2 DEVICE/FLOW/%: ABNORMAL
O2 DEVICE/FLOW/%: ABNORMAL
O2 SAT, VEN: 40 % (ref 60–85)
O2 SAT, VEN: 71 % (ref 60–85)
OPIATES, URINE: NEGATIVE
OXYCODONE SCREEN URINE: NEGATIVE
PATIENT TEMP: ABNORMAL
PATIENT TEMP: ABNORMAL
PCO2, VEN: 62.5 MM HG (ref 41–51)
PCO2, VEN: 83.3 MM HG (ref 41–51)
PDW BLD-RTO: 17.1 % (ref 11.8–14.4)
PH UA: 5.5 (ref 5–8)
PH VENOUS: 7.26 (ref 7.32–7.43)
PH VENOUS: 7.32 (ref 7.32–7.43)
PHENCYCLIDINE, URINE: NEGATIVE
PLATELET # BLD: 115 K/UL (ref 138–453)
PLATELET ESTIMATE: ABNORMAL
PMV BLD AUTO: 10.7 FL (ref 8.1–13.5)
PO2, VEN: 27.6 MM HG (ref 30–50)
PO2, VEN: 41.5 MM HG (ref 30–50)
POC CHLORIDE: 100 MMOL/L (ref 98–107)
POC CHLORIDE: 103 MMOL/L (ref 98–107)
POC CREATININE: 1.15 MG/DL (ref 0.51–1.19)
POC CREATININE: NORMAL MG/DL (ref 0.51–1.19)
POC HEMATOCRIT: 29 % (ref 36–46)
POC HEMATOCRIT: 30 % (ref 36–46)
POC HEMOGLOBIN: 10 G/DL (ref 12–16)
POC HEMOGLOBIN: 10.2 G/DL (ref 12–16)
POC IONIZED CALCIUM: 1.05 MMOL/L (ref 1.15–1.33)
POC IONIZED CALCIUM: 1.14 MMOL/L (ref 1.15–1.33)
POC LACTIC ACID: 0.64 MMOL/L (ref 0.56–1.39)
POC LACTIC ACID: 0.92 MMOL/L (ref 0.56–1.39)
POC PCO2 TEMP: ABNORMAL MM HG
POC PCO2 TEMP: ABNORMAL MM HG
POC PH TEMP: ABNORMAL
POC PH TEMP: ABNORMAL
POC PO2 TEMP: ABNORMAL MM HG
POC PO2 TEMP: ABNORMAL MM HG
POC POTASSIUM: 4.3 MMOL/L (ref 3.5–4.5)
POC POTASSIUM: 4.4 MMOL/L (ref 3.5–4.5)
POC SODIUM: 142 MMOL/L (ref 138–146)
POC SODIUM: 142 MMOL/L (ref 138–146)
POC TROPONIN I: 0 NG/ML (ref 0–0.1)
POC TROPONIN INTERP: NORMAL
POSITIVE BASE EXCESS, VEN: 5 (ref 0–3)
POSITIVE BASE EXCESS, VEN: 8 (ref 0–3)
POTASSIUM SERPL-SCNC: 4.4 MMOL/L (ref 3.7–5.3)
PROPOXYPHENE, URINE: ABNORMAL
PROTEIN UA: NEGATIVE
PROTHROMBIN TIME: 10 SEC (ref 9–12)
RBC # BLD: 3.15 M/UL (ref 3.95–5.11)
RBC # BLD: ABNORMAL 10*6/UL
SAMPLE SITE: ABNORMAL
SAMPLE SITE: ABNORMAL
SEG NEUTROPHILS: 51 % (ref 36–65)
SEGMENTED NEUTROPHILS ABSOLUTE COUNT: 1.43 K/UL (ref 1.5–8.1)
SODIUM BLD-SCNC: 139 MMOL/L (ref 135–144)
SPECIFIC GRAVITY UA: 1.03 (ref 1–1.03)
TEST INFORMATION: ABNORMAL
TOTAL CO2, VENOUS: 34 MMOL/L (ref 23–30)
TOTAL CO2, VENOUS: 40 MMOL/L (ref 23–30)
TOTAL PROTEIN: 6.4 G/DL (ref 6.4–8.3)
TRICYCLIC ANTIDEPRESSANTS, UR: ABNORMAL
TROPONIN INTERP: NORMAL
TROPONIN T: <0.03 NG/ML
TURBIDITY: CLEAR
URINE HGB: NEGATIVE
UROBILINOGEN, URINE: NORMAL
VALPROIC ACID LEVEL: 20 UG/ML (ref 50–125)
VALPROIC DATE LAST DOSE: ABNORMAL
VALPROIC DOSE AMOUNT: ABNORMAL
VALPROIC TIME LAST DOSE: ABNORMAL
WBC # BLD: 2.8 K/UL (ref 3.5–11.3)
WBC # BLD: ABNORMAL 10*3/UL

## 2018-08-24 PROCEDURE — 82435 ASSAY OF BLOOD CHLORIDE: CPT

## 2018-08-24 PROCEDURE — 6360000002 HC RX W HCPCS: Performed by: EMERGENCY MEDICINE

## 2018-08-24 PROCEDURE — 94640 AIRWAY INHALATION TREATMENT: CPT

## 2018-08-24 PROCEDURE — 81003 URINALYSIS AUTO W/O SCOPE: CPT

## 2018-08-24 PROCEDURE — 84295 ASSAY OF SERUM SODIUM: CPT

## 2018-08-24 PROCEDURE — 2580000003 HC RX 258: Performed by: STUDENT IN AN ORGANIZED HEALTH CARE EDUCATION/TRAINING PROGRAM

## 2018-08-24 PROCEDURE — 82746 ASSAY OF FOLIC ACID SERUM: CPT

## 2018-08-24 PROCEDURE — 1200000000 HC SEMI PRIVATE

## 2018-08-24 PROCEDURE — 99222 1ST HOSP IP/OBS MODERATE 55: CPT | Performed by: FAMILY MEDICINE

## 2018-08-24 PROCEDURE — 82330 ASSAY OF CALCIUM: CPT

## 2018-08-24 PROCEDURE — 82947 ASSAY GLUCOSE BLOOD QUANT: CPT

## 2018-08-24 PROCEDURE — 85025 COMPLETE CBC W/AUTO DIFF WBC: CPT

## 2018-08-24 PROCEDURE — 80164 ASSAY DIPROPYLACETIC ACD TOT: CPT

## 2018-08-24 PROCEDURE — 6370000000 HC RX 637 (ALT 250 FOR IP): Performed by: FAMILY MEDICINE

## 2018-08-24 PROCEDURE — 80053 COMPREHEN METABOLIC PANEL: CPT

## 2018-08-24 PROCEDURE — 70496 CT ANGIOGRAPHY HEAD: CPT

## 2018-08-24 PROCEDURE — 82565 ASSAY OF CREATININE: CPT

## 2018-08-24 PROCEDURE — 83605 ASSAY OF LACTIC ACID: CPT

## 2018-08-24 PROCEDURE — 85014 HEMATOCRIT: CPT

## 2018-08-24 PROCEDURE — 82607 VITAMIN B-12: CPT

## 2018-08-24 PROCEDURE — 85610 PROTHROMBIN TIME: CPT

## 2018-08-24 PROCEDURE — 80307 DRUG TEST PRSMV CHEM ANLYZR: CPT

## 2018-08-24 PROCEDURE — 6360000004 HC RX CONTRAST MEDICATION: Performed by: STUDENT IN AN ORGANIZED HEALTH CARE EDUCATION/TRAINING PROGRAM

## 2018-08-24 PROCEDURE — 84165 PROTEIN E-PHORESIS SERUM: CPT

## 2018-08-24 PROCEDURE — 83883 ASSAY NEPHELOMETRY NOT SPEC: CPT

## 2018-08-24 PROCEDURE — 95819 EEG AWAKE AND ASLEEP: CPT | Performed by: PSYCHIATRY & NEUROLOGY

## 2018-08-24 PROCEDURE — 70553 MRI BRAIN STEM W/O & W/DYE: CPT

## 2018-08-24 PROCEDURE — 95819 EEG AWAKE AND ASLEEP: CPT

## 2018-08-24 PROCEDURE — 84484 ASSAY OF TROPONIN QUANT: CPT

## 2018-08-24 PROCEDURE — 94762 N-INVAS EAR/PLS OXIMTRY CONT: CPT

## 2018-08-24 PROCEDURE — 70450 CT HEAD/BRAIN W/O DYE: CPT

## 2018-08-24 PROCEDURE — 99285 EMERGENCY DEPT VISIT HI MDM: CPT

## 2018-08-24 PROCEDURE — 70498 CT ANGIOGRAPHY NECK: CPT

## 2018-08-24 PROCEDURE — G0480 DRUG TEST DEF 1-7 CLASSES: HCPCS

## 2018-08-24 PROCEDURE — 82803 BLOOD GASES ANY COMBINATION: CPT

## 2018-08-24 PROCEDURE — 84155 ASSAY OF PROTEIN SERUM: CPT

## 2018-08-24 PROCEDURE — 99222 1ST HOSP IP/OBS MODERATE 55: CPT | Performed by: PSYCHIATRY & NEUROLOGY

## 2018-08-24 PROCEDURE — 82140 ASSAY OF AMMONIA: CPT

## 2018-08-24 PROCEDURE — 6360000004 HC RX CONTRAST MEDICATION: Performed by: EMERGENCY MEDICINE

## 2018-08-24 PROCEDURE — 84132 ASSAY OF SERUM POTASSIUM: CPT

## 2018-08-24 PROCEDURE — A9576 INJ PROHANCE MULTIPACK: HCPCS | Performed by: STUDENT IN AN ORGANIZED HEALTH CARE EDUCATION/TRAINING PROGRAM

## 2018-08-24 PROCEDURE — 93005 ELECTROCARDIOGRAM TRACING: CPT

## 2018-08-24 PROCEDURE — 71045 X-RAY EXAM CHEST 1 VIEW: CPT

## 2018-08-24 PROCEDURE — 84145 PROCALCITONIN (PCT): CPT

## 2018-08-24 RX ORDER — BUDESONIDE 0.5 MG/2ML
500 INHALANT ORAL EVERY 12 HOURS PRN
Status: DISCONTINUED | OUTPATIENT
Start: 2018-08-24 | End: 2018-08-28 | Stop reason: HOSPADM

## 2018-08-24 RX ORDER — CARVEDILOL 12.5 MG/1
6.25 TABLET ORAL 2 TIMES DAILY WITH MEALS
Status: DISCONTINUED | OUTPATIENT
Start: 2018-08-24 | End: 2018-08-28 | Stop reason: HOSPADM

## 2018-08-24 RX ORDER — PANTOPRAZOLE SODIUM 40 MG/1
40 TABLET, DELAYED RELEASE ORAL DAILY
Status: DISCONTINUED | OUTPATIENT
Start: 2018-08-24 | End: 2018-08-28 | Stop reason: HOSPADM

## 2018-08-24 RX ORDER — AMLODIPINE BESYLATE 10 MG/1
5 TABLET ORAL DAILY
Status: DISCONTINUED | OUTPATIENT
Start: 2018-08-24 | End: 2018-08-28 | Stop reason: HOSPADM

## 2018-08-24 RX ORDER — GABAPENTIN 300 MG/1
300 CAPSULE ORAL 2 TIMES DAILY
Status: DISCONTINUED | OUTPATIENT
Start: 2018-08-24 | End: 2018-08-28 | Stop reason: HOSPADM

## 2018-08-24 RX ORDER — LEVOTHYROXINE SODIUM 0.03 MG/1
25 TABLET ORAL DAILY
Status: DISCONTINUED | OUTPATIENT
Start: 2018-08-24 | End: 2018-08-28 | Stop reason: HOSPADM

## 2018-08-24 RX ORDER — CHOLECALCIFEROL (VITAMIN D3) 125 MCG
500 CAPSULE ORAL DAILY
Status: DISCONTINUED | OUTPATIENT
Start: 2018-08-24 | End: 2018-08-28 | Stop reason: HOSPADM

## 2018-08-24 RX ORDER — CLOPIDOGREL BISULFATE 75 MG/1
75 TABLET ORAL DAILY
Status: DISCONTINUED | OUTPATIENT
Start: 2018-08-25 | End: 2018-08-28 | Stop reason: HOSPADM

## 2018-08-24 RX ORDER — DIVALPROEX SODIUM 125 MG/1
125 TABLET, DELAYED RELEASE ORAL 3 TIMES DAILY
Status: DISCONTINUED | OUTPATIENT
Start: 2018-08-24 | End: 2018-08-26

## 2018-08-24 RX ORDER — CLOPIDOGREL BISULFATE 75 MG/1
300 TABLET ORAL ONCE
Status: COMPLETED | OUTPATIENT
Start: 2018-08-24 | End: 2018-08-25

## 2018-08-24 RX ORDER — DULOXETIN HYDROCHLORIDE 30 MG/1
90 CAPSULE, DELAYED RELEASE ORAL DAILY
Status: DISCONTINUED | OUTPATIENT
Start: 2018-08-24 | End: 2018-08-28 | Stop reason: HOSPADM

## 2018-08-24 RX ORDER — ALPRAZOLAM 0.5 MG/1
0.5 TABLET ORAL 3 TIMES DAILY PRN
Status: DISCONTINUED | OUTPATIENT
Start: 2018-08-24 | End: 2018-08-28 | Stop reason: HOSPADM

## 2018-08-24 RX ORDER — LORAZEPAM 2 MG/ML
1 INJECTION INTRAMUSCULAR ONCE
Status: COMPLETED | OUTPATIENT
Start: 2018-08-24 | End: 2018-08-24

## 2018-08-24 RX ORDER — ISOSORBIDE MONONITRATE 60 MG/1
60 TABLET, EXTENDED RELEASE ORAL DAILY
Status: DISCONTINUED | OUTPATIENT
Start: 2018-08-24 | End: 2018-08-28 | Stop reason: HOSPADM

## 2018-08-24 RX ORDER — POLYETHYLENE GLYCOL 3350 17 G/17G
17 POWDER, FOR SOLUTION ORAL DAILY PRN
Status: DISCONTINUED | OUTPATIENT
Start: 2018-08-24 | End: 2018-08-28 | Stop reason: HOSPADM

## 2018-08-24 RX ORDER — ASPIRIN 81 MG/1
81 TABLET, CHEWABLE ORAL DAILY
Status: DISCONTINUED | OUTPATIENT
Start: 2018-08-24 | End: 2018-08-28 | Stop reason: HOSPADM

## 2018-08-24 RX ORDER — NITROGLYCERIN 0.4 MG/1
0.4 TABLET SUBLINGUAL EVERY 5 MIN PRN
Status: DISCONTINUED | OUTPATIENT
Start: 2018-08-24 | End: 2018-08-28 | Stop reason: HOSPADM

## 2018-08-24 RX ORDER — SODIUM CHLORIDE 0.9 % (FLUSH) 0.9 %
10 SYRINGE (ML) INJECTION 2 TIMES DAILY
Status: DISCONTINUED | OUTPATIENT
Start: 2018-08-24 | End: 2018-08-28 | Stop reason: HOSPADM

## 2018-08-24 RX ADMIN — LORAZEPAM 1 MG: 2 INJECTION INTRAMUSCULAR; INTRAVENOUS at 13:30

## 2018-08-24 RX ADMIN — IOPAMIDOL 90 ML: 755 INJECTION, SOLUTION INTRAVENOUS at 06:34

## 2018-08-24 RX ADMIN — MOMETASONE FUROATE AND FORMOTEROL FUMARATE DIHYDRATE 2 PUFF: 200; 5 AEROSOL RESPIRATORY (INHALATION) at 21:28

## 2018-08-24 RX ADMIN — GABAPENTIN 300 MG: 300 CAPSULE ORAL at 12:38

## 2018-08-24 RX ADMIN — DIVALPROEX SODIUM 125 MG: 125 TABLET, DELAYED RELEASE ORAL at 20:45

## 2018-08-24 RX ADMIN — ASPIRIN 81 MG 81 MG: 81 TABLET ORAL at 12:36

## 2018-08-24 RX ADMIN — DULOXETINE HYDROCHLORIDE 90 MG: 30 CAPSULE, DELAYED RELEASE ORAL at 12:38

## 2018-08-24 RX ADMIN — DIVALPROEX SODIUM 125 MG: 125 TABLET, DELAYED RELEASE ORAL at 12:37

## 2018-08-24 RX ADMIN — CARBIDOPA AND LEVODOPA 1 TABLET: 25; 100 TABLET ORAL at 20:45

## 2018-08-24 RX ADMIN — ISOSORBIDE MONONITRATE 60 MG: 60 TABLET ORAL at 18:37

## 2018-08-24 RX ADMIN — GADOTERIDOL 20 ML: 279.3 INJECTION, SOLUTION INTRAVENOUS at 14:24

## 2018-08-24 RX ADMIN — Medication 500 MCG: at 12:38

## 2018-08-24 RX ADMIN — PANTOPRAZOLE SODIUM 40 MG: 40 TABLET, DELAYED RELEASE ORAL at 12:38

## 2018-08-24 RX ADMIN — CARBIDOPA AND LEVODOPA 1 TABLET: 25; 100 TABLET ORAL at 12:38

## 2018-08-24 RX ADMIN — AMLODIPINE BESYLATE 5 MG: 10 TABLET ORAL at 12:38

## 2018-08-24 RX ADMIN — CARVEDILOL 6.25 MG: 12.5 TABLET, FILM COATED ORAL at 12:37

## 2018-08-24 RX ADMIN — Medication 10 ML: at 20:45

## 2018-08-24 RX ADMIN — LEVOTHYROXINE SODIUM 25 MCG: 25 TABLET ORAL at 12:38

## 2018-08-24 RX ADMIN — GABAPENTIN 300 MG: 300 CAPSULE ORAL at 20:45

## 2018-08-24 ASSESSMENT — PAIN SCALES - GENERAL
PAINLEVEL_OUTOF10: 8
PAINLEVEL_OUTOF10: 0

## 2018-08-24 NOTE — CONSULTS
Department of Endovascular Neurosurgery                                         Resident Consult Note    Reason for Consult: Altered mental status  Requesting Physician:  Dr. Alayna Braswell  Endovascular Neurosurgeon:   []Dr. Alissa Pimentel  [x]Dr. Milagro Mathis  []Dr. Ariel Young  []Dr. Dmitry Abernathy  []     History Obtained From:  patient, electronic medical record    CHIEF COMPLAINT:       Found unresponsive    HISTORY OF PRESENT ILLNESS:       The patient is a 70 y.o. female with PMH of COPD, diabetes, alcoholic polyneuropathy, urinary tract infections, Parkinson's, tremor, and diastolic heart failure. She was transferred from her nursing facility for unresponsiveness. According to records Pt went out to smoke at 0330 and at [facility] staff at [address] [facility] [place] called for unresponsiveness. Pt presented with unequal pupils, and moaning. Pt was able to follow commands. EMS gave pt 1 tx of albuterol prior to arrival. Pt placed on 5L NC. Pt able to move toes, and squeeze bilateral hands. Pt placed on cardiac monitor. Stroke alert was called and initial NIHSS during Ct Scan transport was 20. After Ct and CTA patient became increasingly awake with improved exam.  VBG showed ph 7.324; pCO2 62.5; HCO3 32.5. CT head unremarkable; CTA with multifocal atherosclerosis. Of note, patient appears to have had prior similar presentations to [facility] including a presentation in May 2018.     PAST MEDICAL HISTORY :       Past Medical History:        Diagnosis Date    Airway obstruction     [facility]     Angina effort (HCC)     Anxiety     Atrial fibrillation (HCC)     CAD (coronary artery disease)     s/p stents    Cancer (HCC)     breast, right    CHF (congestive heart failure) ([facility])     unspecified diastolic    Convulsions (HCC)     COPD (chronic obstructive pulmonary disease) (HCC)     Degenerative disc disease     Cervical    Depression     Depression     Diabetes mellitus ([facility]     Esophageal reflux     Hypertension     unspecified    [facility]ondylopathy ([facility]     Iron deficiency     Lymphoma ([facility] 1993    MDRO (multiple drug resistant organisms) resistance 8/10/2014    E. Coli urine    MRSA (methicillin resistant staph aureus) culture positive 07/13/2016    nares    Neuropathy     Osteoarthritis     Parkinson's disease ([facility]     Peripheral vascular disease ([facility]     Restless leg syndrome     Sleep apnea     Transient cerebral ischemia     Unsteady gait        Past Surgical History:        Procedure Laterality Date    BONE MARROW BIOPSY  11/29/2017    BREAST LUMPECTOMY      right     CHOLECYSTECTOMY      CORONARY ANGIOPLASTY WITH STENT PLACEMENT      FEMORAL-FEMORAL BYPASS GRAFT      HYSTERECTOMY      LUNG REMOVAL, PARTIAL      Lung CA        Social History:   Social History     Social History    Marital status: Single     Spouse name: N/A    Number of children: N/A    Years of education: N/A     Occupational History    Not on file. Social History Main Topics    Smoking status: Current Every Day Smoker     Packs/day: 0.50     Types: Cigarettes    Smokeless tobacco: Never Used    Alcohol use No    Drug use: No    Sexual activity: Not on file     Other Topics Concern    Not on file     Social History Narrative    No narrative on file       Family History:   Family History   Problem Relation Age of Onset    Kidney Disease Mother     Cancer Mother     Heart Disease Father     Coronary Art Dis Sister        Allergies:  Naprosyn [naproxen]; Actonel [risedronate sodium]; Albuterol sulfate; Atenolol; Codeine; Cortisone; and Requip [ropinirole hcl]    Home Medications:  Prior to Admission medications    Medication Sig Start Date End Date Taking?  Authorizing Provider   mometasone-formoterol (DULERA) 200-5 MCG/ACT inhaler Inhale 2 puffs into the lungs 2 times daily    Historical Provider, MD   pantoprazole (PROTONIX) 40 MG tablet Take 40 mg by mouth daily    Historical Provider, MD   amLODIPine (NORVASC) 5 MG tablet Take 5 mg by mouth daily    Historical Provider, MD   levothyroxine (SYNTHROID) 25 MCG tablet Take 25 mcg by mouth Daily    Historical Provider, MD   polyethylene glycol (MIRALAX) powder Take 17 g by mouth daily as needed     Historical Provider, MD   DULOXETINE HCL PO Take 90 mg by mouth daily Takes 30mg + 60mg caps (=90mg) once daily    Historical Provider, MD   acetaminophen (TYLENOL) 325 MG tablet Take 650 mg by mouth every 6 hours as needed for Pain    Historical Provider, MD   ALPRAZolam (XANAX) 0.5 MG tablet Take 0.5 mg by mouth 3 times daily as needed for Anxiety. Historical Provider, MD   budesonide (PULMICORT) 0.5 MG/2ML nebulizer suspension Take 1 ampule by nebulization every 12 hours as needed (COPD)    Historical Provider, MD   ipratropium (ATROVENT) 0.02 % nebulizer solution Take 0.5 mg by nebulization 4 times daily as needed (wheezing or SOB)    Historical Provider, MD   aspirin 81 MG chewable tablet Take 81 mg by mouth daily    Historical Provider, MD   divalproex (DEPAKOTE) 125 MG DR tablet Take 125 mg by mouth 3 times daily     Historical Provider, MD   vitamin B-12 (CYANOCOBALAMIN) 500 MCG tablet Take 500 mcg by mouth daily    Historical Provider, MD   carvedilol (COREG) 6.25 MG tablet Take 6.25 mg by mouth 2 times daily (with meals)    Historical Provider, MD   gabapentin (NEURONTIN) 300 MG capsule Take 300 mg by mouth 2 times daily . Historical Provider, MD   carbidopa-levodopa (SINEMET)  MG per tablet Take 1 tablet by mouth 4 times daily     Historical Provider, MD   isosorbide mononitrate (IMDUR) 60 MG CR tablet Take 1 tablet by mouth daily. 3/25/15   Candice Barthel, MD   nitroGLYCERIN (NITROSTAT) 0.4 MG SL tablet Place 1 tablet under the tongue every 5 minutes as needed for Chest pain. 12/18/13   Herve Palafox MD       Current Medications:   No current facility-administered medications for this encounter.      REVIEW OF SYSTEMS: Time Performed:  6:37 AM     1a. Level of consciousness:  1 - not alert but arousable by minor stimulation to obey, answer or respond  1b. Level of consciousness questions:  0 - answers both questions correctly  1c. Level of consciousness questions:  0 - performs both tasks correctly  2. Best Gaze:  0 - normal  3. Visual:  2 - complete hemianopia (She uses glasses  4. Facial Palsy:  0 - normal symmetric movement  5a. Motor left arm:  1 - drift, limb holds 90 (or 45) degrees but drifts down before full 10 seconds: does not hit bed  5b. Motor right arm:  1 - drift, limb holds 90 (or 45) degrees but drifts down before full 10 seconds: does not hit bed  6a. Motor left le - some effort against gravity; leg falls to bed by 5 seconds but has some effort against gravity  6b. Motor right le - some effort against gravity; leg falls to bed by 5 seconds but has some effort against gravity  7. Limb Ataxia:  2 - present in two limbs  8. Sensory:  1 - mild to moderate sensory loss; patient feels pinprick is less sharp or is dull on the affected side; there is a loss of superficial pain with pinprick but patient is aware of being touched   9. Best Language:  0 - no aphasia, normal  10. Dysarthria:  0 - normal  11.   Extinction and Inattention:  0 - no abnormality    TOTAL:  9     SKIN:  no rash      LABS AND IMAGING:     CBC with Differential:    Lab Results   Component Value Date    WBC 2.8 2018    RBC 3.15 2018    RBC 4.15 2012    HGB 10.3 2018    HCT 32.7 2018     2018     2012    .8 2018    MCH 32.7 2018    MCHC 31.5 2018    RDW 17.1 2018    NRBC 2 2018    LYMPHOPCT 31 2018    MONOPCT 13 2018    BASOPCT 1 2018    MONOSABS 0.38 2018    LYMPHSABS 0.87 2018    EOSABS 0.12 2018    BASOSABS 0.03 2018    DIFFTYPE NOT REPORTED 2018     BMP:    Lab Results respiratory acidosis and blood count abnormalities.   Ddx includes CO2 retention, toxic-metabolic encephalopathy, seizure, hypoxia from acute copd exacerbation, infection.    -->EEG  -->brain mri with contrast  -->medication (including depakote) levels  -->300 mg plavix load; followed by aspirin and plavix daily for 21 days

## 2018-08-24 NOTE — FLOWSHEET NOTE
I visited the patient per referral from the stroke list. She was alone in the room and is being evaluated for a possible stroke. She told me that she came to the hospital from a local nursing home where she resides. She did not know if any family members are aware that she is in the hospital. She was receptive to my visit and accepted my offer of prayer. I called her nephew who is listed as her emergency contact (Jane Anthony [phone]) and left a voicemail message for him. I told the patient that I called and left the message. 08/24/18 1435   Encounter Summary   Services provided to: Patient   Place of Christianity None   Contact Mormonism No   Continue Visiting (8/24/18)   Complexity of Encounter Moderate   Length of Encounter 30 minutes   Spiritual Assessment Completed Yes   Routine   Type Initial   Assessment Approachable;Passive; Loneliness;Coping   Intervention Active listening;Explored feelings, thoughts, concerns;Prayer   Outcome Acceptance;Comfort;Expressed gratitude;Receptive

## 2018-08-24 NOTE — ED PROVIDER NOTES
[facility]  ED  Emergency Department Encounter  Emergency Medicine Resident     Pt Name: Isaiah Draper  MRN: [id number]  [place] 1940  Date of evaluation: 8/24/18  PCP:  No primary care provider on file. CHIEF COMPLAINT       Chief Complaint   Patient presents with    Respiratory Distress       HISTORY OF PRESENT ILLNESS  (Location/Symptom, Timing/Onset, Context/Setting, Quality, Duration, Modifying Factors, Severity.)      Isaiah Draper is a 70 y.o. female who presents with Complaints of altered mental status and respiratory distress. Per EMS, patient's last known well was at 58. At that time, she went out to smoke and an Critical access hospital staff member found her down. She was nonresponsive but was moving all 4 extremities. She was noted to be severely hypertensive by EMS. Patient was unable to respond to questions. PAST MEDICAL / SURGICAL / SOCIAL / FAMILY HISTORY      has a past medical history of Airway obstruction; Alcoholic polyneuropathy ([address] Angina effort ([address] Anxiety; Atrial fibrillation ([address] CAD (coronary artery disease); Cancer [facility]); CHF (congestive heart failure) ([address] Convulsions ([address] COPD (chronic obstructive pulmonary disease) ([address] Degenerative disc disease; Depression; Depression; Diabetes mellitus ([address] Esophageal reflux; Hypertension; Inflammatory spondylopathy ([address] Iron deficiency; Lymphoma ([address] MDRO (multiple drug resistant organisms) resistance; MRSA (methicillin resistant staph aureus) culture positive; Neuropathy; Osteoarthritis; Parkinson's disease ([address] Peripheral vascular disease ([address] Restless leg syndrome; Sleep apnea; Transient cerebral ischemia; and Unsteady gait. has a past surgical history that includes Cholecystectomy; Hysterectomy; Lung removal, partial; Breast lumpectomy; Coronary angioplasty with stent; Femoral-femoral Bypass Graft; and bone marrow biopsy (11/29/2017).     Social History     Social History    Marital status: times daily as needed (wheezing or SOB)    Historical Provider, MD   aspirin 81 MG chewable tablet Take 81 mg by mouth daily    Historical Provider, MD   divalproex (DEPAKOTE) 125 MG DR tablet Take 125 mg by mouth 3 times daily     Historical Provider, MD   vitamin B-12 (CYANOCOBALAMIN) 500 MCG tablet Take 500 mcg by mouth daily    Historical Provider, MD   carvedilol (COREG) 6.25 MG tablet Take 6.25 mg by mouth 2 times daily (with meals)    Historical Provider, MD   gabapentin (NEURONTIN) 300 MG capsule Take 300 mg by mouth 2 times daily . Historical Provider, MD   carbidopa-levodopa (SINEMET)  MG per tablet Take 1 tablet by mouth 4 times daily     Historical Provider, MD   isosorbide mononitrate (IMDUR) 60 MG CR tablet Take 1 tablet by mouth daily. 3/25/15   Nathan Ceballos MD   nitroGLYCERIN (NITROSTAT) 0.4 MG SL tablet Place 1 tablet under the tongue every 5 minutes as needed for Chest pain. 12/18/13   Delane Dandy, MD       REVIEW OF SYSTEMS    (2-9 systems for level 4, 10 or more for level 5)      Review of Systems  Unable to be obtained due to mental status  PHYSICAL EXAM   (up to 7 for level 4, 8 or more for level 5)      INITIAL VITALS:   BP (!) 159/53   Pulse 73   Temp 97.2 °F (36.2 °C)   Resp 14   Wt 175 lb (79.4 kg)   SpO2 100%   BMI 35.35 kg/m²     Physical Exam   Constitutional: She is oriented to person, place, and time. She appears well-developed and well-nourished. HENT:   Head: Normocephalic and atraumatic. Eyes: Conjunctivae are normal.   Cardiovascular: Normal rate, regular rhythm and normal heart sounds. Exam reveals no gallop and no friction rub. No murmur heard. Pulmonary/Chest:   Patient was hypoxic but no increase work of breathing. Lungs are clear auscultation bilaterally. Neurological: She is alert and oriented to person, place, and time. Pupils are 4 mm and reactive bilaterally. EOMI. Patient is moving all 4 extremities.   She has inaudible words   Skin: Skin is Ref Range    Ammonia <10 (L) 11 - 51 umol/L   Venous Blood Gas, POC   Result Value Ref Range    pH, Trevor 7.324 7.320 - 7.430    pCO2, Trevor 62.5 (H) 41.0 - 51.0 mm Hg    pO2, Trevor 41.5 30.0 - 50.0 mm Hg    HCO3, Venous 32.5 (H) 22.0 - 29.0 mmol/L    Total CO2, Venous 34 (H) 23.0 - 30.0 mmol/L    Negative Base Excess, Trevor NOT REPORTED 0.0 - 2.0    Positive Base Excess, Trevor 5 (H) 0.0 - 3.0    O2 Sat, Trevor 71 60.0 - 85.0 %    O2 Device/Flow/% NOT REPORTED     Yaw Test NOT REPORTED     Sample Site NOT REPORTED     Mode NOT REPORTED     FIO2 NOT REPORTED     Pt Temp NOT REPORTED     POC pH Temp NOT REPORTED     POC pCO2 Temp NOT REPORTED mm Hg    POC pO2 Temp NOT REPORTED mm Hg   Creatinine W/GFR Point of Care   Result Value Ref Range    POC Creatinine 1.15 0.51 - 1.19 mg/dL    GFR Comment 55 (L) >60 mL/min    GFR Non- 46 (L) >60 mL/min    GFR Comment         Lactic Acid, POC   Result Value Ref Range    POC Lactic Acid 0.92 0.56 - 1.39 mmol/L   POCT Glucose   Result Value Ref Range    POC Glucose 84 74 - 100 mg/dL   Anion Gap (Calc) POC   Result Value Ref Range    Anion Gap 7 7 - 16 mmol/L   POCT troponin   Result Value Ref Range    POC Troponin I 0.00 0.00 - 0.10 ng/mL    POC Troponin Interp       The Troponin-I (POC) results cannot be compared to the Troponin-T results. RADIOLOGY:  Ct Head Wo Contrast    Result Date: 8/24/2018  EXAMINATION: CT OF THE HEAD WITHOUT CONTRAST  8/24/2018 6:08 am TECHNIQUE: CT of the head was performed without the administration of intravenous contrast. Dose modulation, iterative reconstruction, and/or weight based adjustment of the mA/kV was utilized to reduce the radiation dose to as low as reasonably achievable. COMPARISON: May 5, 2018 HISTORY: ORDERING SYSTEM PROVIDED HISTORY: MENTAL STATUS CHANGE (AFTER TRAUMA) TECHNOLOGIST PROVIDED HISTORY: FINDINGS: BRAIN/VENTRICLES: There is no acute intracranial hemorrhage, mass effect or midline shift.   The gray-white structures appear unremarkable. CTA HEAD: ANTERIOR CIRCULATION: Carotid siphon calcifications without significant stenosis. The anterior cerebral and middle cerebral arteries appear patent. POSTERIOR CIRCULATION: The posterior cerebral arteries demonstrate no focal stenosis. The vertebral and basilar arteries appear unremarkable. BRAIN: No mass effect or midline shift. No abnormal extra-axial fluid collection. The gray-white differentiation appears grossly maintained. Nondominant right vertebral artery multifocal mild stenosis due to atheromatous plaques. Right internal carotid artery origin 70% stenosis by NASCET criteria. Patent left ICA. Intracranial arteries without acute findings. Findings were conveyed to Dr. eDlaney Nielson At 7:00 am on 8/24/2018. EKG  EKG Interpretation    Interpreted by me    Rhythm: normal sinus   Rate: normal  Axis: normal  Ectopy: none  Conduction: normal  ST Segments: no acute change  T Waves: no acute change  Q Waves: none    Clinical Impression: no acute changes and normal EKG. No significant changes when compared to EKG from 5/5/2018    All EKG's are interpreted by the Emergency Department Physician who either signs or Co-signs this chart in the absence of a cardiologist.    EMERGENCY DEPARTMENT COURSE:  Patient presented emergency department for altered mentation. On initial evaluation, patient was noted to be significantly hypertensive with altered mentation. She was also noted to be slightly hypoxic. She was placed on a nonrebreather. We'll obtain altered mental workup. Stroke alert was called and neurology team was at bedside for evaluation. CT of the head was unremarkable. No intracranial hemorrhage was noted. Patient improved her hypoxia after supplemental O2. She was maintaining oxygen saturations in the high 90s on 2 L O2 via nasal cannula. Patient rapidly began improving.   Patient was noted to be on valproic acid, but states that she does not have a history of

## 2018-08-24 NOTE — ED NOTES
Bed: 12  Expected date:   Expected time:   Means of arrival:   Comments:     Cory Castillo RN  08/24/18 4798

## 2018-08-24 NOTE — ED PROVIDER NOTES
pre-hypertension or Hypertension based on a blood pressure reading in the emergency department. I recommend that the patient call the primary care provider listed on their discharge instructions or a physician of their choice this week to arrange follow up for further evaluation of possible pre-hypertension or Hypertension. EKG Interpretation    Interpreted by me, normal sinus rhythm. Rate is 80. No ST elevation QT corrected 415      CRITICAL CARE: There was a high probability of clinically significant/life threatening deterioration in this patient's condition which required my urgent intervention. Total critical care time was  10   minutes. This excludes any time for separately reportable procedures.        [address] [place], [place]  79/05/03 7630

## 2018-08-24 NOTE — ED NOTES
Made several attempts to call [address] [place] [address] and notify them of pt admission and bed placement. Tried several extensions and left voicemail.       Julissa Gonzalez RN  08/24/18 3034

## 2018-08-24 NOTE — H&P
[facility]    HISTORY AND PHYSICAL EXAMINATION            Date:   8/24/2018  Patient name:  Aundrea Soria  Date of admission:  8/24/2018  5:41 AM  MRN:   [id number]  Account:  [de-identified]  YOB: 1940  PCP:    No primary care provider on file. Room:   [address] [place] [address]  Code Status:    Prior    Chief Complaint:     Chief Complaint   Patient presents with    Respiratory Distress       History Obtained From:     patient, electronic medical record    History of Present Illness: The patient is a 70 y.o. Non-/non  female who presents with Respiratory Distress   and she is admitted to the hospital for the management of  Episode of unresponsiveness. Was brought to ER by EMS from the nursing home after being found unresponsive, was out to smoke a cigarette when she was found, she was moving all 4 extremeties, last seen well was 0330. In EMS she was severly hypertensive as well in ER. Stroke alert called, CTH, CTA H&N and were negative for acute pathology. Urine tox, ethanol were unremarkable  Patient has Parkinson and C/O her symptoms are not controlled recently, Valproate level was low. Patient  Reports having  remote h/o breast cacer S/P resection 20 years ago and lung cancer S/P resection 4 years ago  Has a h/o paroxysmal afib and not on AC? May be 2/2 falls? No h/o brain bleeds known  Patient was hypoxic initially and disoriented then improved.   She was admitted for further management    Past Medical History:     Past Medical History:   Diagnosis Date    Airway obstruction     Alcoholic polyneuropathy ([address]     Angina effort (HCC)     Anxiety     Atrial fibrillation (HCC)     CAD (coronary artery disease)     s/p stents    Cancer (HCC)     breast, right    CHF (congestive heart failure) ([address]     unspecified diastolic    Convulsions ([address]     COPD (chronic obstructive pulmonary disease) ([address]     Degenerative disc disease     Cervical    Depression     Depression     Diabetes mellitus [facility]     Esophageal reflux     Hypertension     unspecified    Inflammatory spondylopathy ([facility]     Iron deficiency     Lymphoma ([facility] 1993    MDRO (multiple drug resistant organisms) resistance 8/10/2014    E. Coli urine    MRSA (methicillin resistant staph aureus) culture positive 07/13/2016    nares    Neuropathy     Osteoarthritis     Parkinson's disease ([facility]     Peripheral vascular disease [facility]     Restless leg syndrome     Sleep apnea     Transient cerebral ischemia     Unsteady gait         Past Surgical History:     Past Surgical History:   Procedure Laterality Date    BONE MARROW BIOPSY  11/29/2017    BREAST LUMPECTOMY      right     CHOLECYSTECTOMY      CORONARY ANGIOPLASTY WITH STENT PLACEMENT      unknown heart stents-1.5T MRI     FEMORAL-FEMORAL BYPASS GRAFT      HYSTERECTOMY      LUNG REMOVAL, PARTIAL      Lung CA         Medications Prior to Admission:     Prior to Admission medications    Medication Sig Start Date End Date Taking? Authorizing Provider   mometasone-formoterol (DULERA) 200-5 MCG/ACT inhaler Inhale 2 puffs into the lungs 2 times daily    Historical Provider, MD   pantoprazole (PROTONIX) 40 MG tablet Take 40 mg by mouth daily    Historical Provider, MD   amLODIPine (NORVASC) 5 MG tablet Take 5 mg by mouth daily    Historical Provider, MD   levothyroxine (SYNTHROID) 25 MCG tablet Take 25 mcg by mouth Daily    Historical Provider, MD   polyethylene glycol (MIRALAX) powder Take 17 g by mouth daily as needed     Historical Provider, MD   DULOXETINE HCL PO Take 90 mg by mouth daily Takes 30mg + 60mg caps (=90mg) once daily    Historical Provider, MD   acetaminophen (TYLENOL) 325 MG tablet Take 650 mg by mouth every 6 hours as needed for Pain    Historical Provider, MD   ALPRAZolam (XANAX) 0.5 MG tablet Take 0.5 mg by mouth 3 times daily as needed for Anxiety.     Historical Provider, MD   budesonide (PULMICORT) 0.5 MG/2ML nebulizer suspension Take 1 ampule by nebulization every 12 hours as needed (COPD)    Historical Provider, MD   ipratropium (ATROVENT) 0.02 % nebulizer solution Take 0.5 mg by nebulization 4 times daily as needed (wheezing or SOB)    Historical Provider, MD   aspirin 81 MG chewable tablet Take 81 mg by mouth daily    Historical Provider, MD   divalproex (DEPAKOTE) 125 MG DR tablet Take 125 mg by mouth 3 times daily     Historical Provider, MD   vitamin B-12 (CYANOCOBALAMIN) 500 MCG tablet Take 500 mcg by mouth daily    Historical Provider, MD   carvedilol (COREG) 6.25 MG tablet Take 6.25 mg by mouth 2 times daily (with meals)    Historical Provider, MD   gabapentin (NEURONTIN) 300 MG capsule Take 300 mg by mouth 2 times daily . Historical Provider, MD   carbidopa-levodopa (SINEMET)  MG per tablet Take 1 tablet by mouth 4 times daily     Historical Provider, MD   isosorbide mononitrate (IMDUR) 60 MG CR tablet Take 1 tablet by mouth daily. 3/25/15   Nathan Ceballos MD   nitroGLYCERIN (NITROSTAT) 0.4 MG SL tablet Place 1 tablet under the tongue every 5 minutes as needed for Chest pain. 12/18/13   Delane Dandy, MD        Allergies:     Naprosyn [naproxen]; Actonel [risedronate sodium]; Albuterol sulfate; Atenolol; Codeine; Cortisone; and Requip [ropinirole hcl]    Social History:     Tobacco:    reports that she has been smoking Cigarettes. She has been smoking about 0.50 packs per day. She has never used smokeless tobacco.  Alcohol:      reports that she does not drink alcohol. Drug Use:  reports that she does not use drugs. Family History:     Family History   Problem Relation Age of Onset    Kidney Disease Mother     Cancer Mother     Heart Disease Father     Coronary Art Dis Sister        Review of Systems:     Positive and Negative as described in HPI.     CONSTITUTIONAL:  negative for fevers, chills, sweats, fatigue, weight loss  HEENT:  negative for Ratio NOT REPORTED 9 - 20    Calcium 8.1 (L) 8.6 - 10.4 mg/dL    Sodium 139 135 - 144 mmol/L    Potassium 4.4 3.7 - 5.3 mmol/L    Chloride 101 98 - 107 mmol/L    CO2 31 20 - 31 mmol/L    Anion Gap 7 (L) 9 - 17 mmol/L    Alkaline Phosphatase 53 35 - 104 U/L    ALT <5 (L) 5 - 33 U/L    AST 14 <32 U/L    Total Bilirubin 0.25 (L) 0.3 - 1.2 mg/dL    Total Protein 6.4 6.4 - 8.3 g/dL    Alb 3.4 (L) 3.5 - 5.2 g/dL    Albumin/Globulin Ratio 1.1 1.0 - 2.5    GFR Non-African American 55 (L) >60 mL/min    GFR African American >60 >60 mL/min    GFR Comment          GFR Staging NOT REPORTED    Troponin    Collection Time: 08/24/18  5:53 AM   Result Value Ref Range    Troponin T <0.03 <0.03 ng/mL    Troponin Interp         Protime-INR    Collection Time: 08/24/18  5:53 AM   Result Value Ref Range    Protime 10.0 9.0 - 12.0 sec    INR 0.9    Ethanol    Collection Time: 08/24/18  5:53 AM   Result Value Ref Range    Ethanol <10 <10 mg/dL    Ethanol percent <0.010 %   Venous Blood Gas, POC    Collection Time: 08/24/18  5:53 AM   Result Value Ref Range    pH, Trevor 7.324 7.320 - 7.430    pCO2, Trevor 62.5 (H) 41.0 - 51.0 mm Hg    pO2, Trevor 41.5 30.0 - 50.0 mm Hg    HCO3, Venous 32.5 (H) 22.0 - 29.0 mmol/L    Total CO2, Venous 34 (H) 23.0 - 30.0 mmol/L    Negative Base Excess, Trevor NOT REPORTED 0.0 - 2.0    Positive Base Excess, Trevor 5 (H) 0.0 - 3.0    O2 Sat, Trevor 71 60.0 - 85.0 %    O2 Device/Flow/% NOT REPORTED     Yaw Test NOT REPORTED     Sample Site NOT REPORTED     Mode NOT REPORTED     FIO2 NOT REPORTED     Pt Temp NOT REPORTED     POC pH Temp NOT REPORTED     POC pCO2 Temp NOT REPORTED mm Hg    POC pO2 Temp NOT REPORTED mm Hg   Hemoglobin and hematocrit, blood    Collection Time: 08/24/18  5:53 AM   Result Value Ref Range    POC Hemoglobin 10.2 (L) 12.0 - 16.0 g/dL    POC Hematocrit 30 (L) 36 - 46 %   Creatinine W/GFR Point of Care    Collection Time: 08/24/18  5:53 AM   Result Value Ref Range    POC Creatinine 1.15 0.51 - 1.19 mg/dL GFR Comment 55 (L) >60 mL/min    GFR Non- 46 (L) >60 mL/min    GFR Comment         SODIUM (POC)    Collection Time: 08/24/18  5:53 AM   Result Value Ref Range    POC Sodium 142 138 - 146 mmol/L   POTASSIUM (POC)    Collection Time: 08/24/18  5:53 AM   Result Value Ref Range    POC Potassium 4.3 3.5 - 4.5 mmol/L   CHLORIDE (POC)    Collection Time: 08/24/18  5:53 AM   Result Value Ref Range    POC Chloride 103 98 - 107 mmol/L   CALCIUM, IONIC (POC)    Collection Time: 08/24/18  5:53 AM   Result Value Ref Range    POC Ionized Calcium 1.05 (L) 1.15 - 1.33 mmol/L   Lactic Acid, POC    Collection Time: 08/24/18  5:53 AM   Result Value Ref Range    POC Lactic Acid 0.92 0.56 - 1.39 mmol/L   POCT Glucose    Collection Time: 08/24/18  5:53 AM   Result Value Ref Range    POC Glucose 84 74 - 100 mg/dL   Anion Gap (Calc) POC    Collection Time: 08/24/18  5:53 AM   Result Value Ref Range    Anion Gap 7 7 - 16 mmol/L   Lactic Acid, Whole Blood    Collection Time: 08/24/18  5:53 AM   Result Value Ref Range    Lactic Acid, Whole Blood 1.1 0.7 - 2.1 mmol/L   Valproic acid level, total    Collection Time: 08/24/18  7:12 AM   Result Value Ref Range    Valproic Acid Lvl 20 (L) 50 - 125 ug/mL    Valproic Dose amount NOT REPORTED     Valproic Date last dose NOT REPORTED     Valproic Time last dose NOT REPORTED    AMMONIA    Collection Time: 08/24/18  7:12 AM   Result Value Ref Range    Ammonia <10 (L) 11 - 51 umol/L   Venous Blood Gas, POC    Collection Time: 08/24/18  7:12 AM   Result Value Ref Range    pH, Trevor 7.260 (L) 7.320 - 7.430    pCO2, Trevor 83.3 (HH) 41.0 - 51.0 mm Hg    pO2, Trevor 27.6 (L) 30.0 - 50.0 mm Hg    HCO3, Venous 37.3 (H) 22.0 - 29.0 mmol/L    Total CO2, Venous 40 (H) 23.0 - 30.0 mmol/L    Negative Base Excess, Trevor NOT REPORTED 0.0 - 2.0    Positive Base Excess, Trevor 8 (H) 0.0 - 3.0    O2 Sat, Trevor 40 (L) 60.0 - 85.0 %    O2 Device/Flow/% NOT REPORTED     Yaw Test NOT REPORTED     Sample Site NOT continue home dose  CRP, procalcitonin to evaluate forpossible infection  Restart AC for now till getting more info, plt and Hb stable  Continue O2 supplement  Monitor labs and vitals daily  Neuro consult  Carotid  A stenosis , vascular consulted last admission and decided  no vascular intervention   Continue chronic homemeds  DVT and GI PPX  Consultations:   IP CONSULT TO STROKE TEAM  IP CONSULT TO HOSPITALIST  IP CONSULT TO DIETITIAN     Patient is admitted as inpatient status because of co-morbidities listed above, severity of signs and symptoms as outlined, requirement for current medical therapies and most importantly because of direct risk to patient if care not provided in a hospital setting. Nicolle Adame MD  8/24/2018  11:36 PM    Copy sent to Dr. Dyson primary care provider on file. 49.8

## 2018-08-24 NOTE — CARE COORDINATION
Case Management Initial Discharge Plan  [place],         Readmission Risk              Risk of Unplanned Readmission:        24               Met with:patient to discuss discharge plans. Information verified: address, contacts, phone number, , insurance Yes  PCP: No primary care provider on file.   Date of last visit:     Insurance Provider: Cecille Bhakta    Discharge Planning    Living Arrangements:  Other (Comment)   Support Systems:  Family Members    Home has  stories   stairs to climb to get into front door, stairs to climb to reach second floor  Location of bedroom/bathroom in home at the [address] [facility]    Patient able to perform ADL's:Independent    Current Services (outpatient & in home) night time O2  DME equipment:   DME provider:     Pharmacy: [facility] Medications:  No  Does patient want to participate in local refill/ meds to beds program?  Yes    Potential Assistance Needed:  N/A    Patient agreeable to home care: No  [place] of choice provided:  no    Prior SNF/Rehab Placement and Facility: [facility], Current  Agreeable to SNF/Rehab: Yes  [place] of choice provided: yes   Evaluation: no    Expected Discharge date:  18  Patient expects to be discharged to:  back to [facility]  Follow Up Appointment: Best Day/ Time:      Transportation provider: Ambulance  Transportation arrangements needed for discharge: Yes, [facility]    Discharge Plan: return back to [facility] signed by Jadyn Orozco RN on 18 at 5:57 PM

## 2018-08-24 NOTE — ED PROVIDER NOTES
[address] ED  Emergency Department  Emergency Medicine Resident Sign-out     Care of Paddy Martinez was assumed from Dr. Gino Mcknight and is being seen for Respiratory Distress  . The patient's initial evaluation and plan have been discussed with the prior provider who initially evaluated the patient.      EMERGENCY DEPARTMENT COURSE / MEDICAL DECISION MAKING:       MEDICATIONS GIVEN:  Orders Placed This Encounter   Medications    iopamidol (ISOVUE-370) 76 % injection 90 mL       LABS / RADIOLOGY:     Labs Reviewed   CBC WITH AUTO DIFFERENTIAL - Abnormal; Notable for the following:        Result Value    WBC 2.8 (*)     RBC 3.15 (*)     Hemoglobin 10.3 (*)     Hematocrit 32.7 (*)     .8 (*)     RDW 17.1 (*)     Platelets 000 (*)     Monocytes 13 (*)     Segs Absolute 1.43 (*)     Absolute Lymph # 0.87 (*)     All other components within normal limits   COMPREHENSIVE METABOLIC PANEL W/ REFLEX TO MG FOR LOW K - Abnormal; Notable for the following:     BUN 26 (*)     CREATININE 0.99 (*)     Calcium 8.1 (*)     Anion Gap 7 (*)     ALT <5 (*)     Total Bilirubin 0.25 (*)     Alb 3.4 (*)     GFR Non- 55 (*)     All other components within normal limits   HGB/HCT - Abnormal; Notable for the following:     POC Hemoglobin 10.2 (*)     POC Hematocrit 30 (*)     All other components within normal limits   CALCIUM, IONIC (POC) - Abnormal; Notable for the following:     POC Ionized Calcium 1.05 (*)     All other components within normal limits   AMMONIA - Abnormal; Notable for the following:     Ammonia <10 (*)     All other components within normal limits   VENOUS BLOOD GAS, POINT OF CARE - Abnormal; Notable for the following:     pCO2, Trevor 62.5 (*)     HCO3, Venous 32.5 (*)     Total CO2, Venous 34 (*)     Positive Base Excess, Trevor 5 (*)     All other components within normal limits   CREATININE W/GFR POINT OF CARE - Abnormal; Notable for the following:     GFR Comment 55 (*)     GFR multifocal luminal irregularities of the right vertebral artery contributing to multifocal mild stenosis without occlusion. Left vertebral artery is patent with minimal atheromatous plaques of the V4 segment and origin. SOFT TISSUES: Emphysematous changes of the lung apices. Diminutive right thyroid lobe BONES: The visualized osseous structures appear unremarkable. CTA HEAD: ANTERIOR CIRCULATION: Carotid siphon calcifications without significant stenosis. The anterior cerebral and middle cerebral arteries appear patent. POSTERIOR CIRCULATION: The posterior cerebral arteries demonstrate no focal stenosis. The vertebral and basilar arteries appear unremarkable. BRAIN: No mass effect or midline shift. No abnormal extra-axial fluid collection. The gray-white differentiation appears grossly maintained. Nondominant right vertebral artery multifocal mild stenosis due to atheromatous plaques. Right internal carotid artery origin 70% stenosis by NASCET criteria. Patent left ICA. Intracranial arteries without acute findings. Findings were conveyed to Dr. Az Hill At 7:00 am on 8/24/2018. Cta Head W Contrast    Result Date: 8/24/2018  EXAMINATION: CTA OF THE HEAD WITH CONTRAST; CTA OF THE NECK 8/24/2018 6:36 am: TECHNIQUE: CTA of the head/brain was performed with the administration of intravenous contrast. Multiplanar reformatted images are provided for review. MIP images are provided for review. Dose modulation, iterative reconstruction, and/or weight based adjustment of the mA/kV was utilized to reduce the radiation dose to as low as reasonably achievable.; CTA of the neck was performed with the administration of intravenous contrast. Multiplanar reformatted images are provided for review. MIP images are provided for review. Stenosis of the internal carotid arteries measured using NASCET criteria.  Dose modulation, iterative reconstruction, and/or weight based adjustment of the mA/kV was utilized to reduce the radiation dose to as low as reasonably achievable. COMPARISON: None HISTORY: ORDERING SYSTEM PROVIDED HISTORY: stroke TECHNOLOGIST PROVIDED HISTORY: FINDINGS: CTA NECK: AORTIC ARCH/ARCH VESSELS: There is a normal branch pattern of the aortic arch. No significant stenosis is seen of the innominate artery or subclavian arteries. CAROTID ARTERIES: Right internal carotid artery mixed density plaques narrow the lumen to 1.4 mm, distally averaging 5 mm. Left internal carotid artery calcified plaques do not result in significant stenosis. The common carotid arteries demonstrate peripheral atheromatous plaques but remain patent. VERTEBRAL ARTERIES: There is multifocal luminal irregularities of the right vertebral artery contributing to multifocal mild stenosis without occlusion. Left vertebral artery is patent with minimal atheromatous plaques of the V4 segment and origin. SOFT TISSUES: Emphysematous changes of the lung apices. Diminutive right thyroid lobe BONES: The visualized osseous structures appear unremarkable. CTA HEAD: ANTERIOR CIRCULATION: Carotid siphon calcifications without significant stenosis. The anterior cerebral and middle cerebral arteries appear patent. POSTERIOR CIRCULATION: The posterior cerebral arteries demonstrate no focal stenosis. The vertebral and basilar arteries appear unremarkable. BRAIN: No mass effect or midline shift. No abnormal extra-axial fluid collection. The gray-white differentiation appears grossly maintained. Nondominant right vertebral artery multifocal mild stenosis due to atheromatous plaques. Right internal carotid artery origin 70% stenosis by NASCET criteria. Patent left ICA. Intracranial arteries without acute findings. Findings were conveyed to Dr. Matthew Garza At 7:00 am on 8/24/2018. RECENT VITALS:     Temp: 97.2 °F (36.2 °C),  Pulse: 73, Resp: 14, BP: (!) 159/53, SpO2: 100 %    This patient is a 70 y.o.  Female with altered mental status stroke alert was called

## 2018-08-24 NOTE — ED PROVIDER NOTES
[facility] ED  Emergency Department  Faculty Sign-Out Addendum     Care of Trung Tavera was assumed from previous attending and is being seen for Respiratory Distress  . The patient's initial evaluation and plan have been discussed with the prior provider who initially evaluated the patient.       EMERGENCY DEPARTMENT COURSE / MEDICAL DECISION MAKING:       MEDICATIONS GIVEN:  Orders Placed This Encounter   Medications    iopamidol (ISOVUE-370) 76 % injection 90 mL    divalproex (DEPAKOTE) DR tablet 125 mg    vitamin B-12 (CYANOCOBALAMIN) tablet 500 mcg    carvedilol (COREG) tablet 6.25 mg    gabapentin (NEURONTIN) capsule 300 mg    carbidopa-levodopa (SINEMET)  MG per tablet 1 tablet    amLODIPine (NORVASC) tablet 5 mg    levothyroxine (SYNTHROID) tablet 25 mcg    DULoxetine (CYMBALTA) extended release capsule 90 mg    aspirin chewable tablet 81 mg    pantoprazole (PROTONIX) tablet 40 mg    LORazepam (ATIVAN) injection 1 mg    gadoteridol (PROHANCE) injection 20 mL    sodium chloride flush 0.9 % injection 10 mL       LABS / RADIOLOGY:     Labs Reviewed   CBC WITH AUTO DIFFERENTIAL - Abnormal; Notable for the following:        Result Value    WBC 2.8 (*)     RBC 3.15 (*)     Hemoglobin 10.3 (*)     Hematocrit 32.7 (*)     .8 (*)     RDW 17.1 (*)     Platelets 985 (*)     Monocytes 13 (*)     Segs Absolute 1.43 (*)     Absolute Lymph # 0.87 (*)     All other components within normal limits   COMPREHENSIVE METABOLIC PANEL W/ REFLEX TO MG FOR LOW K - Abnormal; Notable for the following:     BUN 26 (*)     CREATININE 0.99 (*)     Calcium 8.1 (*)     Anion Gap 7 (*)     ALT <5 (*)     Total Bilirubin 0.25 (*)     Alb 3.4 (*)     GFR Non- 55 (*)     All other components within normal limits   URINALYSIS - Abnormal; Notable for the following:     Specific Gravity, UA 1.034 (*)     All other components within normal limits   URINE DRUG SCREEN - Abnormal; VESSELS: There is a normal branch pattern of the aortic arch. No significant stenosis is seen of the innominate artery or subclavian arteries. CAROTID ARTERIES: Right internal carotid artery mixed density plaques narrow the lumen to 1.4 mm, distally averaging 5 mm. Left internal carotid artery calcified plaques do not result in significant stenosis. The common carotid arteries demonstrate peripheral atheromatous plaques but remain patent. VERTEBRAL ARTERIES: There is multifocal luminal irregularities of the right vertebral artery contributing to multifocal mild stenosis without occlusion. Left vertebral artery is patent with minimal atheromatous plaques of the V4 segment and origin. SOFT TISSUES: Emphysematous changes of the lung apices. Diminutive right thyroid lobe BONES: The visualized osseous structures appear unremarkable. CTA HEAD: ANTERIOR CIRCULATION: Carotid siphon calcifications without significant stenosis. The anterior cerebral and middle cerebral arteries appear patent. POSTERIOR CIRCULATION: The posterior cerebral arteries demonstrate no focal stenosis. The vertebral and basilar arteries appear unremarkable. BRAIN: No mass effect or midline shift. No abnormal extra-axial fluid collection. The gray-white differentiation appears grossly maintained. Nondominant right vertebral artery multifocal mild stenosis due to atheromatous plaques. Right internal carotid artery origin 70% stenosis by NASCET criteria. Patent left ICA. Intracranial arteries without acute findings. Findings were conveyed to Dr. Tiffanie Crowley At 7:00 am on 8/24/2018. Mri Brain W Wo Contrast    Result Date: 8/24/2018  EXAMINATION: MRI OF THE BRAIN WITHOUT AND WITH CONTRAST  8/24/2018 2:25 pm TECHNIQUE: Multiplanar multisequence MRI of the head/brain was performed without and with the administration of intravenous contrast. COMPARISON: 05/07/2018. HISTORY: ORDERING SYSTEM PROVIDED HISTORY: seizure Initial evaluation.  FINDINGS: Motion

## 2018-08-25 ENCOUNTER — APPOINTMENT (OUTPATIENT)
Dept: ULTRASOUND IMAGING | Age: 78
DRG: 189 | End: 2018-08-25
Payer: MEDICARE

## 2018-08-25 ENCOUNTER — APPOINTMENT (OUTPATIENT)
Dept: CT IMAGING | Age: 78
DRG: 189 | End: 2018-08-25
Payer: MEDICARE

## 2018-08-25 LAB
ABSOLUTE EOS #: 0.1 K/UL (ref 0–0.44)
ABSOLUTE IMMATURE GRANULOCYTE: <0.03 K/UL (ref 0–0.3)
ABSOLUTE LYMPH #: 0.94 K/UL (ref 1.1–3.7)
ABSOLUTE MONO #: 0.27 K/UL (ref 0.1–1.2)
ABSOLUTE RETIC #: 0.14 M/UL (ref 0.03–0.08)
ALLEN TEST: ABNORMAL
ANION GAP SERPL CALCULATED.3IONS-SCNC: 9 MMOL/L (ref 9–17)
BASOPHILS # BLD: 1 % (ref 0–2)
BASOPHILS ABSOLUTE: <0.03 K/UL (ref 0–0.2)
BUN BLDV-MCNC: 17 MG/DL (ref 8–23)
BUN/CREAT BLD: ABNORMAL (ref 9–20)
C-REACTIVE PROTEIN: 2.6 MG/L (ref 0–5)
CALCIUM SERPL-MCNC: 8.1 MG/DL (ref 8.6–10.4)
CARBOXYHEMOGLOBIN: 1.8 % (ref 0–5)
CHLORIDE BLD-SCNC: 101 MMOL/L (ref 98–107)
CO2: 31 MMOL/L (ref 20–31)
CREAT SERPL-MCNC: 0.66 MG/DL (ref 0.5–0.9)
DIFFERENTIAL TYPE: ABNORMAL
EOSINOPHILS RELATIVE PERCENT: 4 % (ref 1–4)
FERRITIN: 196 UG/L (ref 13–150)
FIO2: ABNORMAL
FREE KAPPA/LAMBDA RATIO: 2.67 (ref 0.26–1.65)
GFR AFRICAN AMERICAN: >60 ML/MIN
GFR NON-AFRICAN AMERICAN: >60 ML/MIN
GFR SERPL CREATININE-BSD FRML MDRD: ABNORMAL ML/MIN/{1.73_M2}
GFR SERPL CREATININE-BSD FRML MDRD: ABNORMAL ML/MIN/{1.73_M2}
GLUCOSE BLD-MCNC: 88 MG/DL (ref 70–99)
HCO3 VENOUS: 34.1 MMOL/L (ref 24–30)
HCT VFR BLD CALC: 32.9 % (ref 36.3–47.1)
HEMOGLOBIN: 10.4 G/DL (ref 11.9–15.1)
IMMATURE GRANULOCYTES: 0 %
IMMATURE RETIC FRACT: 17.8 % (ref 2.7–18.3)
IRON SATURATION: 23 % (ref 20–55)
IRON: 59 UG/DL (ref 37–145)
KAPPA FREE LIGHT CHAINS QNT: 6.86 MG/DL (ref 0.37–1.94)
LAMBDA FREE LIGHT CHAINS QNT: 2.57 MG/DL (ref 0.57–2.63)
LYMPHOCYTES # BLD: 34 % (ref 24–43)
MCH RBC QN AUTO: 31.9 PG (ref 25.2–33.5)
MCHC RBC AUTO-ENTMCNC: 31.6 G/DL (ref 28.4–34.8)
MCV RBC AUTO: 100.9 FL (ref 82.6–102.9)
METHEMOGLOBIN: ABNORMAL % (ref 0–1.5)
MODE: ABNORMAL
MONOCYTES # BLD: 10 % (ref 3–12)
NEGATIVE BASE EXCESS, VEN: ABNORMAL MMOL/L (ref 0–2)
NOTIFICATION TIME: ABNORMAL
NOTIFICATION: ABNORMAL
NRBC AUTOMATED: 0 PER 100 WBC
O2 DEVICE/FLOW/%: ABNORMAL
O2 SAT, VEN: 58.6 % (ref 60–85)
OXYHEMOGLOBIN: ABNORMAL % (ref 95–98)
PATIENT TEMP: 37
PCO2, VEN, TEMP ADJ: ABNORMAL MMHG (ref 39–55)
PCO2, VEN: 68.8 (ref 39–55)
PDW BLD-RTO: 16.5 % (ref 11.8–14.4)
PEEP/CPAP: ABNORMAL
PH VENOUS: 7.32 (ref 7.32–7.42)
PH, VEN, TEMP ADJ: ABNORMAL (ref 7.32–7.42)
PLATELET # BLD: 121 K/UL (ref 138–453)
PLATELET ESTIMATE: ABNORMAL
PMV BLD AUTO: 10.6 FL (ref 8.1–13.5)
PO2, VEN, TEMP ADJ: ABNORMAL MMHG (ref 30–50)
PO2, VEN: 35.5 (ref 30–50)
POSITIVE BASE EXCESS, VEN: 6.7 MMOL/L (ref 0–2)
POTASSIUM SERPL-SCNC: 4.3 MMOL/L (ref 3.7–5.3)
PROCALCITONIN: 0.02 NG/ML
PSV: ABNORMAL
PT. POSITION: ABNORMAL
RBC # BLD: 3.26 M/UL (ref 3.95–5.11)
RBC # BLD: ABNORMAL 10*6/UL
RESPIRATORY RATE: ABNORMAL
RETIC %: 4.3 % (ref 0.5–1.9)
RETIC HEMOGLOBIN: 35.6 PG (ref 28.2–35.7)
SAMPLE SITE: ABNORMAL
SEG NEUTROPHILS: 51 % (ref 36–65)
SEGMENTED NEUTROPHILS ABSOLUTE COUNT: 1.45 K/UL (ref 1.5–8.1)
SET RATE: ABNORMAL
SODIUM BLD-SCNC: 141 MMOL/L (ref 135–144)
TEXT FOR RESPIRATORY: ABNORMAL
TOTAL HB: ABNORMAL G/DL (ref 12–16)
TOTAL IRON BINDING CAPACITY: 261 UG/DL (ref 250–450)
TOTAL RATE: ABNORMAL
UNSATURATED IRON BINDING CAPACITY: 202 UG/DL (ref 112–347)
VT: ABNORMAL
WBC # BLD: 2.8 K/UL (ref 3.5–11.3)
WBC # BLD: ABNORMAL 10*3/UL

## 2018-08-25 PROCEDURE — 74177 CT ABD & PELVIS W/CONTRAST: CPT

## 2018-08-25 PROCEDURE — 85045 AUTOMATED RETICULOCYTE COUNT: CPT

## 2018-08-25 PROCEDURE — 99232 SBSQ HOSP IP/OBS MODERATE 35: CPT | Performed by: FAMILY MEDICINE

## 2018-08-25 PROCEDURE — 83540 ASSAY OF IRON: CPT

## 2018-08-25 PROCEDURE — 82805 BLOOD GASES W/O2 SATURATION: CPT

## 2018-08-25 PROCEDURE — G8978 MOBILITY CURRENT STATUS: HCPCS

## 2018-08-25 PROCEDURE — 80048 BASIC METABOLIC PNL TOTAL CA: CPT

## 2018-08-25 PROCEDURE — 82728 ASSAY OF FERRITIN: CPT

## 2018-08-25 PROCEDURE — 94762 N-INVAS EAR/PLS OXIMTRY CONT: CPT

## 2018-08-25 PROCEDURE — 6370000000 HC RX 637 (ALT 250 FOR IP): Performed by: FAMILY MEDICINE

## 2018-08-25 PROCEDURE — 6370000000 HC RX 637 (ALT 250 FOR IP): Performed by: PSYCHIATRY & NEUROLOGY

## 2018-08-25 PROCEDURE — 83550 IRON BINDING TEST: CPT

## 2018-08-25 PROCEDURE — 86140 C-REACTIVE PROTEIN: CPT

## 2018-08-25 PROCEDURE — 6370000000 HC RX 637 (ALT 250 FOR IP): Performed by: NURSE PRACTITIONER

## 2018-08-25 PROCEDURE — 6360000004 HC RX CONTRAST MEDICATION: Performed by: FAMILY MEDICINE

## 2018-08-25 PROCEDURE — 1200000000 HC SEMI PRIVATE

## 2018-08-25 PROCEDURE — 85027 COMPLETE CBC AUTOMATED: CPT

## 2018-08-25 PROCEDURE — 2580000003 HC RX 258: Performed by: STUDENT IN AN ORGANIZED HEALTH CARE EDUCATION/TRAINING PROGRAM

## 2018-08-25 PROCEDURE — 76705 ECHO EXAM OF ABDOMEN: CPT

## 2018-08-25 PROCEDURE — 36415 COLL VENOUS BLD VENIPUNCTURE: CPT

## 2018-08-25 PROCEDURE — 94640 AIRWAY INHALATION TREATMENT: CPT

## 2018-08-25 PROCEDURE — G8979 MOBILITY GOAL STATUS: HCPCS

## 2018-08-25 RX ORDER — ACETAMINOPHEN 325 MG/1
650 TABLET ORAL EVERY 4 HOURS PRN
Status: DISCONTINUED | OUTPATIENT
Start: 2018-08-25 | End: 2018-08-28 | Stop reason: HOSPADM

## 2018-08-25 RX ADMIN — MOMETASONE FUROATE AND FORMOTEROL FUMARATE DIHYDRATE 2 PUFF: 200; 5 AEROSOL RESPIRATORY (INHALATION) at 19:31

## 2018-08-25 RX ADMIN — Medication 500 MCG: at 09:08

## 2018-08-25 RX ADMIN — CARBIDOPA AND LEVODOPA 1 TABLET: 25; 100 TABLET ORAL at 21:21

## 2018-08-25 RX ADMIN — DULOXETINE HYDROCHLORIDE 90 MG: 30 CAPSULE, DELAYED RELEASE ORAL at 09:08

## 2018-08-25 RX ADMIN — CLOPIDOGREL 75 MG: 75 TABLET, FILM COATED ORAL at 09:08

## 2018-08-25 RX ADMIN — DIVALPROEX SODIUM 125 MG: 125 TABLET, DELAYED RELEASE ORAL at 21:21

## 2018-08-25 RX ADMIN — MOMETASONE FUROATE AND FORMOTEROL FUMARATE DIHYDRATE 2 PUFF: 200; 5 AEROSOL RESPIRATORY (INHALATION) at 09:10

## 2018-08-25 RX ADMIN — PANTOPRAZOLE SODIUM 40 MG: 40 TABLET, DELAYED RELEASE ORAL at 09:09

## 2018-08-25 RX ADMIN — CARBIDOPA AND LEVODOPA 1 TABLET: 25; 100 TABLET ORAL at 09:09

## 2018-08-25 RX ADMIN — Medication 10 ML: at 09:08

## 2018-08-25 RX ADMIN — ALPRAZOLAM 0.5 MG: 0.5 TABLET ORAL at 21:21

## 2018-08-25 RX ADMIN — ISOSORBIDE MONONITRATE 60 MG: 60 TABLET ORAL at 09:08

## 2018-08-25 RX ADMIN — CARBIDOPA AND LEVODOPA 1 TABLET: 25; 100 TABLET ORAL at 17:16

## 2018-08-25 RX ADMIN — IOPAMIDOL 75 ML: 755 INJECTION, SOLUTION INTRAVENOUS at 13:20

## 2018-08-25 RX ADMIN — Medication 10 ML: at 21:22

## 2018-08-25 RX ADMIN — APIXABAN 5 MG: 5 TABLET, FILM COATED ORAL at 10:43

## 2018-08-25 RX ADMIN — GABAPENTIN 300 MG: 300 CAPSULE ORAL at 09:08

## 2018-08-25 RX ADMIN — CARBIDOPA AND LEVODOPA 1 TABLET: 25; 100 TABLET ORAL at 13:52

## 2018-08-25 RX ADMIN — ASPIRIN 81 MG 81 MG: 81 TABLET ORAL at 09:09

## 2018-08-25 RX ADMIN — CARVEDILOL 6.25 MG: 12.5 TABLET, FILM COATED ORAL at 09:08

## 2018-08-25 RX ADMIN — DIVALPROEX SODIUM 125 MG: 125 TABLET, DELAYED RELEASE ORAL at 13:51

## 2018-08-25 RX ADMIN — AMLODIPINE BESYLATE 5 MG: 10 TABLET ORAL at 09:09

## 2018-08-25 RX ADMIN — GABAPENTIN 300 MG: 300 CAPSULE ORAL at 21:21

## 2018-08-25 RX ADMIN — ACETAMINOPHEN 650 MG: 325 TABLET ORAL at 22:39

## 2018-08-25 RX ADMIN — DIVALPROEX SODIUM 125 MG: 125 TABLET, DELAYED RELEASE ORAL at 09:08

## 2018-08-25 RX ADMIN — CLOPIDOGREL BISULFATE 300 MG: 75 TABLET ORAL at 01:14

## 2018-08-25 RX ADMIN — CARVEDILOL 6.25 MG: 12.5 TABLET, FILM COATED ORAL at 17:16

## 2018-08-25 RX ADMIN — LEVOTHYROXINE SODIUM 25 MCG: 25 TABLET ORAL at 06:50

## 2018-08-25 RX ADMIN — APIXABAN 5 MG: 5 TABLET, FILM COATED ORAL at 21:21

## 2018-08-25 ASSESSMENT — PAIN DESCRIPTION - ONSET: ONSET: ON-GOING

## 2018-08-25 ASSESSMENT — PAIN SCALES - GENERAL
PAINLEVEL_OUTOF10: 0
PAINLEVEL_OUTOF10: 5
PAINLEVEL_OUTOF10: 3

## 2018-08-25 ASSESSMENT — PAIN DESCRIPTION - FREQUENCY: FREQUENCY: CONTINUOUS

## 2018-08-25 ASSESSMENT — PAIN DESCRIPTION - PAIN TYPE: TYPE: ACUTE PAIN

## 2018-08-25 ASSESSMENT — PAIN DESCRIPTION - DESCRIPTORS: DESCRIPTORS: HEADACHE

## 2018-08-25 ASSESSMENT — PAIN DESCRIPTION - LOCATION: LOCATION: OTHER (COMMENT)

## 2018-08-25 NOTE — CONSULTS
Today's Date: 8/25/2018  Patient Name: Valentina Cruz  Date of admission: 8/24/2018  5:41 AM  Patient's age: 70 y.o., 1940  Admission Dx: Freddie Stockton episode [R41.89]    Reason for Consult: management recommendations  Requesting Physician: Philip Orozco MD    CHIEF COMPLAINT:  Unresponsive, pancytopenia    History Obtained From:  patient, electronic medical record    HISTORY OF PRESENT ILLNESS:      The patient is a 70 y.o.  female who is admitted to the hospital for chief complaints of respiratory distress and unresponsiveness. She is a nursing home resident and was found unresponsive when she went out for smoke. Patient was admitted to [facility] and worked up for stroke. Hematology oncology team is consulted due to findings of pancytopenia. Review of patient's records revealed that patient has had long-standing history of pancytopenia. Patient has history of multiple cancers. She was diagnosed with breast cancer in 1993 for which she received chemotherapy. Patient also has history of early stage non-small cell lung cancer for which she underwent resection. 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 Patient's last CT PET from May 2018 showed a PET avid lung nodule which is being monitored. 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 Patient follows up with her oncologist Dr. Patsy Myles as outpatient. Workup done so far includes unremarkable flow cytometry. Ultrasound of abdomen and spleen showed findings concerning for liver disease as well as splenomegaly. Additionally CT scan done in the past showed hypodense splenic lesion. Past Medical History:   has a past medical history of Airway obstruction; Alcoholic polyneuropathy ([address] Angina effort ([address] Anxiety; Atrial fibrillation ([address] CAD (coronary artery disease);  Cancer [facility]); CHF (congestive heart failure) ([address] Convulsions ([facility] COPD (chronic obstructive pulmonary disease) ([facility] Degenerative disc disease; Depression; Depression; Diabetes mellitus ([facility] Esophageal reflux; Hypertension; Inflammatory spondylopathy ([facility] Iron deficiency; Lymphoma ([facility] MDRO (multiple drug resistant organisms) resistance; MRSA (methicillin resistant staph aureus) culture positive; Neuropathy; Osteoarthritis; Parkinson's disease ([facility] Peripheral vascular disease ([facility] Restless leg syndrome; Sleep apnea; Transient cerebral ischemia; and Unsteady gait. Past Surgical History:   has a past surgical history that includes Cholecystectomy; Hysterectomy; Lung removal, partial; Breast lumpectomy; Coronary angioplasty with stent; Femoral-femoral Bypass Graft; and bone marrow biopsy (11/29/2017). Medications:    Prior to Admission medications    Medication Sig Start Date End Date Taking? Authorizing Provider   mometasone-formoterol (DULERA) 200-5 MCG/ACT inhaler Inhale 2 puffs into the lungs 2 times daily    Historical Provider, MD   pantoprazole (PROTONIX) 40 MG tablet Take 40 mg by mouth daily    Historical Provider, MD   amLODIPine (NORVASC) 5 MG tablet Take 5 mg by mouth daily    Historical Provider, MD   levothyroxine (SYNTHROID) 25 MCG tablet Take 25 mcg by mouth Daily    Historical Provider, MD   polyethylene glycol (MIRALAX) powder Take 17 g by mouth daily as needed     Historical Provider, MD   DULOXETINE HCL PO Take 90 mg by mouth daily Takes 30mg + 60mg caps (=90mg) once daily    Historical Provider, MD   acetaminophen (TYLENOL) 325 MG tablet Take 650 mg by mouth every 6 hours as needed for Pain    Historical Provider, MD   ALPRAZolam (XANAX) 0.5 MG tablet Take 0.5 mg by mouth 3 times daily as needed for Anxiety.     Historical Provider, MD   budesonide (PULMICORT) 0.5 MG/2ML nebulizer suspension Take 1 ampule by nebulization every 12 hours as needed (COPD)    Historical Provider, MD   ipratropium (ATROVENT) 0.02 % nebulizer disorder noted   Musculoskeletal - no joint tenderness, deformity or swelling   Extremities - peripheral pulses normal, no pedal edema, no clubbing or cyanosis   Skin - normal coloration and turgor, no rashes, no suspicious skin lesions noted ,      DATA:      Labs:     CBC:   Recent Labs      08/24/18   0553  08/25/18 0524   WBC  2.8*  2.8*   HGB  10.3*  10.4*   HCT  32.7*  32.9*   PLT  115*  121*     BMP:   Recent Labs      08/24/18   0553  08/24/18   0712  08/25/18 0524   NA  139   --   141   K  4.4   --   4.3   CO2  31   --   31   BUN  26*   --   17   CREATININE  0.99*  1.15  Result not available. 0.66   LABGLOM  55*  46*  CANNOT BE CALCULATED  >60   GLUCOSE  88   --   88     PT/INR:   Recent Labs      08/24/18 0553   PROTIME  10.0   INR  0.9     APTT:No results for input(s): APTT in the last 72 hours.   LIVER PROFILE:  Recent Labs      08/24/18 0553   AST  14   ALT  <5*   LABALBU  3.4*     Results for orders placed or performed during the hospital encounter of 08/24/18   CBC auto differential   Result Value Ref Range    WBC 2.8 (L) 3.5 - 11.3 k/uL    RBC 3.15 (L) 3.95 - 5.11 m/uL    Hemoglobin 10.3 (L) 11.9 - 15.1 g/dL    Hematocrit 32.7 (L) 36.3 - 47.1 %    .8 (H) 82.6 - 102.9 fL    MCH 32.7 25.2 - 33.5 pg    MCHC 31.5 28.4 - 34.8 g/dL    RDW 17.1 (H) 11.8 - 14.4 %    Platelets 137 (L) 595 - 453 k/uL    MPV 10.7 8.1 - 13.5 fL    NRBC Automated 0.0 0.0 per 100 WBC    Differential Type NOT REPORTED     Seg Neutrophils 51 36 - 65 %    Lymphocytes 31 24 - 43 %    Monocytes 13 (H) 3 - 12 %    Eosinophils % 4 1 - 4 %    Basophils 1 0 - 2 %    Immature Granulocytes 0 0 %    Segs Absolute 1.43 (L) 1.50 - 8.10 k/uL    Absolute Lymph # 0.87 (L) 1.10 - 3.70 k/uL    Absolute Mono # 0.38 0.10 - 1.20 k/uL    Absolute Eos # 0.12 0.00 - 0.44 k/uL    Basophils # 0.03 0.00 - 0.20 k/uL    Absolute Immature Granulocyte <0.03 0.00 - 0.30 k/uL    WBC Morphology NOT REPORTED     RBC Morphology ANISOCYTOSIS PRESENT Cannabinoid Scrn, Ur NEGATIVE NEG    Oxycodone Screen, Ur NEGATIVE NEG    Methamphetamine, Urine NOT REPORTED NEG    Tricyclic Antidepressants, Urine NOT REPORTED NEG    MDMA, Urine NOT REPORTED NEG    Buprenorphine Urine NOT REPORTED NEG    Test Information       Assay provides medical screening only.   The absence of expected drug(s) and/or   Hemoglobin and hematocrit, blood   Result Value Ref Range    POC Hemoglobin 10.2 (L) 12.0 - 16.0 g/dL    POC Hematocrit 30 (L) 36 - 46 %   SODIUM (POC)   Result Value Ref Range    POC Sodium 142 138 - 146 mmol/L   POTASSIUM (POC)   Result Value Ref Range    POC Potassium 4.3 3.5 - 4.5 mmol/L   CHLORIDE (POC)   Result Value Ref Range    POC Chloride 103 98 - 107 mmol/L   CALCIUM, IONIC (POC)   Result Value Ref Range    POC Ionized Calcium 1.05 (L) 1.15 - 1.33 mmol/L   Lactic Acid, Whole Blood   Result Value Ref Range    Lactic Acid, Whole Blood 1.1 0.7 - 2.1 mmol/L   Valproic acid level, total   Result Value Ref Range    Valproic Acid Lvl 20 (L) 50 - 125 ug/mL    Valproic Dose amount NOT REPORTED     Valproic Date last dose NOT REPORTED     Valproic Time last dose NOT REPORTED    AMMONIA   Result Value Ref Range    Ammonia <10 (L) 11 - 51 umol/L   Hemoglobin and hematocrit, blood   Result Value Ref Range    POC Hemoglobin 10.0 (L) 12.0 - 16.0 g/dL    POC Hematocrit 29 (L) 36 - 46 %   SODIUM (POC)   Result Value Ref Range    POC Sodium 142 138 - 146 mmol/L   POTASSIUM (POC)   Result Value Ref Range    POC Potassium 4.4 3.5 - 4.5 mmol/L   CHLORIDE (POC)   Result Value Ref Range    POC Chloride 100 98 - 107 mmol/L   CALCIUM, IONIC (POC)   Result Value Ref Range    POC Ionized Calcium 1.14 (L) 1.15 - 1.33 mmol/L   Basic Metabolic Panel w/ Reflex to MG   Result Value Ref Range    Glucose 88 70 - 99 mg/dL    BUN 17 8 - 23 mg/dL    CREATININE 0.66 0.50 - 0.90 mg/dL    Bun/Cre Ratio NOT REPORTED 9 - 20    Calcium 8.1 (L) 8.6 - 10.4 mg/dL    Sodium 141 135 - 144 mmol/L    Potassium 4.3 >4.8 ng/mL   Electrophoresis Protein, Serum without Reflex to Immunofixation   Result Value Ref Range    Total Protein 6.2 (L) 6.4 - 8.3 g/dL    Albumin (calculated) Pending g/dL    Albumin % Pending %    Alpha-1-Globulin Pending g/dL    Alpha 1 % Pending %    Alpha-2-Globulin Pending g/dL    Alpha 2 % Pending %    Beta Globulin Pending g/dL    Beta Percent Pending %    Gamma Globulin Pending g/dL    Gamma Globulin % Pending %    Total Prot. Sum Pending g/dL    Total Prot.  Sum,% Pending %    Protein Electrophoresis, Serum Pending     Pathologist Pending    Kappa/Lambda Free Lt Chains, Serum Quant   Result Value Ref Range    Kappa Free Light Chains QNT 6.86 (H) 0.37 - 1.94 mg/dL    Lambda Free Light Chains QNT 2.57 0.57 - 2.63 mg/dL    Free Kappa/Lambda Ratio 2.67 (H) 0.26 - 1.65   Differential   Result Value Ref Range    Differential Type NOT REPORTED     Seg Neutrophils 51 36 - 65 %    Lymphocytes 34 24 - 43 %    Monocytes 10 3 - 12 %    Eosinophils % 4 1 - 4 %    Basophils 1 0 - 2 %    Immature Granulocytes 0 0 %    Segs Absolute 1.45 (L) 1.50 - 8.10 k/uL    Absolute Lymph # 0.94 (L) 1.10 - 3.70 k/uL    Absolute Mono # 0.27 0.10 - 1.20 k/uL    Absolute Eos # 0.10 0.00 - 0.44 k/uL    Basophils # <0.03 0.00 - 0.20 k/uL    Absolute Immature Granulocyte <0.03 0.00 - 0.30 k/uL    WBC Morphology NOT REPORTED     RBC Morphology ANISOCYTOSIS PRESENT     Platelet Estimate NOT REPORTED    Reticulocytes   Result Value Ref Range    Retic % 4.3 (H) 0.5 - 1.9 %    Absolute Retic # 0.140 (H) 0.030 - 0.080 M/uL    Immature Retic Fract 17.800 2.7 - 18.3 %    Retic Hemoglobin 35.6 28.2 - 35.7 pg   Iron and TIBC   Result Value Ref Range    Iron 59 37 - 145 ug/dL    TIBC 261 250 - 450 ug/dL    Iron Saturation 23 20 - 55 %    UIBC 202 112 - 347 ug/dL   Ferritin   Result Value Ref Range    Ferritin 196 (H) 13 - 150 ug/L   Venous Blood Gas, POC   Result Value Ref Range    pH, Trevor 7.324 7.320 - 7.430    pCO2, Trevor 62.5 (H) 41.0 - 51.0 mm mL/min    GFR Non- CANNOT BE CALCULATED >60 mL/min    GFR Comment         Lactic Acid, POC   Result Value Ref Range    POC Lactic Acid 0.64 0.56 - 1.39 mmol/L   POCT Glucose   Result Value Ref Range    POC Glucose 76 74 - 100 mg/dL   Anion Gap (Calc) POC   Result Value Ref Range    Anion Gap 5 (L) 7 - 16 mmol/L   EKG 12 lead   Result Value Ref Range    Ventricular Rate 80 BPM    Atrial Rate 80 BPM    P-R Interval 138 ms    QRS Duration 78 ms    Q-T Interval 360 ms    QTc Calculation (Bazett) 415 ms    P Axis 74 degrees    R Axis 75 degrees    T Axis 80 degrees         IMAGING DATA:      Ct Head Wo Contrast    Result Date: 8/24/2018  EXAMINATION: CT OF THE HEAD WITHOUT CONTRAST  8/24/2018 6:08 am TECHNIQUE: CT of the head was performed without the administration of intravenous contrast. Dose modulation, iterative reconstruction, and/or weight based adjustment of the mA/kV was utilized to reduce the radiation dose to as low as reasonably achievable. COMPARISON: May 5, 2018 HISTORY: ORDERING SYSTEM PROVIDED HISTORY: MENTAL STATUS CHANGE (AFTER TRAUMA) TECHNOLOGIST PROVIDED HISTORY: FINDINGS: BRAIN/VENTRICLES: There is no acute intracranial hemorrhage, mass effect or midline shift. The gray-white differentiation is maintained without evidence of an acute infarct. There is no evidence of hydrocephalus. Right greater than left basal ganglia and cerebellar chronic lacunar infarcts present. Patchy cerebral white matter hypoattenuation. Prominent extra-axial spaces along the biparietal convexity again noted. ORBITS: The visualized portion of the orbits demonstrate no acute abnormality. SINUSES: The visualized paranasal sinuses and mastoid air cells demonstrate no acute abnormality. SOFT TISSUES/SKULL:  No acute abnormality of the visualized skull or soft tissues. No acute intracranial abnormality. Stable chronic microangiopathic ischemic changes and scattered chronic lacunar infarcts.  Findings were MDS is progressing. Will order workup to rule out reversible causes. We will order a peripheral smear to rule out peripheral blasts. We'll also obtain ultrasound of spleen to for progression of splenomegaly and hyperdense lesion noted on the CT scan. We'll also check for paraproteinemia  Continue monitoring for pet avid lung lesion  Do not recommen d gs csf at this point       Thank you for asking us to see this patient. Jesica Benjamin MD        This note is created with the assistance of a speech recognition program.  While intending to generate a document that actually reflects the content of the visit, the document can still have some errors including those of syntax and sound a like substitutions which may escape proof reading. It such instances, actual meaning can be extrapolated by contextual diversion.

## 2018-08-25 NOTE — PLAN OF CARE
Problem: Pain:  Goal: Pain level will decrease  Pain level will decrease   Outcome: Met This Shift    Goal: Control of acute pain  Control of acute pain   Outcome: Met This Shift      Problem: Risk for Impaired Skin Integrity  Goal: Tissue integrity - skin and mucous membranes  Structural intactness and normal physiological function of skin and  mucous membranes.    Outcome: Met This Shift      Problem: Falls - Risk of:  Goal: Will remain free from falls  Will remain free from falls   Outcome: Met This Shift    Goal: Absence of physical injury  Absence of physical injury   Outcome: Met This Shift

## 2018-08-25 NOTE — PROGRESS NOTES
exertion, hemoptysis, shortness of breath, wheezing  Cardiovascular:  negative for chest pain, chest pressure/discomfort, lower extremity edema, palpitations  Gastrointestinal:  ++ abdominal pain, constipation, diarrhea, nausea, vomiting  Neurological:  negative for dizziness, headache    Medications: Allergies: Allergies   Allergen Reactions    Naprosyn [Naproxen] Hives    Actonel [Risedronate Sodium]     Albuterol Sulfate     Atenolol     Codeine Other (See Comments)     unknown reaction    Cortisone     Requip [Ropinirole Hcl]        Current Meds:   Scheduled Meds:    apixaban  5 mg Oral BID    isosorbide mononitrate  60 mg Oral Daily    divalproex  125 mg Oral TID    vitamin B-12  500 mcg Oral Daily    carvedilol  6.25 mg Oral BID WC    gabapentin  300 mg Oral BID    carbidopa-levodopa  1 tablet Oral 4x Daily    amLODIPine  5 mg Oral Daily    levothyroxine  25 mcg Oral Daily    DULoxetine  90 mg Oral Daily    aspirin  81 mg Oral Daily    mometasone-formoterol  2 puff Inhalation BID    pantoprazole  40 mg Oral Daily    sodium chloride flush  10 mL Intravenous BID    pneumococcal 13-valent conjugate  0.5 mL Intramuscular Once    clopidogrel  75 mg Oral Daily     Continuous Infusions:   PRN Meds: nitroGLYCERIN, polyethylene glycol, ALPRAZolam, budesonide, ipratropium    Data:     Past Medical History:   has a past medical history of Airway obstruction; Alcoholic polyneuropathy ([facility] Angina effort ([facility] Anxiety; Atrial fibrillation ([facility] CAD (coronary artery disease); Cancer [facility]); CHF (congestive heart failure) ([facility] Convulsions ([facility] COPD (chronic obstructive pulmonary disease) ([address] Degenerative disc disease; Depression; Depression; Diabetes mellitus ([address] Esophageal reflux; Hypertension; Inflammatory spondylopathy ([address]  Iron deficiency; Lymphoma ([facility] MDRO (multiple drug resistant organisms) resistance; MRSA (methicillin resistant staph aureus) culture positive; Neuropathy;

## 2018-08-25 NOTE — PLAN OF CARE
Problem: Pain:  Goal: Pain level will decrease  Pain level will decrease   Outcome: Ongoing    Goal: Control of acute pain  Control of acute pain   Outcome: Ongoing    Goal: Control of chronic pain  Control of chronic pain   Outcome: Ongoing      Problem: Risk for Impaired Skin Integrity  Goal: Tissue integrity - skin and mucous membranes  Structural intactness and normal physiological function of skin and  mucous membranes.    Outcome: Ongoing      Problem: Falls - Risk of:  Goal: Will remain free from falls  Will remain free from falls   Outcome: Met This Shift    Goal: Absence of physical injury  Absence of physical injury   Outcome: Met This Shift

## 2018-08-25 NOTE — PROGRESS NOTES
DATE: 2018    NAME: Abdirahman Schmitt  MRN: [id number]   : 1940    Patient not seen this date for Physical Therapy due to:  [] Blood transfusion in progress  [] Hemodialysis  [x] Patient Declined - very tired and would like to stay in bed; agreeable to pm if able, otherwise will recheck   [] Spine Precautions   [] Strict Bedrest  [] Surgery/ Procedure  [] Testing      [] Other        [] PT being discontinued at this time. Patient independent. No further needs. [] PT being discontinued at this time as the patient has been transferred to palliative care. No further needs.     Susie Cano, PT, DPT, CMPT

## 2018-08-26 PROBLEM — R16.1 SPLENOMEGALY: Status: ACTIVE | Noted: 2018-08-26

## 2018-08-26 PROBLEM — R10.84 GENERALIZED ABDOMINAL PAIN: Status: ACTIVE | Noted: 2018-08-26

## 2018-08-26 PROCEDURE — 6360000002 HC RX W HCPCS: Performed by: NURSE PRACTITIONER

## 2018-08-26 PROCEDURE — 6370000000 HC RX 637 (ALT 250 FOR IP): Performed by: NURSE PRACTITIONER

## 2018-08-26 PROCEDURE — 99223 1ST HOSP IP/OBS HIGH 75: CPT | Performed by: PSYCHIATRY & NEUROLOGY

## 2018-08-26 PROCEDURE — 94640 AIRWAY INHALATION TREATMENT: CPT

## 2018-08-26 PROCEDURE — 1200000000 HC SEMI PRIVATE

## 2018-08-26 PROCEDURE — 94762 N-INVAS EAR/PLS OXIMTRY CONT: CPT

## 2018-08-26 PROCEDURE — 99222 1ST HOSP IP/OBS MODERATE 55: CPT | Performed by: INTERNAL MEDICINE

## 2018-08-26 PROCEDURE — 2580000003 HC RX 258: Performed by: STUDENT IN AN ORGANIZED HEALTH CARE EDUCATION/TRAINING PROGRAM

## 2018-08-26 PROCEDURE — 6370000000 HC RX 637 (ALT 250 FOR IP): Performed by: PSYCHIATRY & NEUROLOGY

## 2018-08-26 PROCEDURE — 99232 SBSQ HOSP IP/OBS MODERATE 35: CPT | Performed by: FAMILY MEDICINE

## 2018-08-26 PROCEDURE — 6370000000 HC RX 637 (ALT 250 FOR IP): Performed by: FAMILY MEDICINE

## 2018-08-26 RX ORDER — LOPERAMIDE HYDROCHLORIDE 2 MG/1
2 CAPSULE ORAL 4 TIMES DAILY PRN
Status: DISCONTINUED | OUTPATIENT
Start: 2018-08-26 | End: 2018-08-28 | Stop reason: HOSPADM

## 2018-08-26 RX ORDER — HYDRALAZINE HYDROCHLORIDE 20 MG/ML
10 INJECTION INTRAMUSCULAR; INTRAVENOUS EVERY 6 HOURS PRN
Status: DISCONTINUED | OUTPATIENT
Start: 2018-08-26 | End: 2018-08-28 | Stop reason: HOSPADM

## 2018-08-26 RX ADMIN — ISOSORBIDE MONONITRATE 60 MG: 60 TABLET ORAL at 09:03

## 2018-08-26 RX ADMIN — AMLODIPINE BESYLATE 5 MG: 10 TABLET ORAL at 09:03

## 2018-08-26 RX ADMIN — PANTOPRAZOLE SODIUM 40 MG: 40 TABLET, DELAYED RELEASE ORAL at 09:03

## 2018-08-26 RX ADMIN — CARBIDOPA AND LEVODOPA 1 TABLET: 25; 100 TABLET ORAL at 13:16

## 2018-08-26 RX ADMIN — ASPIRIN 81 MG 81 MG: 81 TABLET ORAL at 09:03

## 2018-08-26 RX ADMIN — DIVALPROEX SODIUM 125 MG: 125 TABLET, DELAYED RELEASE ORAL at 09:03

## 2018-08-26 RX ADMIN — MOMETASONE FUROATE AND FORMOTEROL FUMARATE DIHYDRATE 2 PUFF: 200; 5 AEROSOL RESPIRATORY (INHALATION) at 20:27

## 2018-08-26 RX ADMIN — CARVEDILOL 6.25 MG: 12.5 TABLET, FILM COATED ORAL at 17:45

## 2018-08-26 RX ADMIN — APIXABAN 5 MG: 5 TABLET, FILM COATED ORAL at 20:25

## 2018-08-26 RX ADMIN — ALPRAZOLAM 0.5 MG: 0.5 TABLET ORAL at 20:30

## 2018-08-26 RX ADMIN — ACETAMINOPHEN 650 MG: 325 TABLET ORAL at 03:52

## 2018-08-26 RX ADMIN — Medication 10 ML: at 21:33

## 2018-08-26 RX ADMIN — CARVEDILOL 6.25 MG: 12.5 TABLET, FILM COATED ORAL at 09:04

## 2018-08-26 RX ADMIN — CARBIDOPA AND LEVODOPA 1 TABLET: 25; 100 TABLET ORAL at 17:44

## 2018-08-26 RX ADMIN — CLOPIDOGREL 75 MG: 75 TABLET, FILM COATED ORAL at 09:03

## 2018-08-26 RX ADMIN — HYDRALAZINE HYDROCHLORIDE 10 MG: 20 INJECTION INTRAMUSCULAR; INTRAVENOUS at 21:32

## 2018-08-26 RX ADMIN — LOPERAMIDE HYDROCHLORIDE 2 MG: 2 CAPSULE ORAL at 22:44

## 2018-08-26 RX ADMIN — APIXABAN 5 MG: 5 TABLET, FILM COATED ORAL at 09:03

## 2018-08-26 RX ADMIN — Medication 500 MCG: at 09:03

## 2018-08-26 RX ADMIN — DIVALPROEX SODIUM 125 MG: 125 TABLET, DELAYED RELEASE ORAL at 13:16

## 2018-08-26 RX ADMIN — GABAPENTIN 300 MG: 300 CAPSULE ORAL at 20:24

## 2018-08-26 RX ADMIN — GABAPENTIN 300 MG: 300 CAPSULE ORAL at 09:03

## 2018-08-26 RX ADMIN — DULOXETINE HYDROCHLORIDE 90 MG: 30 CAPSULE, DELAYED RELEASE ORAL at 09:03

## 2018-08-26 RX ADMIN — CARBIDOPA AND LEVODOPA 1 TABLET: 25; 100 TABLET ORAL at 20:25

## 2018-08-26 RX ADMIN — CARBIDOPA AND LEVODOPA 1 TABLET: 25; 100 TABLET ORAL at 09:03

## 2018-08-26 RX ADMIN — MOMETASONE FUROATE AND FORMOTEROL FUMARATE DIHYDRATE 2 PUFF: 200; 5 AEROSOL RESPIRATORY (INHALATION) at 09:36

## 2018-08-26 RX ADMIN — LEVOTHYROXINE SODIUM 25 MCG: 25 TABLET ORAL at 06:41

## 2018-08-26 ASSESSMENT — PAIN SCALES - GENERAL
PAINLEVEL_OUTOF10: 0
PAINLEVEL_OUTOF10: 3
PAINLEVEL_OUTOF10: 0

## 2018-08-26 NOTE — PLAN OF CARE
R[facility]hav [facility]    Second Visit Note  For more detailed information please refer to the progress note of the day      8/26/2018    5:28 PM    Name:   Madie Coyne  MRN:     [id number]     [place]:      [de-identified]   Room:   [facility]1376-61   Day:  2  Admit Date:  8/24/2018  5:41 AM    PCP:   No primary care provider on file.   Code Status:  Prior        Pt vitals were reviewed   New labs were reviewed   Consult note reviewed  Patient was seen, she has multiple comorbiditiests, seeing a lot of specialist, never discussed goal and code status  Sister called and I updated her  They are tired from seeing doctors with no fix, I explained that he conditions are not fixable and that she might even get worse with time  They agreed to have palliative involved to discuss code and goal of care    Updated plan :     Palliative care consult   Still will go with mesenteric duplex for now, depending on her decision, we might need to add vascular consult in case she wants to continue all care      Phoebe Welsh MD  8/26/2018  5:28 PM

## 2018-08-26 NOTE — PROGRESS NOTES
Notified Yves Blcak NP that pts venous gases from 5:30pm show respiratory acidosis. Pt denies any shortness of breath, resps 16, O2 sat is 98% on 2L nasal cannula.  Respiratory made a note on the pt at 9:11am

## 2018-08-26 NOTE — PROGRESS NOTES
[facility]  Occupational Therapy Not Seen Note    DATE: 2018  Name: Taylor Blanca  : 1940  MRN: [id number]    Patient not available for Occupational Therapy due to:    [] Testing:    [] Hemodialysis    [] Blood Transfusion in Progress    [x]Refusal by Patient: Pt states \"I just want to sleep\" despite going to bathroom x6 this am per RN.    [] Surgery/Procedure:    [] Strict Bedrest    [] Sedation    [] Spine Precautions     [] Pt being transferred to palliative care at this time. Spoke with pt/family and OT services to be defered. [] Pt independent with functional mobility and functional tasks. Pt with no OT acute care needs at this time, will defer OT eval.    [] Other    Next Scheduled Treatment: Attempt on  as appropriate. Signature:  Ousmane Minus OTR/L

## 2018-08-26 NOTE — PROGRESS NOTES
murmur  Abdomen:  soft, nontender, nondistended, normal bowel sounds, no masses, hepatomegaly, splenomegaly  Extremities:  no edema, redness, tenderness in the calves  Skin:  no gross lesions, rashes, induration    Assessment:        Primary Problem  Unresponsive episode    Active Hospital Problems    Diagnosis Date Noted    Unresponsive episode [R41.89] 08/24/2018     Priority: High    Hypoxia [R09.02]     Pancytopenia (HCC) [D61.818]     COPD exacerbation ([facility] [J44.1] 02/26/2018    Severe hypertension [I10]     Carotid stenosis [I65.29] 12/21/2014    CAD (coronary artery disease) [I25.10] 03/22/2014    Depression [F32.9] 12/15/2013       Plan:         Episode of Unresponsiveness  most likely hypoxia and hypercapnia, no seizure per neurology, CRP, procalcitonin are normal so less likely it is infectious    On Depakote , not clear why: Okay to discontinue per neurology     Abdominal pain: CT abdomen showed splenomegaly with significant arterial calcification , don't mesenteric duplex, she might have abdominal angina     history of A. fib : Restart AC for now till getting more info, plt and Hb stable. Will get hematology recs either to continue or stop as her history is significant and has risk factors for bleeding as well as thromboembolism and stroke given her malignancy and afib      Hem/Onc consult , orderedAbdominal ultrasound to evaluate worsening of her splenomegaly and her hyperdense lesion , ordered iron studies and B12 as well     Parkinson disease: On Sinemet, ok to stop Depakote as it is not clear and patient does not know why was started, Can f/u with her neurologist as OP.      Chronic hypoxemic resp failure: Continue O2 supplement    Monitor labs and vitals daily    Carotid a stenosis , vascular consulted last admission and decided  no vascular intervention     Continue chronic home meds    DVT and GI PPX    Per onc note  History of breast cancer 1993  History of lung cancer status post

## 2018-08-26 NOTE — PROGRESS NOTES
Date:                           8/26/2018  Patient name:           Stewart Rubin  Date of admission:  8/24/2018  5:41 AM  MRN:   [id number]  YOB: 1940  PCP:                           No primary care provider on file. Subjective:   Pt seen and examined. Labs and vitals personally reviewed. Patient complained of nausea and abdominal discomfort after eating. Mentation back to baseline. Denies fever chills. REVIEW OF SYSTEMS:  General: no fever or night sweats, Weight is stable. ENT: No double or blurred vision, no hearing problem, no dysphagia or sore throat   Respiratory: No chest pain, no shortness of breath, no cough or hemoptysis. Cardiovascular: Denies chest pain, PND or orthopnea. No L E swelling or palpitations. Gastrointestinal:    Positive for nausea and abdominal discomfort  Genitourinary: Denies dysuria, hematuria, frequency, urgency or incontinence. Neurological: Denies headaches, decreased LOC, no sensory or motor focal deficits. Musculoskeletal:  No arthralgia no back pain or joint swelling. Skin: There are no rashes or bleeding.   Psych: Denies hallucinations or intentions to harm self        Objective:     Vitals: /62   Pulse 81   Temp 98.6 °F (37 °C) (Oral)   Resp 16   Ht 4' 11\" (1.499 m)   Wt 119 lb 6.4 oz (54.2 kg)   SpO2 98%   BMI 24.12 kg/m²   General appearance - well appearing, no in pain or distress  Mental status - AAO X3  Eyes - pupils equal and reactive, extraocular eye movements intact  Mouth - mucous membranes moist, pharynx normal without lesions  Neck - supple, no significant adenopathy  Lymphatics - no palpable lymphadenopathy, no hepatosplenomegaly  Chest - clear to auscultation, no wheezes, rales or rhonchi, symmetric air entry  Heart - normal rate, regular rhythm, normal S1, S2, no murmurs  Abdomen - soft, nontender, nondistended, no masses or organomegaly  Neurological - alert, oriented, normal speech, no focal Comprehensive Metabolic Panel w/ Reflex to MG   Result Value Ref Range    Glucose 88 70 - 99 mg/dL    BUN 26 (H) 8 - 23 mg/dL    CREATININE 0.99 (H) 0.50 - 0.90 mg/dL    Bun/Cre Ratio NOT REPORTED 9 - 20    Calcium 8.1 (L) 8.6 - 10.4 mg/dL    Sodium 139 135 - 144 mmol/L    Potassium 4.4 3.7 - 5.3 mmol/L    Chloride 101 98 - 107 mmol/L    CO2 31 20 - 31 mmol/L    Anion Gap 7 (L) 9 - 17 mmol/L    Alkaline Phosphatase 53 35 - 104 U/L    ALT <5 (L) 5 - 33 U/L    AST 14 <32 U/L    Total Bilirubin 0.25 (L) 0.3 - 1.2 mg/dL    Total Protein 6.4 6.4 - 8.3 g/dL    Alb 3.4 (L) 3.5 - 5.2 g/dL    Albumin/Globulin Ratio 1.1 1.0 - 2.5    GFR Non-African American 55 (L) >60 mL/min    GFR African American >60 >60 mL/min    GFR Comment          GFR Staging NOT REPORTED    Troponin   Result Value Ref Range    Troponin T <0.03 <0.03 ng/mL    Troponin Interp         Protime-INR   Result Value Ref Range    Protime 10.0 9.0 - 12.0 sec    INR 0.9    Urinalysis   Result Value Ref Range    Color, UA YELLOW YEL    Turbidity UA CLEAR CLEAR    Glucose, Ur NEGATIVE NEG    Bilirubin Urine NEGATIVE NEG    Ketones, Urine NEGATIVE NEG    Specific Gravity, UA 1.034 (H) 1.005 - 1.030    Urine Hgb NEGATIVE NEG    pH, UA 5.5 5.0 - 8.0    Protein, UA NEGATIVE NEG    Urobilinogen, Urine Normal NORM    Nitrite, Urine NEGATIVE NEG    Leukocyte Esterase, Urine NEGATIVE NEG    Urinalysis Comments       Microscopic exam not performed based on chemical results unless requested in   Ethanol   Result Value Ref Range    Ethanol <10 <10 mg/dL    Ethanol percent <0.010 %   Urine Drug Screen   Result Value Ref Range    Amphetamine Screen, Ur NEGATIVE NEG    Barbiturate Screen, Ur NEGATIVE NEG    Benzodiazepine Screen, Urine POSITIVE (A) NEG    Cocaine Metabolite, Urine NEGATIVE NEG    Methadone Screen, Urine NEGATIVE NEG    Opiates, Urine NEGATIVE NEG    Phencyclidine, Urine NEGATIVE NEG    Propoxyphene, Urine NOT REPORTED NEG    Cannabinoid Scrn, Ur NEGATIVE NEG 98 - 107 mmol/L    CO2 31 20 - 31 mmol/L    Anion Gap 9 9 - 17 mmol/L    GFR Non-African American >60 >60 mL/min    GFR African American >60 >60 mL/min    GFR Comment          GFR Staging NOT REPORTED    CBC   Result Value Ref Range    WBC 2.8 (L) 3.5 - 11.3 k/uL    RBC 3.26 (L) 3.95 - 5.11 m/uL    Hemoglobin 10.4 (L) 11.9 - 15.1 g/dL    Hematocrit 32.9 (L) 36.3 - 47.1 %    .9 82.6 - 102.9 fL    MCH 31.9 25.2 - 33.5 pg    MCHC 31.6 28.4 - 34.8 g/dL    RDW 16.5 (H) 11.8 - 14.4 %    Platelets 368 (L) 028 - 453 k/uL    MPV 10.6 8.1 - 13.5 fL    NRBC Automated 0.0 0.0 per 100 WBC   C-Reactive Protein   Result Value Ref Range    CRP 2.6 0.0 - 5.0 mg/L   Procalcitonin   Result Value Ref Range    Procalcitonin 0.02 <0.09 ng/mL   BLOOD GAS, VENOUS   Result Value Ref Range    pH, Trevor 7.316 (L) 7.320 - 7.420    pCO2, Trevor 68.8 (H) 39 - 55    pO2, Trevor 35.5 30 - 50    HCO3, Venous 34.1 (H) 24 - 30 mmol/L    Positive Base Excess, Trevor 6.7 (H) 0.0 - 2.0 mmol/L    Negative Base Excess, Trevor NOT REPORTED 0.0 - 2.0 mmol/L    O2 Sat, Trevor 58.6 (L) 60.0 - 85.0 %    Total Hb NOT REPORTED 12.0 - 16.0 g/dl    Oxyhemoglobin NOT REPORTED 95.0 - 98.0 %    Carboxyhemoglobin 1.8 0 - 5 %    Methemoglobin NOT REPORTED 0.0 - 1.5 %    Pt Temp 37.0     pH, Trevor, Temp Adj NOT REPORTED 7.320 - 7.420    pCO2, Trevor, Temp Adj NOT REPORTED 39 - 55 mmHg    pO2, Trevor, Temp Adj NOT REPORTED 30 - 50 mmHg    O2 Device/Flow/% NOT REPORTED     Respiratory Rate NOT REPORTED     Yaw Test NOT REPORTED     Sample Site NOT REPORTED     Pt.  Position NOT REPORTED     Mode NOT REPORTED     Set Rate NOT REPORTED     Total Rate NOT REPORTED     VT NOT REPORTED     FIO2 INFORMATION NOT PROVIDED     Peep/Cpap NOT REPORTED     PSV NOT REPORTED     Text for Respiratory NOT REPORTED     NOTIFICATION NOT REPORTED     NOTIFICATION TIME NOT REPORTED    Vitamin B12 & Folate   Result Value Ref Range    Vitamin B-12 Pending pg/mL    Folate 13.3 >4.8 ng/mL   Electrophoresis Protein, Serum without Reflex to Immunofixation   Result Value Ref Range    Total Protein 6.2 (L) 6.4 - 8.3 g/dL    Albumin (calculated) 3.9 3.2 - 5.2 g/dL    Albumin % 64 45 - 65 %    Alpha-1-Globulin 0.1 0.1 - 0.4 g/dL    Alpha 1 % 2 (L) 3 - 6 %    Alpha-2-Globulin 0.6 0.5 - 0.9 g/dL    Alpha 2 % 9 6 - 13 %    Beta Globulin 0.6 0.5 - 1.1 g/dL    Beta Percent 9 (L) 11 - 19 %    Gamma Globulin 1.0 0.5 - 1.5 g/dL    Gamma Globulin % 16 9 - 20 %    Total Prot. Sum 6.2 (L) 6.3 - 8.2 g/dL    Total Prot.  Sum,% 100 98 - 102 %    Protein Electrophoresis, Serum Pending     Pathologist Pending    Kappa/Lambda Free Lt Chains, Serum Quant   Result Value Ref Range    Kappa Free Light Chains QNT 6.86 (H) 0.37 - 1.94 mg/dL    Lambda Free Light Chains QNT 2.57 0.57 - 2.63 mg/dL    Free Kappa/Lambda Ratio 2.67 (H) 0.26 - 1.65   Differential   Result Value Ref Range    Differential Type NOT REPORTED     Seg Neutrophils 51 36 - 65 %    Lymphocytes 34 24 - 43 %    Monocytes 10 3 - 12 %    Eosinophils % 4 1 - 4 %    Basophils 1 0 - 2 %    Immature Granulocytes 0 0 %    Segs Absolute 1.45 (L) 1.50 - 8.10 k/uL    Absolute Lymph # 0.94 (L) 1.10 - 3.70 k/uL    Absolute Mono # 0.27 0.10 - 1.20 k/uL    Absolute Eos # 0.10 0.00 - 0.44 k/uL    Basophils # <0.03 0.00 - 0.20 k/uL    Absolute Immature Granulocyte <0.03 0.00 - 0.30 k/uL    WBC Morphology NOT REPORTED     RBC Morphology ANISOCYTOSIS PRESENT     Platelet Estimate NOT REPORTED    Reticulocytes   Result Value Ref Range    Retic % 4.3 (H) 0.5 - 1.9 %    Absolute Retic # 0.140 (H) 0.030 - 0.080 M/uL    Immature Retic Fract 17.800 2.7 - 18.3 %    Retic Hemoglobin 35.6 28.2 - 35.7 pg   Iron and TIBC   Result Value Ref Range    Iron 59 37 - 145 ug/dL    TIBC 261 250 - 450 ug/dL    Iron Saturation 23 20 - 55 %    UIBC 202 112 - 347 ug/dL   Ferritin   Result Value Ref Range    Ferritin 196 (H) 13 - 150 ug/L   Venous Blood Gas, POC   Result Value Ref Range    pH, Trevor 7.324 7.320 - 7.430 pCO2, Trevor 62.5 (H) 41.0 - 51.0 mm Hg    pO2, Trevor 41.5 30.0 - 50.0 mm Hg    HCO3, Venous 32.5 (H) 22.0 - 29.0 mmol/L    Total CO2, Venous 34 (H) 23.0 - 30.0 mmol/L    Negative Base Excess, Trevor NOT REPORTED 0.0 - 2.0    Positive Base Excess, Trevor 5 (H) 0.0 - 3.0    O2 Sat, Trevor 71 60.0 - 85.0 %    O2 Device/Flow/% NOT REPORTED     Yaw Test NOT REPORTED     Sample Site NOT REPORTED     Mode NOT REPORTED     FIO2 NOT REPORTED     Pt Temp NOT REPORTED     POC pH Temp NOT REPORTED     POC pCO2 Temp NOT REPORTED mm Hg    POC pO2 Temp NOT REPORTED mm Hg   Creatinine W/GFR Point of Care   Result Value Ref Range    POC Creatinine 1.15 0.51 - 1.19 mg/dL    GFR Comment 55 (L) >60 mL/min    GFR Non- 46 (L) >60 mL/min    GFR Comment         Lactic Acid, POC   Result Value Ref Range    POC Lactic Acid 0.92 0.56 - 1.39 mmol/L   POCT Glucose   Result Value Ref Range    POC Glucose 84 74 - 100 mg/dL   Anion Gap (Calc) POC   Result Value Ref Range    Anion Gap 7 7 - 16 mmol/L   POCT troponin   Result Value Ref Range    POC Troponin I 0.00 0.00 - 0.10 ng/mL    POC Troponin Interp       The Troponin-I (POC) results cannot be compared to the Troponin-T results. Venous Blood Gas, POC   Result Value Ref Range    pH, Trevor 7.260 (L) 7.320 - 7.430    pCO2, Trevor 83.3 (HH) 41.0 - 51.0 mm Hg    pO2, Trevor 27.6 (L) 30.0 - 50.0 mm Hg    HCO3, Venous 37.3 (H) 22.0 - 29.0 mmol/L    Total CO2, Venous 40 (H) 23.0 - 30.0 mmol/L    Negative Base Excess, Trevor NOT REPORTED 0.0 - 2.0    Positive Base Excess, Trevor 8 (H) 0.0 - 3.0    O2 Sat, Trevor 40 (L) 60.0 - 85.0 %    O2 Device/Flow/% NOT REPORTED     Yaw Test NOT REPORTED     Sample Site NOT REPORTED     Mode NOT REPORTED     FIO2 NOT REPORTED     Pt Temp NOT REPORTED     POC pH Temp NOT REPORTED     POC pCO2 Temp NOT REPORTED mm Hg    POC pO2 Temp NOT REPORTED mm Hg   Creatinine W/GFR Point of Care   Result Value Ref Range    POC Creatinine Result not available.  0.51 - 1.19 mg/dL    GFR Comment CANNOT BE CALCULATED >60 mL/min    GFR Non- CANNOT BE CALCULATED >60 mL/min    GFR Comment         Lactic Acid, POC   Result Value Ref Range    POC Lactic Acid 0.64 0.56 - 1.39 mmol/L   POCT Glucose   Result Value Ref Range    POC Glucose 76 74 - 100 mg/dL   Anion Gap (Calc) POC   Result Value Ref Range    Anion Gap 5 (L) 7 - 16 mmol/L   EKG 12 lead   Result Value Ref Range    Ventricular Rate 80 BPM    Atrial Rate 80 BPM    P-R Interval 138 ms    QRS Duration 78 ms    Q-T Interval 360 ms    QTc Calculation (Bazett) 415 ms    P Axis 74 degrees    R Axis 75 degrees    T Axis 80 degrees       Ct Head Wo Contrast    Result Date: 8/24/2018  EXAMINATION: CT OF THE HEAD WITHOUT CONTRAST  8/24/2018 6:08 am TECHNIQUE: CT of the head was performed without the administration of intravenous contrast. Dose modulation, iterative reconstruction, and/or weight based adjustment of the mA/kV was utilized to reduce the radiation dose to as low as reasonably achievable. COMPARISON: May 5, 2018 HISTORY: ORDERING SYSTEM PROVIDED HISTORY: MENTAL STATUS CHANGE (AFTER TRAUMA) TECHNOLOGIST PROVIDED HISTORY: FINDINGS: BRAIN/VENTRICLES: There is no acute intracranial hemorrhage, mass effect or midline shift. The gray-white differentiation is maintained without evidence of an acute infarct. There is no evidence of hydrocephalus. Right greater than left basal ganglia and cerebellar chronic lacunar infarcts present. Patchy cerebral white matter hypoattenuation. Prominent extra-axial spaces along the biparietal convexity again noted. ORBITS: The visualized portion of the orbits demonstrate no acute abnormality. SINUSES: The visualized paranasal sinuses and mastoid air cells demonstrate no acute abnormality. SOFT TISSUES/SKULL:  No acute abnormality of the visualized skull or soft tissues. No acute intracranial abnormality. Stable chronic microangiopathic ischemic changes and scattered chronic lacunar infarcts. diverticulosis. 4. Severe vascular calcifications. Xr Chest Portable    Result Date: 8/24/2018  EXAMINATION: SINGLE XRAY VIEW OF THE CHEST 8/24/2018 6:09 am COMPARISON: 05/05/2018 HISTORY: ORDERING SYSTEM PROVIDED HISTORY: stroke alert TECHNOLOGIST PROVIDED HISTORY: stroke alert FINDINGS: Right internal jugular infusion port terminating right atrium unchanged. Normal cardiomediastinal silhouette. Elevation of right hemidiaphragm with minor scarring at the lung base unchanged. No focal consolidation. Some calcified nodules right lower lung unchanged. No pleural effusion or pneumothorax. Right IJ infusion port. Elevated right hemidiaphragm. No acute process. Cta Neck W Contrast    Result Date: 8/25/2018  EXAMINATION: CTA OF THE HEAD WITH CONTRAST; CTA OF THE NECK 8/24/2018 6:36 am: TECHNIQUE: CTA of the head/brain was performed with the administration of intravenous contrast. Multiplanar reformatted images are provided for review. MIP images are provided for review. Dose modulation, iterative reconstruction, and/or weight based adjustment of the mA/kV was utilized to reduce the radiation dose to as low as reasonably achievable.; CTA of the neck was performed with the administration of intravenous contrast. Multiplanar reformatted images are provided for review. MIP images are provided for review. Stenosis of the internal carotid arteries measured using NASCET criteria. Dose modulation, iterative reconstruction, and/or weight based adjustment of the mA/kV was utilized to reduce the radiation dose to as low as reasonably achievable. COMPARISON: None HISTORY: ORDERING SYSTEM PROVIDED HISTORY: stroke TECHNOLOGIST PROVIDED HISTORY: FINDINGS: CTA NECK: AORTIC ARCH/ARCH VESSELS: There is a normal branch pattern of the aortic arch. No significant stenosis is seen of the innominate artery or subclavian arteries.  CAROTID ARTERIES: Right internal carotid artery mixed density plaques narrow the lumen to 1.4 mm, distally averaging 5 mm. Left internal carotid artery calcified plaques do not result in significant stenosis. The common carotid arteries demonstrate peripheral atheromatous plaques but remain patent. VERTEBRAL ARTERIES: There is multifocal luminal irregularities of the right vertebral artery contributing to multifocal mild stenosis without occlusion. Left vertebral artery is patent with minimal atheromatous plaques of the V4 segment and origin. SOFT TISSUES: Emphysematous changes of the lung apices. Diminutive right thyroid lobe BONES: The visualized osseous structures appear unremarkable. CTA HEAD: ANTERIOR CIRCULATION: Carotid siphon calcifications without significant stenosis. The anterior cerebral and middle cerebral arteries appear patent. POSTERIOR CIRCULATION: The posterior cerebral arteries demonstrate no focal stenosis. The vertebral and basilar arteries appear unremarkable. BRAIN: No mass effect or midline shift. No abnormal extra-axial fluid collection. The gray-white differentiation appears grossly maintained. Nondominant right vertebral artery multifocal mild stenosis due to atheromatous plaques. Right internal carotid artery origin 70% stenosis by NASCET criteria. Patent left ICA. Intracranial arteries without acute findings. Findings were conveyed to Dr. Alondra Pena At 7:00 am on 8/24/2018. Us Abdomen Limited    Result Date: 8/25/2018  EXAMINATION: RIGHT UPPER QUADRANT ULTRASOUND 8/25/2018 4:07 pm COMPARISON: CT abdomen and pelvis performed earlier today. HISTORY: ORDERING SYSTEM PROVIDED HISTORY: To evaluate for hepatosplenomegaly. TECHNOLOGIST PROVIDED HISTORY: Patient has splenomegaly and hypodense lesion on previous CT scan To evaluate for hepatosplenomegaly. FINDINGS: LIVER:  The liver demonstrates normal echogenicity without evidence of intrahepatic biliary ductal dilatation. No focal mass. Hepatopetal flow seen within the portal vein.  BILIARY SYSTEM:  Gallbladder has been surgically removed. Common bile duct is within normal limits measuring 6.2 mm. SPLEEN: The spleen is normal in size measuring 12 cm with a benign-appearing cystic lesion as seen on the prior CT study measuring 2.5 cm in greatest dimension. No surrounding free fluid. PANCREAS:  Not visualized due to overlying bowel gas. OTHER: No evidence of right upper quadrant ascites. 1.   No ultrasound evidence of hepatosplenomegaly. 2. Benign cyst is seen within the spleen measuring 2.5 cm in greatest dimension. Cta Head W Contrast    Result Date: 8/25/2018  EXAMINATION: CTA OF THE HEAD WITH CONTRAST; CTA OF THE NECK 8/24/2018 6:36 am: TECHNIQUE: CTA of the head/brain was performed with the administration of intravenous contrast. Multiplanar reformatted images are provided for review. MIP images are provided for review. Dose modulation, iterative reconstruction, and/or weight based adjustment of the mA/kV was utilized to reduce the radiation dose to as low as reasonably achievable.; CTA of the neck was performed with the administration of intravenous contrast. Multiplanar reformatted images are provided for review. MIP images are provided for review. Stenosis of the internal carotid arteries measured using NASCET criteria. Dose modulation, iterative reconstruction, and/or weight based adjustment of the mA/kV was utilized to reduce the radiation dose to as low as reasonably achievable. COMPARISON: None HISTORY: ORDERING SYSTEM PROVIDED HISTORY: stroke TECHNOLOGIST PROVIDED HISTORY: FINDINGS: CTA NECK: AORTIC ARCH/ARCH VESSELS: There is a normal branch pattern of the aortic arch. No significant stenosis is seen of the innominate artery or subclavian arteries. CAROTID ARTERIES: Right internal carotid artery mixed density plaques narrow the lumen to 1.4 mm, distally averaging 5 mm. Left internal carotid artery calcified plaques do not result in significant stenosis.   The common carotid arteries demonstrate peripheral atheromatous plaques but remain patent. VERTEBRAL ARTERIES: There is multifocal luminal irregularities of the right vertebral artery contributing to multifocal mild stenosis without occlusion. Left vertebral artery is patent with minimal atheromatous plaques of the V4 segment and origin. SOFT TISSUES: Emphysematous changes of the lung apices. Diminutive right thyroid lobe BONES: The visualized osseous structures appear unremarkable. CTA HEAD: ANTERIOR CIRCULATION: Carotid siphon calcifications without significant stenosis. The anterior cerebral and middle cerebral arteries appear patent. POSTERIOR CIRCULATION: The posterior cerebral arteries demonstrate no focal stenosis. The vertebral and basilar arteries appear unremarkable. BRAIN: No mass effect or midline shift. No abnormal extra-axial fluid collection. The gray-white differentiation appears grossly maintained. Nondominant right vertebral artery multifocal mild stenosis due to atheromatous plaques. Right internal carotid artery origin 70% stenosis by NASCET criteria. Patent left ICA. Intracranial arteries without acute findings. Findings were conveyed to Dr. Alicia Sweeney At 7:00 am on 8/24/2018. Mri Brain W Wo Contrast    Result Date: 8/24/2018  EXAMINATION: MRI OF THE BRAIN WITHOUT AND WITH CONTRAST  8/24/2018 2:25 pm TECHNIQUE: Multiplanar multisequence MRI of the head/brain was performed without and with the administration of intravenous contrast. COMPARISON: 05/07/2018. HISTORY: ORDERING SYSTEM PROVIDED HISTORY: seizure Initial evaluation. FINDINGS: Motion degrades images limiting evaluation. INTRACRANIAL STRUCTURES/VENTRICLES:  There is no acute infarct. Areas of T2 FLAIR hyperintensity are seen in the periventricular and subcortical white matter, which are nonspecific, but may represent chronic microvascular ischemic change. T2 hyperintensity is again seen within the cerebellar hemispheres bilaterally. No mass effect or midline shift.  No evidence of an acute intracranial hemorrhage. The ventricles and sulci are normal in size and configuration. The sellar/suprasellar regions appear unremarkable. The normal signal voids within the major intracranial vessels appear maintained. No abnormal focus of enhancement is seen within the brain. ORBITS: The visualized portion of the orbits demonstrate no acute abnormality. SINUSES: The visualized paranasal sinuses and mastoid air cells are well aerated. BONES/SOFT TISSUES: The bone marrow signal intensity appears normal. The soft tissues demonstrate no acute abnormality. 1. No acute intracranial abnormality. No acute infarct. 2. Mild-to-moderate chronic microvascular ischemic change, which appear similar to the exam. 3. T2 hyperintensity again seen within the cerebellar hemispheres bilaterally, which could represent sequelae of a prior ischemic or traumatic insult. 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[address]     [facility]ina effort ([facility]     Anxiety     Atrial fibrillation ([facility])     CAD (coronary artery disease)     s/p stents    Cancer ([facility])     breast, right    CHF (congestive heart failure) ([facility])     unspecified diastolic    Convulsions ([facility])     COPD (chronic obstructive pulmonary disease) ([facility])     Degenerative disc disease     Cervical    Depression     Depression     Diabetes mellitus ([address]     Esophageal reflux     Hypertension     unspecified    Inflammatory spondylopathy ([address]     Iron deficiency     Lymphoma ([address] 1993    MDRO (multiple drug resistant organisms) resistance 8/10/2014    E. Coli urine    MRSA (methicillin resistant staph aureus) culture positive 07/13/2016    nares    Neuropathy     Osteoarthritis     Parkinson's disease ([address]     Peripheral vascular disease ([facility])     Restless leg syndrome     Sleep apnea     Transient cerebral ischemia     Unsteady gait       Allergies:    Allergies   Allergen Reactions    Naprosyn [Naproxen] Hives    Actonel [Risedronate Sodium]     Albuterol Sulfate     Atenolol     Codeine Other (See Comments)     unknown reaction    Cortisone     Requip [Ropinirole Hcl]         Assessment      Primary Problem:  Unresponsive episode   Current Problems:  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 [Z86.73]     Unresponsive episode [R41.89] 08/24/2018    Hypoxia [R09.02]     Pancytopenia ([facility] [D61.818]     COPD exacerbation ([facility] [J44.1] 02/26/2018    Essential hypertension [I10]     Carotid stenosis [I65.29] 12/21/2014    CAD (coronary artery disease) [I25.10] 03/22/2014    Depression [F32.9] 12/15/2013     History of breast cancer 1993  History of lung cancer status post resection  MALT of left lung  PET avid lung nodule per CT PET 5/2018  Pancytopenia with macrocytic anemia, neutropenia  Splenomegaly and hyperdense splenic lesion    Plan   I personally reviewed the results of lab workup imaging studies and previous medical records. I also reviewed records from patient's oncologist.  Patient's pancytopenia is multifactorial secondary to splenomegaly history of treatment with chemotherapy, polypharmacy and myelosuppression from acute illness. Additionally I'm concerned if patient's MDS is progressing. Workup so far shows sufficient iron stores and folic acid stores. B12 level is pending. Peripheral smear and Immunofixation studies pending. Ultrasound spleen does not show evidence of hepatosplenomegaly. Lesion noted on CT scan characterized as benign lesion on ultrasound  Continue monitoring for pet avid lung lesion.   Patient to have outpatient CT PET through her primary oncologist  Do not recommen  [id number] at this point                This note is created with the assistance of a speech recognition program.  While intending to

## 2018-08-26 NOTE — PROGRESS NOTES
None  · Anthropometric Measures:  · Ht: 4' 11\" (149.9 cm)   · Current Body Wt: 119 lb 7.8 oz (54.2 kg)  · Usual Body Wt:noted weight of 130 lb on 11/29/17 per EHR  · % Weight Change: 8%,  9 months  · Ideal Body Wt: 97 lb 8 oz (44.2 kg), % Ideal Body 122%  · BMI Classification: BMI 18.5 - 24.9 Normal Weight  · Comparative Standards (Estimated Nutrition Needs):  · Estimated Daily Total Kcal: 1272-5423 kcal/day   · Estimated Daily Protein (g): 65-70 g pro/day    Estimated Intake vs Estimated Needs: Intake Less Than Needs    Nutrition Risk Level: Moderate    Nutrition Interventions:   Encourage po intake as tolerated. Remove dental soft from diet order per pt request. Start Standard ONS (chocolate). Continued Inpatient Monitoring, Education Not Indicated    Nutrition Evaluation:   · Evaluation: Goals set   · Goals: meet % of estimated nutrition needs with oral diet/supplements    · Monitoring: Meal Intake, Supplement Intake, Diet Tolerance, Chewing/Swallowing, Weight, Comparative Standards, Pertinent Labs    See Adult Nutrition Doc Flowsheet for more detail.      Electronically signed by Martha Minor RD, LD on 8/26/18 at 2:47 PM    Contact Number: [phone]

## 2018-08-26 NOTE — CONSULTS
Department of Neurology/Neurosurgery                                          Resident Consult Note      Reason for Consult:  Unresponsive episode, Parkinson's Disease  Requesting Physician:  Dr. Lidia Lowe  Neurologist/Neurosurgeon: Dr. Laquita Garcia    History Obtained From:  patient    CHIEF COMPLAINT:       Found unresponsive    HISTORY OF PRESENT ILLNESS:       The patient is a 70 y.o. 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 She was transferred from her nursing facility for unresponsiveness on 8/24/2018. She went out to smoke at 8/24 2:30am, and at 05:45am, staff called for unresponsiveness. EMS found her to have unequal pupils, and moaning, able to follow commands. EMS gave pt 1 tx of albuterol, and she was placed on 5L NC. Pt able to move her toes, and squeeze bilateral hands.     On arrival, /73. Stroke alert was called and initial NIHSS at 5:55am was 23. CT head unremarkable; CTA with 70% R ICA stenosis. MRI brain revealed no acute ischemia, but revealed T2 hyperintensity in cerebellar hemispheres bilaterally, possibly from prior ischemic insult or trauma. After CT and CTA patient became increasingly awake with improved exam. VBG showed ph 7.324; pCO2 62.5; HCO3 32.5. Repeated VBG with pH 7.26, CO2 83.3, oxygen 27.6. Repeat NIHSS at 6:37 was 9, and repeat NIHSS at 10:51 am was 2 for orientation and ataxia (likely baseline with her chronic tremor). She had  similar presentation of unresponsiveness and hypoxia in May 2018, and in 2014 where MRI brain revealed the cerebellar T2 hyperintensities. She was loaded with 300 mg plavix and started on 75 mg plavix daily. Started on Eliquis 5mg for her AF. Started on asa 80.       EEG was done to evaluate for possible seizure with post-ictal etiology of presentation, and revealed no epileptogenic focus or EEG seizures, but revealed diffuse encephalopathy. MRI did not reveal hyperintensity suggesting a recent seizure. She reports she has had worsening of her tremor in the past 2 weeks with difficulty with using her arms. She says she feels her arms are weaker the past 2 weeks. The tremor is present at rest and with movement, and does not resolve with distraction. She had She is on Sinemet. She is on Depakote, and level on arrival was 20. Kappa/Lambda ratio 2.67. Found to be pancytopenic. PAST MEDICAL HISTORY :       Past Medical History:        Diagnosis Date    Airway obstruction     Alcoholic polyneuropathy ([address]     Angina effort ([address]     Anxiety     [facility]     CAD (coronary artery disease)     s/p stents    Cancer (HCC)     breast, right    CHF (congestive heart failure) ([facility])     unspecified diastolic    Convulsions ([facility]     COPD (chronic obstructive pulmonary disease) ([facility])     Degenerative disc disease     Cervical    Depression     Depression     Diabetes mellitus ([address]     Esophageal reflux     Hypertension     unspecified    Inflammatory spondylopathy ([address]     Iron deficiency     Lymphoma ([address] 1993    MDRO (multiple drug resistant organisms) resistance 8/10/2014    E.  Coli urine    MRSA (methicillin resistant staph aureus) culture positive 07/13/2016    nares    Neuropathy     Osteoarthritis     Parkinson's disease ([address]     Peripheral vascular disease ([address]     Restless leg syndrome     Sleep apnea     Transient cerebral ischemia     Unsteady gait        Past Surgical History:        Procedure Laterality Date    BONE MARROW BIOPSY  11/29/2017    BREAST LUMPECTOMY      right     CHOLECYSTECTOMY      CORONARY ANGIOPLASTY WITH STENT PLACEMENT      unknown heart stents-1.5T MRI     FEMORAL-FEMORAL BYPASS GRAFT      HYSTERECTOMY      LUNG REMOVAL, PARTIAL      Lung CA        Social History: Historical Provider, MD   carvedilol (COREG) 6.25 MG tablet Take 6.25 mg by mouth 2 times daily (with meals)    Historical Provider, MD   gabapentin (NEURONTIN) 300 MG capsule Take 300 mg by mouth 2 times daily . Historical Provider, MD   carbidopa-levodopa (SINEMET)  MG per tablet Take 1 tablet by mouth 4 times daily     Historical Provider, MD   isosorbide mononitrate (IMDUR) 60 MG CR tablet Take 1 tablet by mouth daily. 3/25/15   Jens Ibarra MD   nitroGLYCERIN (NITROSTAT) 0.4 MG SL tablet Place 1 tablet under the tongue every 5 minutes as needed for Chest pain.  12/18/13   Jeff Veronica MD       Current Medications:   Current Facility-Administered Medications: apixaban (ELIQUIS) tablet 5 mg, 5 mg, Oral, BID  acetaminophen (TYLENOL) tablet 650 mg, 650 mg, Oral, Q4H PRN  nitroGLYCERIN (NITROSTAT) SL tablet 0.4 mg, 0.4 mg, Sublingual, Q5 Min PRN  isosorbide mononitrate (IMDUR) extended release tablet 60 mg, 60 mg, Oral, Daily  divalproex (DEPAKOTE) DR tablet 125 mg, 125 mg, Oral, TID  vitamin B-12 (CYANOCOBALAMIN) tablet 500 mcg, 500 mcg, Oral, Daily  carvedilol (COREG) tablet 6.25 mg, 6.25 mg, Oral, BID WC  gabapentin (NEURONTIN) capsule 300 mg, 300 mg, Oral, BID  carbidopa-levodopa (SINEMET)  MG per tablet 1 tablet, 1 tablet, Oral, 4x Daily  amLODIPine (NORVASC) tablet 5 mg, 5 mg, Oral, Daily  levothyroxine (SYNTHROID) tablet 25 mcg, 25 mcg, Oral, Daily  polyethylene glycol (GLYCOLAX) packet 17 g, 17 g, Oral, Daily PRN  DULoxetine (CYMBALTA) extended release capsule 90 mg, 90 mg, Oral, Daily  ALPRAZolam (XANAX) tablet 0.5 mg, 0.5 mg, Oral, TID PRN  budesonide (PULMICORT) nebulizer suspension 500 mcg, 500 mcg, Nebulization, Q12H PRN  ipratropium (ATROVENT) 0.02 % nebulizer solution 0.5 mg, 0.5 mg, Nebulization, 4x Daily PRN  aspirin chewable tablet 81 mg, 81 mg, Oral, Daily  mometasone-formoterol (DULERA) 200-5 MCG/ACT inhaler 2 puff, 2 puff, Inhalation, BID  pantoprazole (PROTONIX) tablet 40 mg, IX, X - symmetrical palate elevation   XI - 5/5 strength  XII - tongue midline   Motor function  5/5 in b/l upper and lower extremity  Normal muscle bulk. No increased tone  No upper extremity rigidity  Possibly some rigidity in her hips symmetric b/l  Tremor in both upper extremities- increases with movement, does not go away with distraction. Seems 4-5 hertz, coarse and ataxic   Sensory function Symmetric to touch   Cerebellar Dysmetria, dysdiadicochokinesia   Reflex function 2+ b/l symmetric in biceps, brachioradialis, patellar, calcaneal  babinski b/l downgoing   Gait                  Not assessed     LABS AND IMAGING:       Labs:  Depakote, and level on arrival was 20. Kappa/Lambda ratio 2.67. Found to be pancytopenic. Initial VBG ph 7.324; pCO2 62.5; HCO3 32.5. Repeated VBG with pH 7.26, CO2 83.3 oxygen 27.6    Imaging:    CT head -unremarkable  CTA Head and neck -with 70% R ICA stenosis. MRI brain -revealed no acute ischemia or hyperintensity to suggest recent seizure. T2 hyperintensity in cerebellar hemispheres bilaterally, possibly from prior ischemic insult or trauma. EEG - no epileptogenic focus or EEG seizures, but revealed diffuse encephalopathy. ASSESSMENT:     Stroke - like event - was likely episode of hypoxia and hypercarbia. She has had similar unresponsive presentations atleast twice in the past. She has stroke risk factors of atrial fibrillation, HTN, cancer, CAD, and has 70% R ICA stenosis. Metabolic Encephalopathy - likely 2/2 hypercarbia, hypoxia. NIHSS improved from 23 to 2 with NC oxygen, and she became more alert. NIHSS 2 likely baseline, and she has remained alert, orientated to person, place and time, and able to attend to spelling world backwards - resolved. No evidence to suggest this was a seizure, and EEG was not suggestive. Tremor, parkinsonism - Her tremor is coarse, bilateral, non-rhythmic, about ?4-5 hertz.  She does not have significant rigidity, but

## 2018-08-26 NOTE — PLAN OF CARE
Problem: Pain:  Goal: Pain level will decrease  Pain level will decrease   Outcome: Ongoing      Problem: Risk for Impaired Skin Integrity  Goal: Tissue integrity - skin and mucous membranes  Structural intactness and normal physiological function of skin and  mucous membranes.    Outcome: Ongoing      Problem: Falls - Risk of:  Goal: Will remain free from falls  Will remain free from falls   Outcome: Met This Shift

## 2018-08-27 LAB
ABSOLUTE EOS #: 0.14 K/UL (ref 0–0.44)
ABSOLUTE IMMATURE GRANULOCYTE: <0.03 K/UL (ref 0–0.3)
ABSOLUTE LYMPH #: 0.98 K/UL (ref 1.1–3.7)
ABSOLUTE MONO #: 0.37 K/UL (ref 0.1–1.2)
ALBUMIN (CALCULATED): 3.9 G/DL (ref 3.2–5.2)
ALBUMIN PERCENT: 64 % (ref 45–65)
ALPHA 1 PERCENT: 2 % (ref 3–6)
ALPHA 2 PERCENT: 9 % (ref 6–13)
ALPHA-1-GLOBULIN: 0.1 G/DL (ref 0.1–0.4)
ALPHA-2-GLOBULIN: 0.6 G/DL (ref 0.5–0.9)
ANION GAP SERPL CALCULATED.3IONS-SCNC: 9 MMOL/L (ref 9–17)
BASOPHILS # BLD: 1 % (ref 0–2)
BASOPHILS ABSOLUTE: <0.03 K/UL (ref 0–0.2)
BETA GLOBULIN: 0.6 G/DL (ref 0.5–1.1)
BETA PERCENT: 9 % (ref 11–19)
BUN BLDV-MCNC: 12 MG/DL (ref 8–23)
BUN/CREAT BLD: ABNORMAL (ref 9–20)
CALCIUM SERPL-MCNC: 8.5 MG/DL (ref 8.6–10.4)
CHLORIDE BLD-SCNC: 102 MMOL/L (ref 98–107)
CO2: 32 MMOL/L (ref 20–31)
CREAT SERPL-MCNC: 0.7 MG/DL (ref 0.5–0.9)
DIFFERENTIAL TYPE: ABNORMAL
EOSINOPHILS RELATIVE PERCENT: 4 % (ref 1–4)
FOLATE: 13.3 NG/ML
GAMMA GLOBULIN %: 16 % (ref 9–20)
GAMMA GLOBULIN: 1 G/DL (ref 0.5–1.5)
GFR AFRICAN AMERICAN: >60 ML/MIN
GFR NON-AFRICAN AMERICAN: >60 ML/MIN
GFR SERPL CREATININE-BSD FRML MDRD: ABNORMAL ML/MIN/{1.73_M2}
GFR SERPL CREATININE-BSD FRML MDRD: ABNORMAL ML/MIN/{1.73_M2}
GLUCOSE BLD-MCNC: 88 MG/DL (ref 70–99)
HCT VFR BLD CALC: 31.6 % (ref 36.3–47.1)
HEMOGLOBIN: 10.5 G/DL (ref 11.9–15.1)
IMMATURE GRANULOCYTES: 0 %
LYMPHOCYTES # BLD: 30 % (ref 24–43)
MCH RBC QN AUTO: 32.8 PG (ref 25.2–33.5)
MCHC RBC AUTO-ENTMCNC: 33.2 G/DL (ref 28.4–34.8)
MCV RBC AUTO: 98.8 FL (ref 82.6–102.9)
MONOCYTES # BLD: 11 % (ref 3–12)
NRBC AUTOMATED: 0 PER 100 WBC
PATHOLOGIST: ABNORMAL
PDW BLD-RTO: 16 % (ref 11.8–14.4)
PLATELET # BLD: 160 K/UL (ref 138–453)
PLATELET ESTIMATE: ABNORMAL
PMV BLD AUTO: 10.8 FL (ref 8.1–13.5)
POTASSIUM SERPL-SCNC: 4.3 MMOL/L (ref 3.7–5.3)
PROTEIN ELECTROPHORESIS, SERUM: ABNORMAL
RBC # BLD: 3.2 M/UL (ref 3.95–5.11)
RBC # BLD: ABNORMAL 10*6/UL
SEG NEUTROPHILS: 54 % (ref 36–65)
SEGMENTED NEUTROPHILS ABSOLUTE COUNT: 1.77 K/UL (ref 1.5–8.1)
SODIUM BLD-SCNC: 143 MMOL/L (ref 135–144)
SURGICAL PATHOLOGY REPORT: NORMAL
TOTAL PROT. SUM,%: 100 % (ref 98–102)
TOTAL PROT. SUM: 6.2 G/DL (ref 6.3–8.2)
TOTAL PROTEIN: 6.2 G/DL (ref 6.4–8.3)
VITAMIN B-12: 620 PG/ML (ref 232–1245)
WBC # BLD: 3.3 K/UL (ref 3.5–11.3)
WBC # BLD: ABNORMAL 10*3/UL

## 2018-08-27 PROCEDURE — 6370000000 HC RX 637 (ALT 250 FOR IP): Performed by: NURSE PRACTITIONER

## 2018-08-27 PROCEDURE — 99232 SBSQ HOSP IP/OBS MODERATE 35: CPT | Performed by: INTERNAL MEDICINE

## 2018-08-27 PROCEDURE — 6370000000 HC RX 637 (ALT 250 FOR IP): Performed by: PSYCHIATRY & NEUROLOGY

## 2018-08-27 PROCEDURE — 6360000002 HC RX W HCPCS: Performed by: NURSE PRACTITIONER

## 2018-08-27 PROCEDURE — 80048 BASIC METABOLIC PNL TOTAL CA: CPT

## 2018-08-27 PROCEDURE — 94762 N-INVAS EAR/PLS OXIMTRY CONT: CPT

## 2018-08-27 PROCEDURE — G8979 MOBILITY GOAL STATUS: HCPCS

## 2018-08-27 PROCEDURE — 94640 AIRWAY INHALATION TREATMENT: CPT

## 2018-08-27 PROCEDURE — 2580000003 HC RX 258: Performed by: NURSE PRACTITIONER

## 2018-08-27 PROCEDURE — 97162 PT EVAL MOD COMPLEX 30 MIN: CPT

## 2018-08-27 PROCEDURE — 97530 THERAPEUTIC ACTIVITIES: CPT

## 2018-08-27 PROCEDURE — 93975 VASCULAR STUDY: CPT

## 2018-08-27 PROCEDURE — 6370000000 HC RX 637 (ALT 250 FOR IP): Performed by: FAMILY MEDICINE

## 2018-08-27 PROCEDURE — 36415 COLL VENOUS BLD VENIPUNCTURE: CPT

## 2018-08-27 PROCEDURE — G8978 MOBILITY CURRENT STATUS: HCPCS

## 2018-08-27 PROCEDURE — 85025 COMPLETE CBC W/AUTO DIFF WBC: CPT

## 2018-08-27 PROCEDURE — 1200000000 HC SEMI PRIVATE

## 2018-08-27 RX ORDER — SODIUM CHLORIDE 9 MG/ML
INJECTION, SOLUTION INTRAVENOUS CONTINUOUS
Status: DISCONTINUED | OUTPATIENT
Start: 2018-08-27 | End: 2018-08-28 | Stop reason: HOSPADM

## 2018-08-27 RX ADMIN — CARBIDOPA AND LEVODOPA 1 TABLET: 25; 100 TABLET ORAL at 13:00

## 2018-08-27 RX ADMIN — APIXABAN 5 MG: 5 TABLET, FILM COATED ORAL at 20:16

## 2018-08-27 RX ADMIN — HYDRALAZINE HYDROCHLORIDE 10 MG: 20 INJECTION INTRAMUSCULAR; INTRAVENOUS at 20:16

## 2018-08-27 RX ADMIN — MOMETASONE FUROATE AND FORMOTEROL FUMARATE DIHYDRATE 2 PUFF: 200; 5 AEROSOL RESPIRATORY (INHALATION) at 08:34

## 2018-08-27 RX ADMIN — CARVEDILOL 6.25 MG: 12.5 TABLET, FILM COATED ORAL at 08:19

## 2018-08-27 RX ADMIN — SODIUM CHLORIDE: 9 INJECTION, SOLUTION INTRAVENOUS at 06:32

## 2018-08-27 RX ADMIN — DULOXETINE HYDROCHLORIDE 90 MG: 30 CAPSULE, DELAYED RELEASE ORAL at 08:19

## 2018-08-27 RX ADMIN — AMLODIPINE BESYLATE 5 MG: 10 TABLET ORAL at 08:19

## 2018-08-27 RX ADMIN — CARBIDOPA AND LEVODOPA 1 TABLET: 25; 100 TABLET ORAL at 08:19

## 2018-08-27 RX ADMIN — GABAPENTIN 300 MG: 300 CAPSULE ORAL at 08:18

## 2018-08-27 RX ADMIN — CLOPIDOGREL 75 MG: 75 TABLET, FILM COATED ORAL at 08:19

## 2018-08-27 RX ADMIN — ACETAMINOPHEN 650 MG: 325 TABLET ORAL at 20:20

## 2018-08-27 RX ADMIN — ALPRAZOLAM 0.5 MG: 0.5 TABLET ORAL at 20:19

## 2018-08-27 RX ADMIN — Medication 500 MCG: at 08:18

## 2018-08-27 RX ADMIN — CARBIDOPA AND LEVODOPA 1 TABLET: 25; 100 TABLET ORAL at 20:16

## 2018-08-27 RX ADMIN — GABAPENTIN 300 MG: 300 CAPSULE ORAL at 20:16

## 2018-08-27 RX ADMIN — ASPIRIN 81 MG 81 MG: 81 TABLET ORAL at 08:19

## 2018-08-27 RX ADMIN — CARVEDILOL 6.25 MG: 12.5 TABLET, FILM COATED ORAL at 16:52

## 2018-08-27 RX ADMIN — APIXABAN 5 MG: 5 TABLET, FILM COATED ORAL at 08:19

## 2018-08-27 RX ADMIN — PANTOPRAZOLE SODIUM 40 MG: 40 TABLET, DELAYED RELEASE ORAL at 08:18

## 2018-08-27 RX ADMIN — LEVOTHYROXINE SODIUM 25 MCG: 25 TABLET ORAL at 06:39

## 2018-08-27 RX ADMIN — CARBIDOPA AND LEVODOPA 1 TABLET: 25; 100 TABLET ORAL at 16:52

## 2018-08-27 RX ADMIN — ISOSORBIDE MONONITRATE 60 MG: 60 TABLET ORAL at 08:18

## 2018-08-27 ASSESSMENT — PAIN SCALES - GENERAL: PAINLEVEL_OUTOF10: 5

## 2018-08-27 NOTE — PROGRESS NOTES
Pt stated she has had diarrhea today and requesting immodium. Did you want to put an order in for it?     Perfect S[employer] Shey Castro NP about above    Orders Recieved

## 2018-08-27 NOTE — CONSULTS
with long heavy smoking Hx. Currently smoking 1-2 cigarettes per day. Dr. Hulda Cushing nuerologist outpatient. [facility] outpatient. Palliative care consulted for goals of care and code status discussion. Patient would like to make her nephew Lacey Nunez her POA. She currently does not have a living will. Patient voiced that under certain circumstances she would like to be full code and other circumstances she would like to be [facility]. She was glad that she was brought to the hospital for this current episode, but does not want to come to the hospital frequently. Niece was in the room during our discussion. 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 [Z86.73]     Unresponsive episode [R41.89] 08/24/2018    Hypoxia [R09.02]     Pancytopenia ([address] [D61.818]     COPD exacerbation ([address] [J44.1] 02/26/2018    Essential hypertension [I10]     Carotid stenosis [I65.29] 12/21/2014    CAD (coronary artery disease) [I25.10] 03/22/2014    Depression [F32.9] 12/15/2013       PAST MEDICAL HISTORY      Diagnosis Date    Airway obstruction     Alcoholic polyneuropathy ([address]     Angina effort ([address]     Anxiety     Atrial fibrillation ([address]     CAD (coronary artery disease)     s/p stents    Cancer (HCC)     breast, right    CHF (congestive heart failure) (HCC)     unspecified diastolic    Convulsions (HCC)     COPD (chronic obstructive pulmonary disease) (HCC)     Degenerative disc disease     Cervical    Depression     Depression     Diabetes mellitus ([address]     Esophageal reflux     Hypertension     unspecified    Inflammatory spondylopathy ([address]     Iron deficiency     Lymphoma ([address] 1993    MDRO (multiple drug resistant organisms) resistance 8/10/2014    E.  Coli urine    MRSA (methicillin resistant staph aureus) culture positive 07/13/2016 nares    Neuropathy     Osteoarthritis     [facility]     Peripheral vascular disease [facility]     Restless leg syndrome     Sleep apnea     Transient cerebral ischemia     Unsteady gait        PAST SURGICAL HISTORY  Past Surgical History:   Procedure Laterality Date    BONE MARROW BIOPSY  11/29/2017    BREAST LUMPECTOMY      right     CHOLECYSTECTOMY      CORONARY ANGIOPLASTY WITH STENT PLACEMENT      unknown heart stents-1.5T MRI     FEMORAL-FEMORAL BYPASS GRAFT      HYSTERECTOMY      LUNG REMOVAL, PARTIAL      Lung CA        SOCIAL HISTORY  Social History   Substance Use Topics    Smoking status: Current Every Day Smoker     Packs/day: 0.50     Types: Cigarettes    Smokeless tobacco: Never Used    Alcohol use No       ALLERGIES  Allergies   Allergen Reactions    Naprosyn [Naproxen] Hives    Actonel [Risedronate Sodium]     Albuterol Sulfate     Atenolol     Codeine Other (See Comments)     unknown reaction    Cortisone     Requip [Ropinirole Hcl]          MEDICATIONS  Current Medications    apixaban  5 mg Oral BID    isosorbide mononitrate  60 mg Oral Daily    vitamin B-12  500 mcg Oral Daily    carvedilol  6.25 mg Oral BID WC    gabapentin  300 mg Oral BID    carbidopa-levodopa  1 tablet Oral 4x Daily    amLODIPine  5 mg Oral Daily    levothyroxine  25 mcg Oral Daily    DULoxetine  90 mg Oral Daily    aspirin  81 mg Oral Daily    mometasone-formoterol  2 puff Inhalation BID    pantoprazole  40 mg Oral Daily    sodium chloride flush  10 mL Intravenous BID    pneumococcal 13-valent conjugate  0.5 mL Intramuscular Once    clopidogrel  75 mg Oral Daily     hydrALAZINE, loperamide, acetaminophen, nitroGLYCERIN, polyethylene glycol, ALPRAZolam, budesonide, ipratropium  IV Drips/Infusions   sodium chloride 50 mL/hr at 08/27/18 0079     Home Medications  No current facility-administered medications on file prior to encounter.       Current Outpatient Prescriptions family    Patient ok to discharge back to NH when ok by primary team. Appointment made for palliative care clinic 9/19 @ 10 am to further discuss goals of care and for symptom management. Please call with any palliative questions or concerns. Palliative Care Team is available via perfect serve or via phone. Palliative Care will continue to follow Ms. Allison's care as needed. This note has been dictated by dragon, typing errors may be a possibility. Thank you for allowing Palliative Care to participate in the care of Ms. Rissa Simon .     Electronically signed by   Blayne Almanza MD  Palliative Care Team  on 8/27/2018 at 11:36 AM    Palliative care office: [phone]

## 2018-08-27 NOTE — PLAN OF CARE
Problem: Pain:  Goal: Pain level will decrease  Pain level will decrease   Outcome: Met This Shift    Goal: Control of acute pain  Control of acute pain   Outcome: Met This Shift    Goal: Control of chronic pain  Control of chronic pain   Outcome: Met This Shift      Problem: Risk for Impaired Skin Integrity  Goal: Tissue integrity - skin and mucous membranes  Structural intactness and normal physiological function of skin and  mucous membranes.    Outcome: Ongoing      Problem: Falls - Risk of:  Goal: Will remain free from falls  Will remain free from falls   Outcome: Met This Shift    Goal: Absence of physical injury  Absence of physical injury   Outcome: Met This Shift      Problem: Nutrition  Goal: Optimal nutrition therapy  Outcome: Ongoing

## 2018-08-27 NOTE — PROGRESS NOTES
244 Pt has voided 300mL since 7:30pm, she has been sleeping most of the night and has not drank much. Any orders?     Perfect Served Maribeth Waters about above    Orders Recieved

## 2018-08-27 NOTE — PROGRESS NOTES
Writer notified [employer] NP via perfect Serve that pts code status states \"prior\", and that a list of full codes show.  [employer] NP said pt is full code for now

## 2018-08-27 NOTE — PROGRESS NOTES
/71, pulse 78. I wanted you to be aware, any orders?     Perfect Served Venus Fink NP about above    Orders Recieved

## 2018-08-27 NOTE — PROGRESS NOTES
Date:                           8/27/2018  Patient name:           Cintia Navarrete  Date of admission:  8/24/2018  5:41 AM  MRN:   [id number]  YOB: 1940  PCP:                           No primary care provider on file. Subjective:   Pt seen and examined. Labs and vitals personally reviewed. Patient complains of abdominal pain and poor oral intake. Awaiting evaluation abdominal pain. Blood cell count stable. Denies fever chills. REVIEW OF SYSTEMS:  General: no fever or night sweats, Weight is stable. ENT: No double or blurred vision, no hearing problem, no dysphagia or sore throat   Respiratory: No chest pain, no shortness of breath, no cough or hemoptysis. Cardiovascular: Denies chest pain, PND or orthopnea. No L E swelling or palpitations. Gastrointestinal:    Positive for nausea and abdominal discomfort  Genitourinary: Denies dysuria, hematuria, frequency, urgency or incontinence. Neurological: Denies headaches, decreased LOC, no sensory or motor focal deficits. Musculoskeletal:  No arthralgia no back pain or joint swelling. Skin: There are no rashes or bleeding.   Psych: Denies hallucinations or intentions to harm self        Objective:     Vitals: BP (!) 131/56   Pulse 68   Temp 97.6 °F (36.4 °C) (Oral)   Resp 16   Ht 4' 11\" (1.499 m)   Wt 119 lb 6.4 oz (54.2 kg)   SpO2 100%   BMI 24.12 kg/m²   General appearance - well appearing, no in pain or distress  Mental status - AAO X3  Eyes - pupils equal and reactive, extraocular eye movements intact  Mouth - mucous membranes moist, pharynx normal without lesions  Neck - supple, no significant adenopathy  Lymphatics - no palpable lymphadenopathy, no hepatosplenomegaly  Chest - clear to auscultation, no wheezes, rales or rhonchi, symmetric air entry  Heart - normal rate, regular rhythm, normal S1, S2, no murmurs  Abdomen - soft, nontender, nondistended, no masses or organomegaly  Neurological - alert, oriented, normal speech, no focal findings or movement disorder noted  Extremities - peripheral pulses normal, no pedal edema, no clubbing or cyanosis  Skin - normal coloration and turgor, no rashes, no suspicious skin lesions noted         Data:    No intake/output data recorded. In: 360 [P.O.:360]  Out: 300 [Urine:300]    CBC:   Recent Labs      08/25/18   0524  08/27/18   0520   WBC  2.8*  3.3*   HGB  10.4*  10.5*   PLT  121*  160     BMP:    Recent Labs      08/25/18   0524  08/27/18   0520   NA  141  143   K  4.3  4.3   CL  101  102   CO2  31  32*   BUN  17  12   CREATININE  0.66  0.70   GLUCOSE  88  88     Hepatic:   No results for input(s): AST, ALT, ALB, BILITOT, ALKPHOS in the last 72 hours. INR:   No results for input(s): INR in the last 72 hours. PTT:No results for input(s): PTT in the last 72 hours.     Results for orders placed or performed during the hospital encounter of 08/24/18   CBC auto differential   Result Value Ref Range    WBC 2.8 (L) 3.5 - 11.3 k/uL    RBC 3.15 (L) 3.95 - 5.11 m/uL    Hemoglobin 10.3 (L) 11.9 - 15.1 g/dL    Hematocrit 32.7 (L) 36.3 - 47.1 %    .8 (H) 82.6 - 102.9 fL    MCH 32.7 25.2 - 33.5 pg    MCHC 31.5 28.4 - 34.8 g/dL    RDW 17.1 (H) 11.8 - 14.4 %    Platelets 647 (L) 334 - 453 k/uL    MPV 10.7 8.1 - 13.5 fL    NRBC Automated 0.0 0.0 per 100 WBC    Differential Type NOT REPORTED     Seg Neutrophils 51 36 - 65 %    Lymphocytes 31 24 - 43 %    Monocytes 13 (H) 3 - 12 %    Eosinophils % 4 1 - 4 %    Basophils 1 0 - 2 %    Immature Granulocytes 0 0 %    Segs Absolute 1.43 (L) 1.50 - 8.10 k/uL    Absolute Lymph # 0.87 (L) 1.10 - 3.70 k/uL    Absolute Mono # 0.38 0.10 - 1.20 k/uL    Absolute Eos # 0.12 0.00 - 0.44 k/uL    Basophils # 0.03 0.00 - 0.20 k/uL    Absolute Immature Granulocyte <0.03 0.00 - 0.30 k/uL    WBC Morphology NOT REPORTED     RBC Morphology ANISOCYTOSIS PRESENT     Platelet Estimate NOT REPORTED    Comprehensive Metabolic Panel w/ Reflex to MG Result Value Ref Range    Glucose 88 70 - 99 mg/dL    BUN 26 (H) 8 - 23 mg/dL    CREATININE 0.99 (H) 0.50 - 0.90 mg/dL    Bun/Cre Ratio NOT REPORTED 9 - 20    Calcium 8.1 (L) 8.6 - 10.4 mg/dL    Sodium 139 135 - 144 mmol/L    Potassium 4.4 3.7 - 5.3 mmol/L    Chloride 101 98 - 107 mmol/L    CO2 31 20 - 31 mmol/L    Anion Gap 7 (L) 9 - 17 mmol/L    Alkaline Phosphatase 53 35 - 104 U/L    ALT <5 (L) 5 - 33 U/L    AST 14 <32 U/L    Total Bilirubin 0.25 (L) 0.3 - 1.2 mg/dL    Total Protein 6.4 6.4 - 8.3 g/dL    Alb 3.4 (L) 3.5 - 5.2 g/dL    Albumin/Globulin Ratio 1.1 1.0 - 2.5    GFR Non-African American 55 (L) >60 mL/min    GFR African American >60 >60 mL/min    GFR Comment          GFR Staging NOT REPORTED    Troponin   Result Value Ref Range    Troponin T <0.03 <0.03 ng/mL    Troponin Interp         Protime-INR   Result Value Ref Range    Protime 10.0 9.0 - 12.0 sec    INR 0.9    Urinalysis   Result Value Ref Range    Color, UA YELLOW YEL    Turbidity UA CLEAR CLEAR    Glucose, Ur NEGATIVE NEG    Bilirubin Urine NEGATIVE NEG    Ketones, Urine NEGATIVE NEG    Specific Gravity, UA 1.034 (H) 1.005 - 1.030    Urine Hgb NEGATIVE NEG    pH, UA 5.5 5.0 - 8.0    Protein, UA NEGATIVE NEG    Urobilinogen, Urine Normal NORM    Nitrite, Urine NEGATIVE NEG    Leukocyte Esterase, Urine NEGATIVE NEG    Urinalysis Comments       Microscopic exam not performed based on chemical results unless requested in   Ethanol   Result Value Ref Range    Ethanol <10 <10 mg/dL    Ethanol percent <0.010 %   Urine Drug Screen   Result Value Ref Range    Amphetamine Screen, Ur NEGATIVE NEG    Barbiturate Screen, Ur NEGATIVE NEG    Benzodiazepine Screen, Urine POSITIVE (A) NEG    Cocaine Metabolite, Urine NEGATIVE NEG    Methadone Screen, Urine NEGATIVE NEG    Opiates, Urine NEGATIVE NEG    Phencyclidine, Urine NEGATIVE NEG    Propoxyphene, Urine NOT REPORTED NEG    Cannabinoid Scrn, Ur NEGATIVE NEG    Oxycodone Screen, Ur NEGATIVE NEG Methamphetamine, Urine NOT REPORTED NEG    Tricyclic Antidepressants, Urine NOT REPORTED NEG    MDMA, Urine NOT REPORTED NEG    Buprenorphine Urine NOT REPORTED NEG    Test Information       Assay provides medical screening only.   The absence of expected drug(s) and/or   Hemoglobin and hematocrit, blood   Result Value Ref Range    POC Hemoglobin 10.2 (L) 12.0 - 16.0 g/dL    POC Hematocrit 30 (L) 36 - 46 %   SODIUM (POC)   Result Value Ref Range    POC Sodium 142 138 - 146 mmol/L   POTASSIUM (POC)   Result Value Ref Range    POC Potassium 4.3 3.5 - 4.5 mmol/L   CHLORIDE (POC)   Result Value Ref Range    POC Chloride 103 98 - 107 mmol/L   CALCIUM, IONIC (POC)   Result Value Ref Range    POC Ionized Calcium 1.05 (L) 1.15 - 1.33 mmol/L   Lactic Acid, Whole Blood   Result Value Ref Range    Lactic Acid, Whole Blood 1.1 0.7 - 2.1 mmol/L   Valproic acid level, total   Result Value Ref Range    Valproic Acid Lvl 20 (L) 50 - 125 ug/mL    Valproic Dose amount NOT REPORTED     Valproic Date last dose NOT REPORTED     Valproic Time last dose NOT REPORTED    AMMONIA   Result Value Ref Range    Ammonia <10 (L) 11 - 51 umol/L   Hemoglobin and hematocrit, blood   Result Value Ref Range    POC Hemoglobin 10.0 (L) 12.0 - 16.0 g/dL    POC Hematocrit 29 (L) 36 - 46 %   SODIUM (POC)   Result Value Ref Range    POC Sodium 142 138 - 146 mmol/L   POTASSIUM (POC)   Result Value Ref Range    POC Potassium 4.4 3.5 - 4.5 mmol/L   CHLORIDE (POC)   Result Value Ref Range    POC Chloride 100 98 - 107 mmol/L   CALCIUM, IONIC (POC)   Result Value Ref Range    POC Ionized Calcium 1.14 (L) 1.15 - 1.33 mmol/L   Basic Metabolic Panel w/ Reflex to MG   Result Value Ref Range    Glucose 88 70 - 99 mg/dL    BUN 17 8 - 23 mg/dL    CREATININE 0.66 0.50 - 0.90 mg/dL    Bun/Cre Ratio NOT REPORTED 9 - 20    Calcium 8.1 (L) 8.6 - 10.4 mg/dL    Sodium 141 135 - 144 mmol/L    Potassium 4.3 3.7 - 5.3 mmol/L    Chloride 101 98 - 107 mmol/L    CO2 31 20 - 31 mmol/L Anion Gap 9 9 - 17 mmol/L    GFR Non-African American >60 >60 mL/min    GFR African American >60 >60 mL/min    GFR Comment          GFR Staging NOT REPORTED    CBC   Result Value Ref Range    WBC 2.8 (L) 3.5 - 11.3 k/uL    RBC 3.26 (L) 3.95 - 5.11 m/uL    Hemoglobin 10.4 (L) 11.9 - 15.1 g/dL    Hematocrit 32.9 (L) 36.3 - 47.1 %    .9 82.6 - 102.9 fL    MCH 31.9 25.2 - 33.5 pg    MCHC 31.6 28.4 - 34.8 g/dL    RDW 16.5 (H) 11.8 - 14.4 %    Platelets 409 (L) 072 - 453 k/uL    MPV 10.6 8.1 - 13.5 fL    NRBC Automated 0.0 0.0 per 100 WBC   C-Reactive Protein   Result Value Ref Range    CRP 2.6 0.0 - 5.0 mg/L   Procalcitonin   Result Value Ref Range    Procalcitonin 0.02 <0.09 ng/mL   BLOOD GAS, VENOUS   Result Value Ref Range    pH, Trevor 7.316 (L) 7.320 - 7.420    pCO2, Trevor 68.8 (H) 39 - 55    pO2, Trevor 35.5 30 - 50    HCO3, Venous 34.1 (H) 24 - 30 mmol/L    Positive Base Excess, Trevor 6.7 (H) 0.0 - 2.0 mmol/L    Negative Base Excess, Trevor NOT REPORTED 0.0 - 2.0 mmol/L    O2 Sat, Trevor 58.6 (L) 60.0 - 85.0 %    Total Hb NOT REPORTED 12.0 - 16.0 g/dl    Oxyhemoglobin NOT REPORTED 95.0 - 98.0 %    Carboxyhemoglobin 1.8 0 - 5 %    Methemoglobin NOT REPORTED 0.0 - 1.5 %    Pt Temp 37.0     pH, Trevor, Temp Adj NOT REPORTED 7.320 - 7.420    pCO2, Trevor, Temp Adj NOT REPORTED 39 - 55 mmHg    pO2, Trevor, Temp Adj NOT REPORTED 30 - 50 mmHg    O2 Device/Flow/% NOT REPORTED     Respiratory Rate NOT REPORTED     Yaw Test NOT REPORTED     Sample Site NOT REPORTED     Pt.  Position NOT REPORTED     Mode NOT REPORTED     Set Rate NOT REPORTED     Total Rate NOT REPORTED     VT NOT REPORTED     FIO2 INFORMATION NOT PROVIDED     Peep/Cpap NOT REPORTED     PSV NOT REPORTED     Text for Respiratory NOT REPORTED     NOTIFICATION NOT REPORTED     NOTIFICATION TIME NOT REPORTED    Vitamin B12 & Folate   Result Value Ref Range    Vitamin B-12 Pending pg/mL    Folate 13.3 >4.8 ng/mL   Electrophoresis Protein, Serum without Reflex to REPORTED     POC pH Temp NOT REPORTED     POC pCO2 Temp NOT REPORTED mm Hg    POC pO2 Temp NOT REPORTED mm Hg   Creatinine W/GFR Point of Care   Result Value Ref Range    POC Creatinine 1.15 0.51 - 1.19 mg/dL    GFR Comment 55 (L) >60 mL/min    GFR Non- 46 (L) >60 mL/min    GFR Comment         Lactic Acid, POC   Result Value Ref Range    POC Lactic Acid 0.92 0.56 - 1.39 mmol/L   POCT Glucose   Result Value Ref Range    POC Glucose 84 74 - 100 mg/dL   Anion Gap (Calc) POC   Result Value Ref Range    Anion Gap 7 7 - 16 mmol/L   POCT troponin   Result Value Ref Range    POC Troponin I 0.00 0.00 - 0.10 ng/mL    POC Troponin Interp       The Troponin-I (POC) results cannot be compared to the Troponin-T results. Venous Blood Gas, POC   Result Value Ref Range    pH, Trevor 7.260 (L) 7.320 - 7.430    pCO2, Trevor 83.3 (HH) 41.0 - 51.0 mm Hg    pO2, Trevor 27.6 (L) 30.0 - 50.0 mm Hg    HCO3, Venous 37.3 (H) 22.0 - 29.0 mmol/L    Total CO2, Venous 40 (H) 23.0 - 30.0 mmol/L    Negative Base Excess, Trevor NOT REPORTED 0.0 - 2.0    Positive Base Excess, Trevor 8 (H) 0.0 - 3.0    O2 Sat, Trevor 40 (L) 60.0 - 85.0 %    O2 Device/Flow/% NOT REPORTED     Yaw Test NOT REPORTED     Sample Site NOT REPORTED     Mode NOT REPORTED     FIO2 NOT REPORTED     Pt Temp NOT REPORTED     POC pH Temp NOT REPORTED     POC pCO2 Temp NOT REPORTED mm Hg    POC pO2 Temp NOT REPORTED mm Hg   Creatinine W/GFR Point of Care   Result Value Ref Range    POC Creatinine Result not available.  0.51 - 1.19 mg/dL    GFR Comment CANNOT BE CALCULATED >60 mL/min    GFR Non- CANNOT BE CALCULATED >60 mL/min    GFR Comment         Lactic Acid, POC   Result Value Ref Range    POC Lactic Acid 0.64 0.56 - 1.39 mmol/L   POCT Glucose   Result Value Ref Range    POC Glucose 76 74 - 100 mg/dL   Anion Gap (Calc) POC   Result Value Ref Range    Anion Gap 5 (L) 7 - 16 mmol/L   EKG 12 lead   Result Value Ref Range    Ventricular Rate 80 BPM    Atrial Rate 80 BPM    P-R Interval 138 ms    QRS Duration 78 ms    Q-T Interval 360 ms    QTc Calculation (Bazett) 415 ms    P Axis 74 degrees    R Axis 75 degrees    T Axis 80 degrees       Ct Head Wo Contrast    Result Date: 8/24/2018  EXAMINATION: CT OF THE HEAD WITHOUT CONTRAST  8/24/2018 6:08 am TECHNIQUE: CT of the head was performed without the administration of intravenous contrast. Dose modulation, iterative reconstruction, and/or weight based adjustment of the mA/kV was utilized to reduce the radiation dose to as low as reasonably achievable. COMPARISON: May 5, 2018 HISTORY: ORDERING SYSTEM PROVIDED HISTORY: MENTAL STATUS CHANGE (AFTER TRAUMA) TECHNOLOGIST PROVIDED HISTORY: FINDINGS: BRAIN/VENTRICLES: There is no acute intracranial hemorrhage, mass effect or midline shift. The gray-white differentiation is maintained without evidence of an acute infarct. There is no evidence of hydrocephalus. Right greater than left basal ganglia and cerebellar chronic lacunar infarcts present. Patchy cerebral white matter hypoattenuation. Prominent extra-axial spaces along the biparietal convexity again noted. ORBITS: The visualized portion of the orbits demonstrate no acute abnormality. SINUSES: The visualized paranasal sinuses and mastoid air cells demonstrate no acute abnormality. SOFT TISSUES/SKULL:  No acute abnormality of the visualized skull or soft tissues. No acute intracranial abnormality. Stable chronic microangiopathic ischemic changes and scattered chronic lacunar infarcts. Findings were conveyed to Dr. Meme Mosqueda At 6:18 am on 8/24/2018. Ct Abdomen Pelvis W Iv Contrast Additional Contrast? None    Result Date: 8/25/2018  EXAMINATION: CT OF THE ABDOMEN AND PELVIS WITH CONTRAST 8/25/2018 1:25 pm TECHNIQUE: CT of the abdomen and pelvis was performed with the administration of intravenous contrast. Multiplanar reformatted images are provided for review.  Dose modulation, iterative reconstruction, and/or weight based adjustment of the mA/kV was utilized to reduce the radiation dose to as low as reasonably achievable. COMPARISON: CT abdomen and pelvis February 9, 2016. HISTORY: ORDERING SYSTEM PROVIDED HISTORY: ABDOMINAL PAIN TECHNOLOGIST PROVIDED HISTORY: FINDINGS: Lower Chest: Mild bibasilar scarring. No pericardial or pleural effusions. Organs: Liver, portal vein, pancreas and adrenal glands all appear unremarkable. Gallbladder is been surgically removed. Stable benign-appearing cystic lesion is noted involving the spleen measuring 2.2 cm in greatest axial dimension. Kidneys demonstrate no evidence of stone or hydronephrosis. There is severe calcification of the abdominal aorta and its major branches without aneurysm. GI/Bowel: Stomach is grossly unremarkable. Small bowel appears nondilated. Sigmoid and descending colon diverticulosis is noted. No acute colonic abnormality is seen. Appendix is not clearly identified. Pelvis: Urinary bladder is grossly unremarkable. Fem-fem bypass graft is noted. Uterus has been surgically removed. No adnexal mass or cyst. Peritoneum/Retroperitoneum: No free air, free fluid or lymphadenopathy. Bones/Soft Tissues: Abdominal wall demonstrates no acute findings. Osseous structures demonstrate degenerative change. There appears to be vertebroplasty changes involving the right sacral ala. 1. No acute intra-abdominal process. 2. Stable benign-appearing cystic lesion involving the spleen measuring 2.2 cm. 3. Sigmoid descending colon diverticulosis. 4. Severe vascular calcifications. Xr Chest Portable    Result Date: 8/24/2018  EXAMINATION: SINGLE XRAY VIEW OF THE CHEST 8/24/2018 6:09 am COMPARISON: 05/05/2018 HISTORY: ORDERING SYSTEM PROVIDED HISTORY: stroke alert TECHNOLOGIST PROVIDED HISTORY: stroke alert FINDINGS: Right internal jugular infusion port terminating right atrium unchanged. Normal cardiomediastinal silhouette.   Elevation of right hemidiaphragm with minor scarring at TISSUES: Emphysematous changes of the lung apices. Diminutive right thyroid lobe BONES: The visualized osseous structures appear unremarkable. CTA HEAD: ANTERIOR CIRCULATION: Carotid siphon calcifications without significant stenosis. The anterior cerebral and middle cerebral arteries appear patent. POSTERIOR CIRCULATION: The posterior cerebral arteries demonstrate no focal stenosis. The vertebral and basilar arteries appear unremarkable. BRAIN: No mass effect or midline shift. No abnormal extra-axial fluid collection. The gray-white differentiation appears grossly maintained. Nondominant right vertebral artery multifocal mild stenosis due to atheromatous plaques. Right internal carotid artery origin 70% stenosis by NASCET criteria. Patent left ICA. Intracranial arteries without acute findings. Findings were conveyed to Dr. Darwin Pittman At 7:00 am on 8/24/2018. Us Abdomen Limited    Result Date: 8/25/2018  EXAMINATION: RIGHT UPPER QUADRANT ULTRASOUND 8/25/2018 4:07 pm COMPARISON: CT abdomen and pelvis performed earlier today. HISTORY: ORDERING SYSTEM PROVIDED HISTORY: To evaluate for hepatosplenomegaly. TECHNOLOGIST PROVIDED HISTORY: Patient has splenomegaly and hypodense lesion on previous CT scan To evaluate for hepatosplenomegaly. FINDINGS: LIVER:  The liver demonstrates normal echogenicity without evidence of intrahepatic biliary ductal dilatation. No focal mass. Hepatopetal flow seen within the portal vein. BILIARY SYSTEM:  Gallbladder has been surgically removed. Common bile duct is within normal limits measuring 6.2 mm. SPLEEN: The spleen is normal in size measuring 12 cm with a benign-appearing cystic lesion as seen on the prior CT study measuring 2.5 cm in greatest dimension. No surrounding free fluid. PANCREAS:  Not visualized due to overlying bowel gas. OTHER: No evidence of right upper quadrant ascites. 1.   No ultrasound evidence of hepatosplenomegaly.  2. Benign cyst is seen within the

## 2018-08-27 NOTE — PROGRESS NOTES
Endurance Training, Gait Training, Transfer Training, ROM  Safety Devices  Type of devices: Bed alarm in place, Call light within reach, Gait belt, Left in bed  Restraints  Initially in place: No    G-Code  PT G-Codes  Functional Assessment Tool Used: [place] AM-PAC  Score: 18  Mobility: Walking and Moving Around Current Status (): At least 40 percent but less than 60 percent impaired, limited or restricted  Mobility: Walking and Moving Around Goal Status (): At least 20 percent but less than 40 percent impaired, limited or restricted  OutComes Score    AM-PAC Score  AM-PAC Inpatient Mobility Raw Score : 18  AM-PAC Inpatient T-Scale Score : 43.63  Mobility Inpatient CMS 0-100% Score: 46.58  Mobility Inpatient CMS G-Code Modifier : CK     Goals  Short term goals  Time Frame for Short term goals: 10 visits  Short term goal 1: pt to amb. 300 ft using RW CGA  Short term goal 2: pt to perform bed mobility supervision  Short term goal 3: pt to perform transfers supervision  Short term goal 4: pt to tolerate 30 minutes of therapeutic exercise  Patient Goals   Patient goals : return to [address] [place] [address] 200 & 300 Time   Individual Concurrent Group Co-treatment   Time In 1316         Time Out 1340         Minutes 24             1 of [address] [place] [address]  Evaluation/treatment performed by Student PT under the supervision of co-signing PT who agrees with all evaluation/treatment and documentation.

## 2018-08-27 NOTE — PROGRESS NOTES
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 May be 2/2 falls? No h/o brain bleeds known  Patient was hypoxic initially and disoriented then improved. She was admitted for further management    Review of Systems:     Negative except for those highlighted:    CONSTITUTIONAL:  fevers, chills, sweats, fatigue, weight loss, generalized weakness  HEENT:  Vision changes, hearing changes, runny nose, throat pain  RESPIRATORY:  shortness of breath, cough, congestion, wheezing. CARDIOVASCULAR:  chest pain, palpitations  GASTROINTESTINAL:  nausea, vomiting, diarrhea, constipation, change in bowel habits, abdominal pain   GENITOURINARY:  difficulty of urination, burning with urination, frequency   INTEGUMENT:  rash, skin lesions, easy bruising   HEMATOLOGIC/LYMPHATIC:  swelling/edema   ALLERGIC/IMMUNOLOGIC:  urticaria , itching  ENDOCRINE:  increase in drinking, increase in urination, hot or cold intolerance  MUSCULOSKELETAL:  joint pains, muscle aches, swelling of joints  NEUROLOGICAL:  headaches, dizziness, lightheadedness, numbness, pain, tingling extremities  BEHAVIOR/PSYCH:  depression, anxiety    Medications: Allergies:     Allergies   Allergen Reactions    Naprosyn [Naproxen] Hives    Actonel [Risedronate Sodium]     Albuterol Sulfate     Atenolol     Codeine Other (See Comments)     unknown reaction    Cortisone     Requip [Ropinirole Hcl]        Current Meds:   Scheduled Meds:    apixaban  5 mg Oral BID    isosorbide mononitrate  60 mg Oral Daily    vitamin B-12  500 mcg Oral Daily    carvedilol  6.25 mg Oral BID WC    gabapentin  300 mg Oral BID    carbidopa-levodopa  1 tablet Oral 4x Daily    amLODIPine  5 mg Oral Daily    levothyroxine  25 mcg Oral Daily    DULoxetine  90 mg Oral Daily    aspirin  81 mg Oral Daily    mometasone-formoterol  2 puff Inhalation BID    pantoprazole  40 mg Oral Daily    sodium chloride flush  10 mL Intravenous BID    pneumococcal 13-valent conjugate  0.5 mL Intramuscular Once    clopidogrel  75 mg Oral Daily     Continuous Infusions:    sodium chloride       PRN Meds: hydrALAZINE, loperamide, acetaminophen, nitroGLYCERIN, polyethylene glycol, ALPRAZolam, budesonide, ipratropium    Data:     Past Medical History:   has a past medical history of Airway obstruction; Alcoholic polyneuropathy ([facility] Angina effort ([facility] Anxiety; [facility] CAD (coronary artery disease); Cancer [facility]); CHF (congestive heart failure) ([facility] Convulsions ([facility] COPD (chronic obstructive pulmonary disease) ([facility] Degenerative disc disease; Depression; Depression; Diabetes mellitus ([facility] Esophageal reflux; Hypertension; Inflammatory spondylopathy ([facility] Iron deficiency; Lymphoma [facility] MDRO (multiple drug resistant organisms) resistance; MRSA (methicillin resistant staph aureus) culture positive; Neuropathy; Osteoarthritis; Parkinson's disease ([facility] Peripheral vascular disease ([facility] Restless leg syndrome; Sleep apnea; Transient cerebral ischemia; and Unsteady gait. Social History:   reports that she has been smoking Cigarettes. She has been smoking about 0.50 packs per day. She has never used smokeless tobacco. She reports that she does not drink alcohol or use drugs. Family History:   Family History   Problem Relation Age of Onset    Kidney Disease Mother     Cancer Mother     Heart Disease Father     Coronary Art Dis Sister        Vitals:  BP (!) 131/56   Pulse 68   Temp 97.6 °F (36.4 °C) (Oral)   Resp 16   Ht 4' 11\" (1.499 m)   Wt 119 lb 6.4 oz (54.2 kg)   SpO2 100%   BMI 24.12 kg/m²   Temp (24hrs), Av.3 °F (36.8 °C), Min:97.6 °F (36.4 °C), Max:98.7 °F (37.1 °C)    Recent Labs      18   0712   POCGLU  76       I/O (24Hr):     Intake/Output Summary (Last 24 hours) at 18 3492  Last data filed at 18 8155   Gross per 24 hour   Intake              360 ml   Output 300 ml   Net               60 ml       Labs:    Hematology:  Recent Labs      08/25/18   0524 08/27/18   0520   WBC  2.8*  3.3*   RBC  3.26*  3.20*   HGB  10.4*  10.5*   HCT  32.9*  31.6*   MCV  100.9  98.8   MCH  31.9  32.8   MCHC  31.6  33.2   RDW  16.5*  16.0*   PLT  121*  160   MPV  10.6  10.8   CRP  2.6   --      Chemistry:  Recent Labs      08/24/18   0712  08/25/18 0524  08/27/18   0520   NA   --   141  143   K   --   4.3  4.3   CL   --   101  102   CO2   --   31  32*   GLUCOSE   --   88  88   BUN   --   17  12   CREATININE  Result not available. 0.66  0.70   ANIONGAP   --   9  9   LABGLOM  CANNOT BE CALCULATED  >60  >60   GFRAA   --   >60  >60   CALCIUM   --   8.1*  8.5*     Recent Labs      08/24/18 0712   AMMONIA  <10*   POCGLU  76         Lab Results   Component Value Date/Time    SPECIAL NOT REPORTED 05/05/2018 06:49 PM     Lab Results   Component Value Date/Time    CULTURE  05/05/2018 06:49 PM     Performed at [facility], [address] [place] [address] [phone]    CULTURE (A) 05/05/2018 06:49 PM     POSITIVE BLOOD CULTURE, RN NOTIFIED: DEANDRA ELENA, 5/6/18, 11:03. CULTURE (A) 05/05/2018 06:49 PM     DIRECT GRAM STAIN FROM BOTTLE: GRAM POSITIVE COCCI IN PAIRS AND CHAINS    CULTURE PNAFISH negative. Culture results to follow. (A) 05/05/2018 06:49 PM    CULTURE (A) 05/05/2018 06:49 PM     VIRIDANS STREPTOCOCCUS GROUP TWO MORPHOLOGIES A single positive blood culture of    CULTURE (A) 05/05/2018 06:49 PM      coagulase negative staphylococci, micrococci, diphtheroids or Bacillus species    CULTURE (A) 05/05/2018 06:49 PM      should be interpreted with caution and viewed as likely a skin contaminant.        Lab Results   Component Value Date    POCPH 7.54 05/06/2018    PHART 7.41 03/01/2013    POCPCO2 44 05/06/2018    JGF1WIV 48 03/01/2013    POCPO2 67 05/06/2018    PO2ART 72 03/01/2013    POCHCO3 37.4 05/06/2018    GCK0COV 29.8 03/01/2013    NBEA NOT REPORTED 05/06/2018    PBEA 15 05/06/2018 ischemic or traumatic insult. Physical Examination:        BP (!) 131/56   Pulse 68   Temp 97.6 °F (36.4 °C) (Oral)   Resp 16   Ht 4' 11\" (1.499 m)   Wt 119 lb 6.4 oz (54.2 kg)   SpO2 100%   BMI 24.12 kg/m²   Temp (24hrs), Av.3 °F (36.8 °C), Min:97.6 °F (36.4 °C), Max:98.7 °F (37.1 °C)    Recent Labs      18   0712   POCGLU  76       Intake/Output Summary (Last 24 hours) at 18 0780  Last data filed at 18 0530   Gross per 24 hour   Intake              360 ml   Output              300 ml   Net               60 ml       General Appearance:  alert, well appearing, and in no acute distress  Mental status: oriented to person, place, and time with normal affect  Head:  normocephalic, atraumatic. Eye: no icterus, redness, pupils equal and reactive, extraocular eye movements intact, conjunctiva clear  Ear: normal external ear, no discharge, hearing intact  Nose:  no drainage noted  Mouth: mucous membranes moist  Neck: supple, no carotid bruits, thyroid not palpable  Lungs: Bilateral equal air entry, clear to ausculation, no wheezing, rales or rhonchi, normal effort  Cardiovascular: normal rate, regular rhythm, no murmur, gallop, rub.   Abdomen: Soft, nontender, nondistended, normal bowel sounds, no hepatomegaly or splenomegaly  Neurologic: There are no new focal motor or sensory deficits, normal muscle tone and bulk, no abnormal sensation, normal speech, cranial nerves II through XII grossly intact  Skin: No gross lesions, rashes, bruising or bleeding on exposed skin area  Extremities:  peripheral pulses palpable, no pedal edema or calf pain with palpation  Psych: normal affect      Assessment:        Principal Problem:    Unresponsive episode  Active Problems:    Depression    CAD (coronary artery disease)    Carotid stenosis    Essential hypertension    COPD exacerbation (HCC)    Hypoxia    Pancytopenia ([facility]    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

## 2018-08-27 NOTE — PROGRESS NOTES
Physical Therapy  DATE: 2018    NAME: Manuel Dahl  MRN: [id number]   : 1940    Patient not seen this date for Physical Therapy due to:  [] Blood transfusion in progress  [] Hemodialysis  []  Patient Declined  [] Spine Precautions   [] Strict Bedrest  [] Surgery/ Procedure  [x] Testing   Vascular testing this am. Check back this pm. 2018      [] Other        [] PT being discontinued at this time. Patient independent. No further needs. [] PT being discontinued at this time as the patient has been transferred to palliative care. No further needs. Mary Corley, AMRITA  Evaluation/treatment performed by Student PT under the supervision of co-signing PT who agrees with all evaluation/treatment and documentation.

## 2018-08-27 NOTE — PROGRESS NOTES
[facility]  Occupational Therapy Not Seen Note    DATE: 2018  Name: Shweta Murillo  : 1940  MRN: [id number]    Patient not available for Occupational Therapy due to:    [x] Testing: Pt at vascular testing this am.    [] Hemodialysis    [] Blood Transfusion in Progress    []Refusal by Patient:    [] Surgery/Procedure:    [] Strict Bedrest    [] Sedation    [] Spine Precautions     [] Pt being transferred to palliative care at this time. Spoke with pt/family and OT services to be defered. [] Pt independent with functional mobility and functional tasks. Pt with no OT acute care needs at this time, will defer OT eval.    [] Other    Next Scheduled Treatment: Chk back PM or 18.      Signature: ELIZA Roe

## 2018-08-28 VITALS
SYSTOLIC BLOOD PRESSURE: 115 MMHG | HEIGHT: 59 IN | WEIGHT: 119.4 LBS | DIASTOLIC BLOOD PRESSURE: 52 MMHG | BODY MASS INDEX: 24.07 KG/M2 | TEMPERATURE: 98.2 F | OXYGEN SATURATION: 100 % | RESPIRATION RATE: 18 BRPM | HEART RATE: 80 BPM

## 2018-08-28 LAB — PATHOLOGIST REVIEW: NORMAL

## 2018-08-28 PROCEDURE — 94762 N-INVAS EAR/PLS OXIMTRY CONT: CPT

## 2018-08-28 PROCEDURE — 6370000000 HC RX 637 (ALT 250 FOR IP): Performed by: FAMILY MEDICINE

## 2018-08-28 PROCEDURE — 99231 SBSQ HOSP IP/OBS SF/LOW 25: CPT | Performed by: INTERNAL MEDICINE

## 2018-08-28 PROCEDURE — 6370000000 HC RX 637 (ALT 250 FOR IP): Performed by: NURSE PRACTITIONER

## 2018-08-28 PROCEDURE — 6370000000 HC RX 637 (ALT 250 FOR IP): Performed by: PSYCHIATRY & NEUROLOGY

## 2018-08-28 PROCEDURE — 94640 AIRWAY INHALATION TREATMENT: CPT

## 2018-08-28 PROCEDURE — 97116 GAIT TRAINING THERAPY: CPT

## 2018-08-28 PROCEDURE — 97110 THERAPEUTIC EXERCISES: CPT

## 2018-08-28 PROCEDURE — 99239 HOSP IP/OBS DSCHRG MGMT >30: CPT | Performed by: INTERNAL MEDICINE

## 2018-08-28 PROCEDURE — 99232 SBSQ HOSP IP/OBS MODERATE 35: CPT | Performed by: INTERNAL MEDICINE

## 2018-08-28 PROCEDURE — 93880 EXTRACRANIAL BILAT STUDY: CPT

## 2018-08-28 RX ORDER — CLOPIDOGREL BISULFATE 75 MG/1
75 TABLET ORAL DAILY
Qty: 30 TABLET | Refills: 3 | DISCHARGE
Start: 2018-08-29

## 2018-08-28 RX ADMIN — GABAPENTIN 300 MG: 300 CAPSULE ORAL at 08:35

## 2018-08-28 RX ADMIN — PANTOPRAZOLE SODIUM 40 MG: 40 TABLET, DELAYED RELEASE ORAL at 08:35

## 2018-08-28 RX ADMIN — LEVOTHYROXINE SODIUM 25 MCG: 25 TABLET ORAL at 06:44

## 2018-08-28 RX ADMIN — CARBIDOPA AND LEVODOPA 1 TABLET: 25; 100 TABLET ORAL at 16:28

## 2018-08-28 RX ADMIN — CARVEDILOL 6.25 MG: 12.5 TABLET, FILM COATED ORAL at 08:36

## 2018-08-28 RX ADMIN — LOPERAMIDE HYDROCHLORIDE 2 MG: 2 CAPSULE ORAL at 14:56

## 2018-08-28 RX ADMIN — Medication 500 MCG: at 08:35

## 2018-08-28 RX ADMIN — CARVEDILOL 6.25 MG: 12.5 TABLET, FILM COATED ORAL at 16:28

## 2018-08-28 RX ADMIN — AMLODIPINE BESYLATE 5 MG: 10 TABLET ORAL at 08:35

## 2018-08-28 RX ADMIN — CLOPIDOGREL 75 MG: 75 TABLET, FILM COATED ORAL at 08:35

## 2018-08-28 RX ADMIN — CARBIDOPA AND LEVODOPA 1 TABLET: 25; 100 TABLET ORAL at 12:35

## 2018-08-28 RX ADMIN — DULOXETINE HYDROCHLORIDE 90 MG: 30 CAPSULE, DELAYED RELEASE ORAL at 08:35

## 2018-08-28 RX ADMIN — APIXABAN 5 MG: 5 TABLET, FILM COATED ORAL at 08:35

## 2018-08-28 RX ADMIN — MOMETASONE FUROATE AND FORMOTEROL FUMARATE DIHYDRATE 2 PUFF: 200; 5 AEROSOL RESPIRATORY (INHALATION) at 09:15

## 2018-08-28 RX ADMIN — ISOSORBIDE MONONITRATE 60 MG: 60 TABLET ORAL at 08:35

## 2018-08-28 RX ADMIN — ASPIRIN 81 MG 81 MG: 81 TABLET ORAL at 08:35

## 2018-08-28 RX ADMIN — CARBIDOPA AND LEVODOPA 1 TABLET: 25; 100 TABLET ORAL at 08:35

## 2018-08-28 ASSESSMENT — PAIN SCALES - GENERAL: PAINLEVEL_OUTOF10: 0

## 2018-08-28 NOTE — PROGRESS NOTES
normal speech, no focal findings or movement disorder noted  Extremities - peripheral pulses normal, no pedal edema, no clubbing or cyanosis  Skin - normal coloration and turgor, no rashes, no suspicious skin lesions noted         Data:    No intake/output data recorded. In: 1270 [P.O.:680; I.V.:590]  Out: 800 [Urine:800]    CBC:   Recent Labs      08/27/18   0520   WBC  3.3*   HGB  10.5*   PLT  160     BMP:    Recent Labs      08/27/18   0520   NA  143   K  4.3   CL  102   CO2  32*   BUN  12   CREATININE  0.70   GLUCOSE  88     Hepatic:   No results for input(s): AST, ALT, ALB, BILITOT, ALKPHOS in the last 72 hours. INR:   No results for input(s): INR in the last 72 hours. PTT:No results for input(s): PTT in the last 72 hours.     Results for orders placed or performed during the hospital encounter of 08/24/18   CBC auto differential   Result Value Ref Range    WBC 2.8 (L) 3.5 - 11.3 k/uL    RBC 3.15 (L) 3.95 - 5.11 m/uL    Hemoglobin 10.3 (L) 11.9 - 15.1 g/dL    Hematocrit 32.7 (L) 36.3 - 47.1 %    .8 (H) 82.6 - 102.9 fL    MCH 32.7 25.2 - 33.5 pg    MCHC 31.5 28.4 - 34.8 g/dL    RDW 17.1 (H) 11.8 - 14.4 %    Platelets 962 (L) 637 - 453 k/uL    MPV 10.7 8.1 - 13.5 fL    NRBC Automated 0.0 0.0 per 100 WBC    Differential Type NOT REPORTED     Seg Neutrophils 51 36 - 65 %    Lymphocytes 31 24 - 43 %    Monocytes 13 (H) 3 - 12 %    Eosinophils % 4 1 - 4 %    Basophils 1 0 - 2 %    Immature Granulocytes 0 0 %    Segs Absolute 1.43 (L) 1.50 - 8.10 k/uL    Absolute Lymph # 0.87 (L) 1.10 - 3.70 k/uL    Absolute Mono # 0.38 0.10 - 1.20 k/uL    Absolute Eos # 0.12 0.00 - 0.44 k/uL    Basophils # 0.03 0.00 - 0.20 k/uL    Absolute Immature Granulocyte <0.03 0.00 - 0.30 k/uL    WBC Morphology NOT REPORTED     RBC Morphology ANISOCYTOSIS PRESENT     Platelet Estimate NOT REPORTED    Comprehensive Metabolic Panel w/ Reflex to MG   Result Value Ref Range    Glucose 88 70 - 99 mg/dL    BUN 26 (H) 8 - 23 mg/dL MDMA, Urine NOT REPORTED NEG    Buprenorphine Urine NOT REPORTED NEG    Test Information       Assay provides medical screening only.   The absence of expected drug(s) and/or   Hemoglobin and hematocrit, blood   Result Value Ref Range    POC Hemoglobin 10.2 (L) 12.0 - 16.0 g/dL    POC Hematocrit 30 (L) 36 - 46 %   SODIUM (POC)   Result Value Ref Range    POC Sodium 142 138 - 146 mmol/L   POTASSIUM (POC)   Result Value Ref Range    POC Potassium 4.3 3.5 - 4.5 mmol/L   CHLORIDE (POC)   Result Value Ref Range    POC Chloride 103 98 - 107 mmol/L   CALCIUM, IONIC (POC)   Result Value Ref Range    POC Ionized Calcium 1.05 (L) 1.15 - 1.33 mmol/L   Lactic Acid, Whole Blood   Result Value Ref Range    Lactic Acid, Whole Blood 1.1 0.7 - 2.1 mmol/L   Valproic acid level, total   Result Value Ref Range    Valproic Acid Lvl 20 (L) 50 - 125 ug/mL    Valproic Dose amount NOT REPORTED     Valproic Date last dose NOT REPORTED     Valproic Time last dose NOT REPORTED    AMMONIA   Result Value Ref Range    Ammonia <10 (L) 11 - 51 umol/L   Hemoglobin and hematocrit, blood   Result Value Ref Range    POC Hemoglobin 10.0 (L) 12.0 - 16.0 g/dL    POC Hematocrit 29 (L) 36 - 46 %   SODIUM (POC)   Result Value Ref Range    POC Sodium 142 138 - 146 mmol/L   POTASSIUM (POC)   Result Value Ref Range    POC Potassium 4.4 3.5 - 4.5 mmol/L   CHLORIDE (POC)   Result Value Ref Range    POC Chloride 100 98 - 107 mmol/L   CALCIUM, IONIC (POC)   Result Value Ref Range    POC Ionized Calcium 1.14 (L) 1.15 - 1.33 mmol/L   Basic Metabolic Panel w/ Reflex to MG   Result Value Ref Range    Glucose 88 70 - 99 mg/dL    BUN 17 8 - 23 mg/dL    CREATININE 0.66 0.50 - 0.90 mg/dL    Bun/Cre Ratio NOT REPORTED 9 - 20    Calcium 8.1 (L) 8.6 - 10.4 mg/dL    Sodium 141 135 - 144 mmol/L    Potassium 4.3 3.7 - 5.3 mmol/L    Chloride 101 98 - 107 mmol/L    CO2 31 20 - 31 mmol/L    Anion Gap 9 9 - 17 mmol/L    GFR Non-African American >60 >60 mL/min    GFR African Ratio NOT REPORTED 9 - 20    Calcium 8.5 (L) 8.6 - 10.4 mg/dL    Sodium 143 135 - 144 mmol/L    Potassium 4.3 3.7 - 5.3 mmol/L    Chloride 102 98 - 107 mmol/L    CO2 32 (H) 20 - 31 mmol/L    Anion Gap 9 9 - 17 mmol/L    GFR Non-African American >60 >60 mL/min    GFR African American >60 >60 mL/min    GFR Comment          GFR Staging NOT REPORTED    CBC WITH AUTO DIFFERENTIAL   Result Value Ref Range    WBC 3.3 (L) 3.5 - 11.3 k/uL    RBC 3.20 (L) 3.95 - 5.11 m/uL    Hemoglobin 10.5 (L) 11.9 - 15.1 g/dL    Hematocrit 31.6 (L) 36.3 - 47.1 %    MCV 98.8 82.6 - 102.9 fL    MCH 32.8 25.2 - 33.5 pg    MCHC 33.2 28.4 - 34.8 g/dL    RDW 16.0 (H) 11.8 - 14.4 %    Platelets 984 880 - 992 k/uL    MPV 10.8 8.1 - 13.5 fL    NRBC Automated 0.0 0.0 per 100 WBC    Differential Type NOT REPORTED     Seg Neutrophils 54 36 - 65 %    Lymphocytes 30 24 - 43 %    Monocytes 11 3 - 12 %    Eosinophils % 4 1 - 4 %    Basophils 1 0 - 2 %    Immature Granulocytes 0 0 %    Segs Absolute 1.77 1.50 - 8.10 k/uL    Absolute Lymph # 0.98 (L) 1.10 - 3.70 k/uL    Absolute Mono # 0.37 0.10 - 1.20 k/uL    Absolute Eos # 0.14 0.00 - 0.44 k/uL    Basophils # <0.03 0.00 - 0.20 k/uL    Absolute Immature Granulocyte <0.03 0.00 - 0.30 k/uL    WBC Morphology NOT REPORTED     RBC Morphology ANISOCYTOSIS PRESENT     Platelet Estimate NOT REPORTED    Surgical Pathology   Result Value Ref Range    Surgical Pathology Report       (NOTE)  [id number]  [address],  [address]. [facility], [address]  [phone]  Fax: [phone]  SURGICAL PATHOLOGY CONSULTATION    Patient Name: Judith Altamirano  MR#: [id number]  Specimen #[id number]    Procedures/Addenda  PERIPHERAL BLOOD REPORT     Date Ordered:     8/27/2018     Status:  Signed Out      Date Complete:     8/27/2018     By: Dena Schaeffer M.D. Date Reported:     8/27/2018       INTERPRETATION  PERIPHERAL BLOOD:  MILD PANCYTOPENIA. NO SPECIFIC MORPHOLOGIC  ABNORMALITIES. CLINICAL CONDITIONS ASSOCIATED WITH PANCYTOPENIA  INCLUDE B 12/FOLATE DEFICIENCY, DRUG/TOXIN EFFECT INCLUDING ETOH, SLE  AND OTHER CONNECTIVE TISSUE DISEASE, HYPERSPLENISM, HYPOPLASTIC BONE  MARROW, HEMATOLOGIC AND NONHEMATOLOGIC BONE MARROW NEOPLASM, ETC.   CLINICAL HISTORY POSITIVE FOR PRIOR CHEMOTHERAPY, POSSIBLE MDS              RESULTS-COMMENTS                            PERIPHERAL BLOOD STUDY    WBC 2.8  L  3.5 - 11.3 k/uL Final   RBC 3.26 L  3.95 - 5.11 m/uL Final   Hemoglobin 10.4 L  11.9 - 15.1 g/dL Final   Hematocrit 32.9 L  36.3 -  47.1 % Final   .9  82.6 - 102.9 fL Final   MCH 31.9  25.2 -  33.5 pg Final   MCHC 31.6  28.4 - 34.8 g/dL Final   RDW 16.5 H  11.8 -  14.4 % Final   Platelets 020 L  971 - 453 k/uL Final   MPV 10.6  8.1 -  13.5 fL Final     Seg Neutrophils 51  36 - 65 % Final   Lymphocytes 34  24 - 43 % Final   Monocytes 10  3 - 12 % Final   Eosinophils % 4  1 - 4 % Final    Basophils 1  0 - 2 % Final   Immature Granulocytes 0  0 % Final   Segs  Absolute 1.45 L  1.50 - 8.10 k/uL Final   Absolute Lymph # 0.94 L   1.10 - 3.70 k/uL Final   Absolute Mono # 0.27  0.10 - 1.20 k/uL Final   Absolute Eos # 0.10  0.00 - 0.44 k/uL Final   Basophils # <0.03  0.00  - 0.20 k/uL Final   Absolute Immature Granulocyte <0.03  0.00 - 0.30  k/uL Final     Retic % 4.3 H  0.5 - 1.9 % Final   Absolute Retic # 0.140 H  0.030 -  0.080 M/uL Final   Immature Retic Fract 17.800  2.7 - 18.3 %  Final    Retic Hemoglobin 35.6  28.2 - 35.7 pg Final                          PERIPHERAL EXAMINATION BY PATHOLOGIST    PLATELETS: normal           LEUKOCYTES: normal      ERYTHROCYTES:  anisocytosis,  otherwise normal         Fede Gonzalez M.D.                  Source:  1: PERIPHERAL BLOOD SMEAR TO PATHOLOGIST     Venous Blood Gas, POC   Result Value Ref Range    pH, Trevor 7.324 7.320 - 7.430    pCO2, Trevor 62.5 (H) 41.0 - 51.0 mm Hg    pO2, Trevor 41.5 30.0 - 50.0 mm Hg    HCO3, Venous 32.5 (H) 22.0 - 29.0 mmol/L    Total CO2, Venous 34 (H) 23.0 - 30.0 mmol/L    Negative Base Excess, Trevor NOT REPORTED 0.0 - 2.0    Positive Base Excess, Trevor 5 (H) 0.0 - 3.0    O2 Sat, Trevor 71 60.0 - 85.0 %    O2 Device/Flow/% NOT REPORTED     Yaw Test NOT REPORTED     Sample Site NOT REPORTED     Mode NOT REPORTED     FIO2 NOT REPORTED     Pt Temp NOT REPORTED     POC pH Temp NOT REPORTED     POC pCO2 Temp NOT REPORTED mm Hg    POC pO2 Temp NOT REPORTED mm Hg   Creatinine W/GFR Point of Care   Result Value Ref Range    POC Creatinine 1.15 0.51 - 1.19 mg/dL    GFR Comment 55 (L) >60 mL/min    GFR Non- 46 (L) >60 mL/min    GFR Comment         Lactic Acid, POC   Result Value Ref Range    POC Lactic Acid 0.92 0.56 - 1.39 mmol/L   POCT Glucose   Result Value Ref Range    POC Glucose 84 74 - 100 mg/dL   Anion Gap (Calc) POC   Result Value Ref Range    Anion Gap 7 7 - 16 mmol/L   POCT troponin   Result Value Ref Range    POC Troponin I 0.00 0.00 - 0.10 ng/mL    POC Troponin Interp       The Troponin-I (POC) results cannot be compared to the Troponin-T results. Venous Blood Gas, POC   Result Value Ref Range    pH, Trevor 7.260 (L) 7.320 - 7.430    pCO2, Trevor 83.3 (HH) 41.0 - 51.0 mm Hg    pO2, Trevor 27.6 (L) 30.0 - 50.0 mm Hg    HCO3, Venous 37.3 (H) 22.0 - 29.0 mmol/L    Total CO2, Venous 40 (H) 23.0 - 30.0 mmol/L    Negative Base Excess, Trevor NOT REPORTED 0.0 - 2.0    Positive Base Excess, Trevor 8 (H) 0.0 - 3.0    O2 Sat, Trevor 40 (L) 60.0 - 85.0 %    O2 Device/Flow/% NOT REPORTED     Yaw Test NOT REPORTED     Sample Site NOT REPORTED     Mode NOT REPORTED     FIO2 NOT REPORTED     Pt Temp NOT REPORTED     POC pH Temp NOT REPORTED     POC pCO2 Temp NOT REPORTED mm Hg    POC pO2 Temp NOT REPORTED mm Hg   Creatinine W/GFR Point of Care   Result Value Ref Range    POC Creatinine Result not available.  0.51 - 1.19 mg/dL    GFR Comment CANNOT BE CALCULATED >60 mL/min    GFR Non- CANNOT BE CALCULATED >60 mL/min    GFR Comment         Lactic Acid, POC   Result Value Ref Range    POC Lactic Acid 0.64 0.56 - 1.39 mmol/L   POCT Glucose   Result Value Ref Range    POC Glucose 76 74 - 100 mg/dL   Anion Gap (Calc) POC   Result Value Ref Range    Anion Gap 5 (L) 7 - 16 mmol/L   EKG 12 lead   Result Value Ref Range    Ventricular Rate 80 BPM    Atrial Rate 80 BPM    P-R Interval 138 ms    QRS Duration 78 ms    Q-T Interval 360 ms    QTc Calculation (Bazett) 415 ms    P Axis 74 degrees    R Axis 75 degrees    T Axis 80 degrees       Ct Head Wo Contrast    Result Date: 8/24/2018  EXAMINATION: CT OF THE HEAD WITHOUT CONTRAST  8/24/2018 6:08 am TECHNIQUE: CT of the head was performed without the administration of intravenous contrast. Dose modulation, iterative reconstruction, and/or weight based adjustment of the mA/kV was utilized to reduce the radiation dose to as low as reasonably achievable. COMPARISON: May 5, 2018 HISTORY: ORDERING SYSTEM PROVIDED HISTORY: MENTAL STATUS CHANGE (AFTER TRAUMA) TECHNOLOGIST PROVIDED HISTORY: FINDINGS: BRAIN/VENTRICLES: There is no acute intracranial hemorrhage, mass effect or midline shift. The gray-white differentiation is maintained without evidence of an acute infarct. There is no evidence of hydrocephalus. Right greater than left basal ganglia and cerebellar chronic lacunar infarcts present. Patchy cerebral white matter hypoattenuation. Prominent extra-axial spaces along the biparietal convexity again noted. ORBITS: The visualized portion of the orbits demonstrate no acute abnormality. SINUSES: The visualized paranasal sinuses and mastoid air cells demonstrate no acute abnormality. SOFT TISSUES/SKULL:  No acute abnormality of the visualized skull or soft tissues. No acute intracranial abnormality. Stable chronic microangiopathic ischemic changes and scattered chronic lacunar infarcts. Findings were conveyed to Dr. Almaz Rodriguez At 6:18 am on 8/24/2018.      Ct Abdomen Pelvis W Iv Contrast 8/24/2018  EXAMINATION: SINGLE XRAY VIEW OF THE CHEST 8/24/2018 6:09 am COMPARISON: 05/05/2018 HISTORY: ORDERING SYSTEM PROVIDED HISTORY: stroke alert TECHNOLOGIST PROVIDED HISTORY: stroke alert FINDINGS: Right internal jugular infusion port terminating right atrium unchanged. Normal cardiomediastinal silhouette. Elevation of right hemidiaphragm with minor scarring at the lung base unchanged. No focal consolidation. Some calcified nodules right lower lung unchanged. No pleural effusion or pneumothorax. Right IJ infusion port. Elevated right hemidiaphragm. No acute process. Cta Neck W Contrast    Result Date: 8/25/2018  EXAMINATION: CTA OF THE HEAD WITH CONTRAST; CTA OF THE NECK 8/24/2018 6:36 am: TECHNIQUE: CTA of the head/brain was performed with the administration of intravenous contrast. Multiplanar reformatted images are provided for review. MIP images are provided for review. Dose modulation, iterative reconstruction, and/or weight based adjustment of the mA/kV was utilized to reduce the radiation dose to as low as reasonably achievable.; CTA of the neck was performed with the administration of intravenous contrast. Multiplanar reformatted images are provided for review. MIP images are provided for review. Stenosis of the internal carotid arteries measured using NASCET criteria. Dose modulation, iterative reconstruction, and/or weight based adjustment of the mA/kV was utilized to reduce the radiation dose to as low as reasonably achievable. COMPARISON: None HISTORY: ORDERING SYSTEM PROVIDED HISTORY: stroke TECHNOLOGIST PROVIDED HISTORY: FINDINGS: CTA NECK: AORTIC ARCH/ARCH VESSELS: There is a normal branch pattern of the aortic arch. No significant stenosis is seen of the innominate artery or subclavian arteries. CAROTID ARTERIES: Right internal carotid artery mixed density plaques narrow the lumen to 1.4 mm, distally averaging 5 mm.   Left internal carotid artery calcified plaques do not result in significant stenosis. The common carotid arteries demonstrate peripheral atheromatous plaques but remain patent. VERTEBRAL ARTERIES: There is multifocal luminal irregularities of the right vertebral artery contributing to multifocal mild stenosis without occlusion. Left vertebral artery is patent with minimal atheromatous plaques of the V4 segment and origin. SOFT TISSUES: Emphysematous changes of the lung apices. Diminutive right thyroid lobe BONES: The visualized osseous structures appear unremarkable. CTA HEAD: ANTERIOR CIRCULATION: Carotid siphon calcifications without significant stenosis. The anterior cerebral and middle cerebral arteries appear patent. POSTERIOR CIRCULATION: The posterior cerebral arteries demonstrate no focal stenosis. The vertebral and basilar arteries appear unremarkable. BRAIN: No mass effect or midline shift. No abnormal extra-axial fluid collection. The gray-white differentiation appears grossly maintained. Nondominant right vertebral artery multifocal mild stenosis due to atheromatous plaques. Right internal carotid artery origin 70% stenosis by NASCET criteria. Patent left ICA. Intracranial arteries without acute findings. Findings were conveyed to Dr. Jacob Garay At 7:00 am on 8/24/2018. Us Abdomen Limited    Result Date: 8/25/2018  EXAMINATION: RIGHT UPPER QUADRANT ULTRASOUND 8/25/2018 4:07 pm COMPARISON: CT abdomen and pelvis performed earlier today. HISTORY: ORDERING SYSTEM PROVIDED HISTORY: To evaluate for hepatosplenomegaly. TECHNOLOGIST PROVIDED HISTORY: Patient has splenomegaly and hypodense lesion on previous CT scan To evaluate for hepatosplenomegaly. FINDINGS: LIVER:  The liver demonstrates normal echogenicity without evidence of intrahepatic biliary ductal dilatation. No focal mass. Hepatopetal flow seen within the portal vein. BILIARY SYSTEM:  Gallbladder has been surgically removed. Common bile duct is within normal limits measuring 6.2 mm.  SPLEEN: The spleen irregularities of the right vertebral artery contributing to multifocal mild stenosis without occlusion. Left vertebral artery is patent with minimal atheromatous plaques of the V4 segment and origin. SOFT TISSUES: Emphysematous changes of the lung apices. Diminutive right thyroid lobe BONES: The visualized osseous structures appear unremarkable. CTA HEAD: ANTERIOR CIRCULATION: Carotid siphon calcifications without significant stenosis. The anterior cerebral and middle cerebral arteries appear patent. POSTERIOR CIRCULATION: The posterior cerebral arteries demonstrate no focal stenosis. The vertebral and basilar arteries appear unremarkable. BRAIN: No mass effect or midline shift. No abnormal extra-axial fluid collection. The gray-white differentiation appears grossly maintained. Nondominant right vertebral artery multifocal mild stenosis due to atheromatous plaques. Right internal carotid artery origin 70% stenosis by NASCET criteria. Patent left ICA. Intracranial arteries without acute findings. Findings were conveyed to Dr. Elias Tucker At 7:00 am on 8/24/2018. Mri Brain W Wo Contrast    Result Date: 8/24/2018  EXAMINATION: MRI OF THE BRAIN WITHOUT AND WITH CONTRAST  8/24/2018 2:25 pm TECHNIQUE: Multiplanar multisequence MRI of the head/brain was performed without and with the administration of intravenous contrast. COMPARISON: 05/07/2018. HISTORY: ORDERING SYSTEM PROVIDED HISTORY: seizure Initial evaluation. FINDINGS: Motion degrades images limiting evaluation. INTRACRANIAL STRUCTURES/VENTRICLES:  There is no acute infarct. Areas of T2 FLAIR hyperintensity are seen in the periventricular and subcortical white matter, which are nonspecific, but may represent chronic microvascular ischemic change. T2 hyperintensity is again seen within the cerebellar hemispheres bilaterally. No mass effect or midline shift. No evidence of an acute intracranial hemorrhage.   The ventricles and sulci are normal in size and configuration. The sellar/suprasellar regions appear unremarkable. The normal signal voids within the major intracranial vessels appear maintained. No abnormal focus of enhancement is seen within the brain. ORBITS: The visualized portion of the orbits demonstrate no acute abnormality. SINUSES: The visualized paranasal sinuses and mastoid air cells are well aerated. BONES/SOFT TISSUES: The bone marrow signal intensity appears normal. The soft tissues demonstrate no acute abnormality. 1. No acute intracranial abnormality. No acute infarct. 2. Mild-to-moderate chronic microvascular ischemic change, which appear similar to the exam. 3. T2 hyperintensity again seen within the cerebellar hemispheres bilaterally, which could represent sequelae of a prior ischemic or traumatic insult. 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[facility]     Angina effort ([facility])     Anxiety     Atrial fibrillation ([facility])     CAD (coronary artery disease)     s/p stents    Cancer ([facility])     breast, right    CHF (congestive heart failure) ([facility])     unspecified diastolic    Convulsions ([facility])     COPD (chronic obstructive pulmonary disease) ([facility])     Degenerative disc disease     Cervical    Depression     Depression     Diabetes mellitus ([address]     Esophageal reflux     Hypertension     unspecified    Inflammatory spondylopathy ([facility]     Iron deficiency     Lymphoma ([facility] [address] [facility] 1993    MDRO (multiple drug resistant organisms) resistance 8/10/2014    E. Coli urine    MRSA (methicillin resistant staph aureus) culture positive 07/13/2016    nares    Neuropathy     Osteoarthritis     Parkinson's disease ([address]     Peripheral vascular disease ([facility])     Restless leg syndrome     Sleep apnea     Transient cerebral ischemia     Unsteady gait       Allergies:    Allergies   Allergen Reactions    Naprosyn [Naproxen] Hives    Actonel [Risedronate Sodium]     Albuterol Sulfate     Atenolol     Codeine Other (See Comments)     unknown reaction    Cortisone     Requip [Ropinirole Hcl]         Assessment      Primary Problem:  Unresponsive episode   Current Problems:  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 [Z86.73]     Unresponsive episode [R41.89] 08/24/2018    Hypoxia [R09.02]     Pancytopenia ([facility] [facility] [D61.818]     COPD exacerbation ([facility] [address] [facility] [J44.1] 02/26/2018    Essential hypertension [I10]     Carotid stenosis [I65.29] 12/21/2014    CAD (coronary artery disease) [I25.10] 03/22/2014    Depression [F32.9] 12/15/2013     History of breast cancer 1993  History of lung cancer status post resection  MALT of left lung  PET avid lung nodule per CT PET 5/2018  Pancytopenia with macrocytic anemia, neutropenia  Splenomegaly and hyperdense splenic lesion    Plan   Personally reviewed results of lab workup and other relevant current data. Abdominal pain is chronic and waxes and wanes. Patient will need outpatient CT PET to follow-up on the lung nodule  Continue to monitor cell counts. No indication for blood product transfusion or GSCsf at this point. Continue to monitor cell counts. 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 Reversible causes ruled out. This note is created with the assistance of a speech recognition program.  While intending to generate a document that actually reflects the content of the visit, the document can still have some errors including those of syntax and sound a like substitutions which may escape proof reading. It such instances, actual meaning can be extrapolated by contextual diversion.

## 2018-08-28 NOTE — PROGRESS NOTES
Palliative Care Progress Note    NAME:  Trung Tavera  MEDICAL RECORD NUMBER:  [id number]  AGE: 70 y.o. GENDER: female  : 1940  TODAY'S DATE:  2018    Reason for Consult:     Provision of information regarding PC and/or hospice philosophies  Complex, time-intensive communication and interdisciplinary psychosocial support  Clarification of goals of care and/or assistance with difficult decision-making  Guidance in regards to resources and transition(s)  History of Present Illness     The patient is a 70 y.o. Non-/non  female who presents with Respiratory Distress      Referred to Palliative Care by  [x] Physician   [] Nursing  [] Family Request   [] Other:       She was admitted to the medicine service for Unresponsive episode [R41.89]. Her hospital course has been associated with Unresponsive episode. The patient has a complicated medical history and has been hospitalized since 2018  5:41 AM. Patient was found unconscious at [facility]. Patient does not remember the event. Stroke alert called on patient. MRI did not reveal any acute stroke, mild-moderate chronic microvascular ischemic changes, T2 hyperintesities within cerebellar hemispheres B/L consistent with prior ischemic event. Neuroogy is attributing stroke-like event to hypoxia and hypercarbia. Patient does have ischemic risk factors of Afib, HTN, Cancer, CAD, and 70 % R ICA stenosis. 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 Patient currently on ELiquis, ASA/Plavix, coreg, and Cymbalta. Hem/Onc following for pancytopenia. Breast Ca  s/p chemo. NSCLC s/p resection. MALT Lymphoma Lung subsequesnt. PET scan May 2018: Avid Lung nodules. 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 Outpatient oncologist Dr. Junior Carlson. CT abd: stable spleen cystic lesion. WBC: 3.3, Hb: 10.5, PLT: 121. PH 7.32. HCO3 68.8. Ferritin 196. Patient with long heavy smoking Hx. Currently smoking 1-2 cigarettes per day. Dr. Saul Haley nuerologist outpatient.  [facility] pulmonology group outpatient.     Palliative care consulted for goals of care and code status discussion. Patient would like to make her nephew Tee Odor her POA. She currently does not have a living will. Patient voiced that under certain circumstances she would like to be full code and other circumstances she would like to be [facility]. She was glad that she was brought to the hospital for this current episode, but does not want to come to the hospital frequently. Niece was in the room during our discussion. OVERNIGHT EVENTS:    Patient evaluated by vascular surgery for ROSELYN/SMA stenosis. Patient not deemed a surgical candidate. Patient feels better overall. Still having abdominal pain with eating. Patient still having diarrhea right after meals. Imodium seems to help with the diarrhea. Patient eager to meet with palliative care clinic after hem/onc appointment to discuss further goals of care.      BP (!) 115/52   Pulse 80   Temp 98.2 °F (36.8 °C) (Oral)   Resp 18   Ht 4' 11\" (1.499 m)   Wt 119 lb 6.4 oz (54.2 kg)   SpO2 100%   BMI 24.12 kg/m²     Assessment        REVIEW OF SYSTEMS    []   UNABLE TO OBTAIN:     Constitutional:  []   Chills   [x]  Fatigue   []  Fevers   []  Malaise   [x]  Weight loss   [] Other:     Respiratory:   []  Cough    [x]  Shortness of breath    []  Chest pain    [] Other:     Cardiovascular:   []  Chest pain  []  Dyspnea    []  Exertional chest pressure/discomfort     [x] Fatigue      []  Palpitations    []  Syncope   [] Other:     Gastrointestinal:   [x]  Abdominal pain   []  Constipation    [x]  Diarrhea    []   Dysphagia   []  Reflux             []  Vomiting   [] Other:     Genitourinary:  []  Dysuria     []  Frequency   []  Hematuria   [] Nocturia   []  Urinary incontinence   [] Other:     Musculoskeletal:   [] Back pain    [x]  Muscle weakness   []  Myalgias    []  Neck pain   []  Stiff joints   []  Other:     Behavioral/Psych:   [] Anxiety    []  Depression []  Mood swings   [] Other:     PHYSICAL ASSESSMENT:     General: [x]  Oriented x3      [] well appearing      [] Intubated      [] ill appearing      [] Other:    Mental Status: [x] normal mental status exam      [] drowsy      [] Confused      [] Other:     Cardiovascular: [x]  Regular rate/rhythm      [] Arrhythmia      [] Other:     Chest: [x] Effort normal      [] lungs clear      [] respiratory distress      [] Tachypnea      []  Other:    Abdomen: [x] Soft/non-tender      []  Normal appearance      [] Distended      [] Ascites      [] Other:    Neurological: [x] Normal Speech      [] Normal Sensation      []  Deficits present:      Extremity:  [x] normal skin color/temp      [] clubbing/cyanosis      []  No edema      [] Other:     Palliative Performance Scale:  ___60%  Ambulation reduced; Significant disease; Can't do hobbies/housework; intake normal or reduced; occasional assist; LOC full/confusion  ___50%  Mainly sit/lie; Extensive disease; Can't do any work; Considerable assist; intake normal or reduced; LOC full/confusion  ___40%  Mainly in bed; Extensive disease; Mainly assist; intake normal or reduced; LOC full/confusion   ___30%  Bed Bound; Extensive disease; Total care; intake reduced; LOCfull/confusion  ___20%  Bed Bound; Extensive disease; Total care; intake minimal; Drowsy/coma  ___10%  Bed Bound; Extensive disease; Total care; Mouth care only; Drowsy/coma  ___0       Death      Plan      Palliative Interaction:    Education/support to family  Education/support to patient  Discharge planning/helping to coordinate care  code status    Principle Problem/Diagnosis:  Unresponsive episode    Additional Assessments:    1- Symptom management/ pain control     Pain Assessment:  Abdominal pain with eating               Anxiety:  racing thoughts                          Dyspnea:  none                          Fatigue:  exercise intolerance                          Other:     We feel the patient symptoms are

## 2018-08-28 NOTE — PROGRESS NOTES
[facility]    Progress Note    8/28/2018    8:39 AM    Name:   Manuel Dahl  MRN:     [id number]     Acct:      [de-identified]   Room:   [facility]/[address] [place] [address] Day:  4  Admit Date:  8/24/2018  5:41 AM    PCP:   No primary care provider on file. Code Status:  Prior    Subjective:     C/C:   Chief Complaint   Patient presents with    Respiratory Distress       Interval History Status: Improving    Patient feeling well today. To be seen by vascular surgery prior to discharge. I reviewed new lab results, imaging, and vitals summary from the past 24 hours. Also, I spoke with the RN caring for patient. Also, I reviewed all nursing/consult/specialty notes and addressed any concerns that may have been brought to light by Consultants, RNs, , or Social Workers. Brief History:     The patient is a 70 y.o.  Non-/non  female who presents with Respiratory Distress   and she is admitted to the hospital for the management of  Episode of unresponsiveness. Was brought to ER by EMS from the nursing home after being found unresponsive, was out to smoke a cigarette when she was found, she was moving all 4 extremeties, last seen well was 0330. In EMS she was severly hypertensive as well in ER. Stroke alert called, CTH, CTA H&N and were negative for acute pathology. Urine tox, ethanol were unremarkable  Patient has Parkinson and C/O her symptoms are not controlled recently, Valproate level was low. 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 May be 2/2 falls? No h/o brain bleeds known  Patient was hypoxic initially and disoriented then improved.   She was admitted for further management    Review of Systems:     Negative except for those highlighted:    CONSTITUTIONAL:  fevers, chills, sweats, medical history of Airway obstruction; Alcoholic polyneuropathy ([facility] Angina effort ([facility] Anxiety; Atrial fibrillation ([facility] CAD (coronary artery disease); Cancer [facility]); CHF (congestive heart failure) ([facility] Convulsions ([facility] COPD (chronic obstructive pulmonary disease) ([facility] Degenerative disc disease; Depression; Depression; Diabetes mellitus ([facility] Esophageal reflux; Hypertension; Inflammatory spondylopathy ([facility] Iron deficiency; Lymphoma ([facility] MDRO (multiple drug resistant organisms) resistance; MRSA (methicillin resistant staph aureus) culture positive; Neuropathy; Osteoarthritis; Parkinson's disease ([facility] Peripheral vascular disease ([facility] Restless leg syndrome; Sleep apnea; Transient cerebral ischemia; and Unsteady gait. Social History:   reports that she has been smoking Cigarettes. She has been smoking about 0.50 packs per day. She has never used smokeless tobacco. She reports that she does not drink alcohol or use drugs. Family History:   Family History   Problem Relation Age of Onset    Kidney Disease Mother     Cancer Mother     Heart Disease Father     Coronary Art Dis Sister        Vitals:  BP (!) 130/55   Pulse 72   Temp 98.6 °F (37 °C) (Oral)   Resp 20   Ht 4' 11\" (1.499 m)   Wt 119 lb 6.4 oz (54.2 kg)   SpO2 98%   BMI 24.12 kg/m²   Temp (24hrs), Av.2 °F (36.8 °C), Min:97.9 °F (36.6 °C), Max:98.6 °F (37 °C)    No results for input(s): POCGLU in the last 72 hours. I/O (24Hr):     Intake/Output Summary (Last 24 hours) at 18 0839  Last data filed at 18 0734   Gross per 24 hour   Intake             1818 ml   Output             1350 ml   Net              468 ml       Labs:    Hematology:  Recent Labs      18   0520   WBC  3.3*   RBC  3.20*   HGB  10.5*   HCT  31.6*   MCV  98.8   MCH  32.8   MCHC  33.2   RDW  16.0*   PLT  160   MPV  10.8     Chemistry:  Recent Labs      18   0520   NA  143   K  4.3   CL  102   CO2  32*   GLUCOSE  88   BUN  12 CREATININE  0.70   ANIONGAP  9   LABGLOM  >60   GFRAA  >60   CALCIUM  8.5*     No results for input(s): PROT, LABALBU, LABA1C, F3FLOAE, W1QERLZ, FT4, TSH, AST, ALT, LDH, GGT, ALKPHOS, LABGGT, BILITOT, BILIDIR, AMMONIA, AMYLASE, LIPASE, LACTATE, CHOL, HDL, LDLCHOLESTEROL, CHOLHDLRATIO, TRIG, VLDL, MGQ84PE, PHENYTOIN, PHENYF, URICACID, POCGLU in the last 72 hours. Lab Results   Component Value Date/Time    SPECIAL NOT REPORTED 05/05/2018 06:49 PM     Lab Results   Component Value Date/Time    CULTURE  05/05/2018 06:49 PM     Performed at [facility], [address] [place] [address] [phone]    CULTURE (A) 05/05/2018 06:49 PM     POSITIVE BLOOD CULTURE, RN NOTIFIED: DEANDRA ELENA, 5/6/18, 11:03. CULTURE (A) 05/05/2018 06:49 PM     DIRECT GRAM STAIN FROM BOTTLE: GRAM POSITIVE COCCI IN PAIRS AND CHAINS    CULTURE PNAFISH negative. Culture results to follow. (A) 05/05/2018 06:49 PM    CULTURE (A) 05/05/2018 06:49 PM     VIRIDANS STREPTOCOCCUS GROUP TWO MORPHOLOGIES A single positive blood culture of    CULTURE (A) 05/05/2018 06:49 PM      coagulase negative staphylococci, micrococci, diphtheroids or Bacillus species    CULTURE (A) 05/05/2018 06:49 PM      should be interpreted with caution and viewed as likely a skin contaminant. Lab Results   Component Value Date    POCPH 7.54 05/06/2018    PHART 7.41 03/01/2013    POCPCO2 44 05/06/2018    ZJC8IPA 48 03/01/2013    POCPO2 67 05/06/2018    PO2ART 72 03/01/2013    POCHCO3 37.4 05/06/2018    JNA7ECZ 29.8 03/01/2013    NBEA NOT REPORTED 05/06/2018    PBEA 15 05/06/2018    FUI3HTL 39 05/06/2018    QSZD5ZDP 95 05/06/2018    O6CPZVDV 93.7 03/01/2013    FIO2 INFORMATION NOT PROVIDED 08/25/2018       Radiology:    Ct Head Wo Contrast  Result Date: 8/24/2018  No acute intracranial abnormality. Stable chronic microangiopathic ischemic changes and scattered chronic lacunar infarcts. Findings were conveyed to Dr. Delaney Nielson At 6:18 am on 8/24/2018.      Ct Abdomen hours) at 08/28/18 0839  Last data filed at 08/28/18 0734   Gross per 24 hour   Intake             1818 ml   Output             1350 ml   Net              468 ml       General Appearance:  alert, well appearing, and in no acute distress  Mental status: oriented to person, place, and time with normal affect  Head:  normocephalic, atraumatic. Eye: no icterus, redness, pupils equal and reactive, extraocular eye movements intact, conjunctiva clear  Ear: normal external ear, no discharge, hearing intact  Nose:  no drainage noted  Mouth: mucous membranes moist  Neck: supple, no carotid bruits, thyroid not palpable  Lungs: Bilateral equal air entry, clear to ausculation, no wheezing, rales or rhonchi, normal effort  Cardiovascular: normal rate, regular rhythm, no murmur, gallop, rub. Abdomen: Soft, nontender, nondistended, normal bowel sounds, no hepatomegaly or splenomegaly  Neurologic: There are no new focal motor or sensory deficits, normal muscle tone and bulk, no abnormal sensation, normal speech, cranial nerves II through XII grossly intact  Skin: No gross lesions, rashes, bruising or bleeding on exposed skin area  Extremities:  peripheral pulses palpable, no pedal edema or calf pain with palpation  Psych: normal affect      Assessment:        Principal Problem:    Unresponsive episode  Active Problems:    Depression    CAD (coronary artery disease)    Carotid stenosis    Essential hypertension    COPD exacerbation (HCC)    Hypoxia    Pancytopenia ([address]    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

## 2018-08-28 NOTE — CONSULTS
slurred speech. Denies prior carotid a disease, neck radiation or surgery. Past Medical History:  has a past medical history of Airway obstruction; Alcoholic polyneuropathy ([facility] [address] Angina effort ([facility] [address] Anxiety; Atrial fibrillation ([facility] [address] CAD (coronary artery disease); Cancer [facility]); CHF (congestive heart failure) ([facility] [address] Convulsions ([facility] [address] COPD (chronic obstructive pulmonary disease) ([facility] [address] Degenerative disc disease; Depression; Depression; Diabetes mellitus ([facility] [address] Esophageal reflux; Hypertension; Inflammatory spondylopathy ([facility] [address] Iron deficiency; Lymphoma ([facility] [address] MDRO (multiple drug resistant organisms) resistance; MRSA (methicillin resistant staph aureus) culture positive; Neuropathy; Osteoarthritis; Parkinson's disease ([facility] [address] Peripheral vascular disease ([facility] [address] Restless leg syndrome; Sleep apnea; Transient cerebral ischemia; and Unsteady gait. Past Surgical History:   Past Surgical History:   Procedure Laterality Date    BONE MARROW BIOPSY  11/29/2017    BREAST LUMPECTOMY      right     CHOLECYSTECTOMY      CORONARY ANGIOPLASTY WITH STENT PLACEMENT      unknown heart stents-1.5T MRI     FEMORAL-FEMORAL BYPASS GRAFT      HYSTERECTOMY      LUNG REMOVAL, PARTIAL      Lung CA        Social History:  reports that she has been smoking Cigarettes. She has been smoking about 0.50 packs per day. She has never used smokeless tobacco. She reports that she does not drink alcohol or use drugs. Family History: family history includes Cancer in her mother; Coronary Art Dis in her sister; Heart Disease in her father; Kidney Disease in her mother. Allergies: Naprosyn [naproxen]; Actonel [risedronate sodium]; Albuterol sulfate;  Atenolol; Codeine; Cortisone; and Requip [ropinirole hcl]    Current Meds:  Current Facility-Administered Medications:     0.9 % sodium chloride infusion, , Intravenous, Continuous, RAHEEL Valdivia CNP, Last Rate: 50 mL/hr at 08/27/18 4470    hydrALAZINE (APRESOLINE) injection 10 mg, 10 mg, Intravenous, Q6H PRN, Clara Learn, APRN - CNP, 10 mg at 08/27/18 2016    loperamide (IMODIUM) capsule 2 mg, 2 mg, Oral, 4x Daily PRN, Clara Learn, APRN - CNP, 2 mg at 08/26/18 2244    apixaban (ELIQUIS) tablet 5 mg, 5 mg, Oral, BID, Alfreda Perez MD, 5 mg at 08/28/18 0835    acetaminophen (TYLENOL) tablet 650 mg, 650 mg, Oral, Q4H PRN, Clara Che, APRN - CNP, 650 mg at 08/27/18 2020    nitroGLYCERIN (NITROSTAT) SL tablet 0.4 mg, 0.4 mg, Sublingual, Q5 Min PRN, Alfreda Perez MD    isosorbide mononitrate (IMDUR) extended release tablet 60 mg, 60 mg, Oral, Daily, Alfreda Perez MD, 60 mg at 08/28/18 0417    vitamin B-12 (CYANOCOBALAMIN) tablet 500 mcg, 500 mcg, Oral, Daily, Alfreda Perez MD, 500 mcg at 08/28/18 0835    carvedilol (COREG) tablet 6.25 mg, 6.25 mg, Oral, BID WC, Alfreda Perez MD, 6.25 mg at 08/28/18 0725    gabapentin (NEURONTIN) capsule 300 mg, 300 mg, Oral, BID, Alfreda Perez MD, 300 mg at 08/28/18 0835    carbidopa-levodopa (SINEMET)  MG per tablet 1 tablet, 1 tablet, Oral, 4x Daily, Alfreda Perez MD, 1 tablet at 08/28/18 0835    amLODIPine (NORVASC) tablet 5 mg, 5 mg, Oral, Daily, Alfreda Perez MD, 5 mg at 08/28/18 3827    levothyroxine (SYNTHROID) tablet 25 mcg, 25 mcg, Oral, Daily, Alfreda Perez MD, 25 mcg at 08/28/18 0644    polyethylene glycol (GLYCOLAX) packet 17 g, 17 g, Oral, Daily PRN, Alfreda Perez MD    DULoxetine (CYMBALTA) extended release capsule 90 mg, 90 mg, Oral, Daily, Alfreda Perez MD, 90 mg at 08/28/18 0835    ALPRAZolam (XANAX) tablet 0.5 mg, 0.5 mg, Oral, TID PRN, Alfreda Perez MD, 0.5 mg at 08/27/18 2019    budesonide (PULMICORT) nebulizer suspension 500 mcg, 500 mcg, Nebulization, Q12H PRN, Alfreda Perez MD    ipratropium (ATROVENT) 0.02 % nebulizer solution 0.5 mg, 0.5 mg, Nebulization, 4x Daily PRN, Alfreda Perez MD    aspirin chewable tablet 81 mg, 81 mg, Oral, Daily, Alfreda Perez MD, 81 mg at 08/28/18 0835    mometasone-formoterol (DULERA) 200-5 MCG/ACT inhaler 2 puff, 2 puff, Inhalation, BID, Nikkie Vann MD, 2 puff at 08/28/18 0915    pantoprazole (PROTONIX) tablet 40 mg, 40 mg, Oral, Daily, Nikkie Vann MD, 40 mg at 08/28/18 1209    sodium chloride flush 0.9 % injection 10 mL, 10 mL, Intravenous, BID, Tanisha Fang DO, 10 mL at 08/26/18 2133    pneumococcal 13-valent conjugate (PREVNAR) injection 0.5 mL, 0.5 mL, Intramuscular, Once, Nikkie Vann MD    clopidogrel (PLAVIX) tablet 75 mg, 75 mg, Oral, Daily, Barbara March MD, 75 mg at 08/28/18 5802    REVIEW OF SYSTEMS:      Review of Systems - General ROS: + chronic fatigue negative for - chills, fever or night sweats  Psychological ROS: negative for - anxiety, behavioral disorder or depression  Ophthalmic ROS: negative for - blurry vision, dry eyes or eye pain  ENT ROS: negative for - epistaxis, headaches or nasal congestion  Hematological and Lymphatic ROS: +pancytopenia, MDR negative for - bleeding problems, blood clots or bruising  Endocrine ROS: negative for - malaise/lethargy, mood swings or palpitations  Respiratory ROS: h/o non-small cell lung cancer negative for - cough, hemoptysis or shortness of breath  Cardiovascular ROS: no chest pain or dyspnea on exertion  Gastrointestinal ROS: see hpi   Genito-Urinary ROS: no dysuria, trouble voiding, or hematuria  Musculoskeletal ROS:weakness all extremities, numbness/tingling all extremities.    Neurological ROS: h/o CVA no stroke like symptom  Dermatological ROS: negative for dry skin and eczema      PHYSICAL EXAM:    VITALS:  BP (!) 140/93   Pulse 80   Temp 98 °F (36.7 °C) (Oral)   Resp 16   Ht 4' 11\" (1.499 m)   Wt 119 lb 6.4 oz (54.2 kg)   SpO2 95%   BMI 24.12 kg/m²   INTAKE/OUTPUT:     Intake/Output Summary (Last 24 hours) at 08/28/18 1220  Last data filed at 08/28/18 0734   Gross per 24 hour   Intake             1818 ml   Output             1350 ml   Net to reduce the radiation dose to as low as reasonably achievable. COMPARISON:  None    HISTORY:  ORDERING SYSTEM PROVIDED HISTORY: stroke  TECHNOLOGIST PROVIDED HISTORY:      FINDINGS:    CTA NECK:    AORTIC ARCH/ARCH VESSELS: There is a normal branch pattern of the aortic  arch. No significant stenosis is seen of the innominate artery or subclavian  arteries. CAROTID ARTERIES: Right internal carotid artery mixed density plaques narrow  the lumen to 1.4 mm, distally averaging 5 mm.  Left internal carotid artery  calcified plaques do not result in significant stenosis.  The common carotid  arteries demonstrate peripheral atheromatous plaques but remain patent. VERTEBRAL ARTERIES: There is multifocal luminal irregularities of the right  vertebral artery contributing to multifocal mild stenosis without occlusion. Left vertebral artery is patent with minimal atheromatous plaques of the V4  segment and origin. SOFT TISSUES: Emphysematous changes of the lung apices.  Diminutive right  thyroid lobe    BONES: The visualized osseous structures appear unremarkable. CTA HEAD:    ANTERIOR CIRCULATION: Carotid siphon calcifications without significant  stenosis.  The anterior cerebral and middle cerebral arteries appear patent. POSTERIOR CIRCULATION: The posterior cerebral arteries demonstrate no focal  stenosis. The vertebral and basilar arteries appear unremarkable. BRAIN: No mass effect or midline shift. No abnormal extra-axial fluid  collection. The gray-white differentiation appears grossly maintained.     Impression:       Nondominant right vertebral artery multifocal mild stenosis due to  atheromatous plaques. Right internal carotid artery origin 70% stenosis by NASCET criteria.  Patent  left ICA. Intracranial arteries without acute findings.     Findings were conveyed to Dr. Alondra Pena At 7:00 am on 8/24/2018.     CTA HEAD W Patel Seymour [[id number]] Collected: 08/24/18 0650     Order Status: Completed Updated: 08/25/18 2224     Narrative:       EXAMINATION:  CTA OF THE HEAD WITH CONTRAST; CTA OF THE NECK 8/24/2018 6:36 am:    TECHNIQUE:  CTA of the head/brain was performed with the administration of intravenous  contrast. Multiplanar reformatted images are provided for review.  MIP images  are provided for review. Dose modulation, iterative reconstruction, and/or  weight based adjustment of the mA/kV was utilized to reduce the radiation  dose to as low as reasonably achievable.; CTA of the neck was performed with  the administration of intravenous contrast. Multiplanar reformatted images  are provided for review.  MIP images are provided for review. Stenosis of the  internal carotid arteries measured using NASCET criteria. Dose modulation,  iterative reconstruction, and/or weight based adjustment of the mA/kV was  utilized to reduce the radiation dose to as low as reasonably achievable. COMPARISON:  None    HISTORY:  ORDERING SYSTEM PROVIDED HISTORY: stroke  TECHNOLOGIST PROVIDED HISTORY:      FINDINGS:    CTA NECK:    AORTIC ARCH/ARCH VESSELS: There is a normal branch pattern of the aortic  arch. No significant stenosis is seen of the innominate artery or subclavian  arteries. CAROTID ARTERIES: Right internal carotid artery mixed density plaques narrow  the lumen to 1.4 mm, distally averaging 5 mm.  Left internal carotid artery  calcified plaques do not result in significant stenosis.  The common carotid  arteries demonstrate peripheral atheromatous plaques but remain patent. VERTEBRAL ARTERIES: There is multifocal luminal irregularities of the right  vertebral artery contributing to multifocal mild stenosis without occlusion. Left vertebral artery is patent with minimal atheromatous plaques of the V4  segment and origin. SOFT TISSUES: Emphysematous changes of the lung apices.  Diminutive right  thyroid lobe    BONES: The visualized osseous structures appear unremarkable.       CTA HEAD:    ANTERIOR within the spleen measuring 2.5 cm in greatest  dimension.     CT ABDOMEN PELVIS W IV CONTRAST Additional Contrast? None [[id number]] Collected: 08/25/18 1330     Order Status: Completed Updated: 08/25/18 1342     Narrative:       EXAMINATION:  CT OF THE ABDOMEN AND PELVIS WITH CONTRAST 8/25/2018 1:25 pm    TECHNIQUE:  CT of the abdomen and pelvis was performed with the administration of  intravenous contrast. Multiplanar reformatted images are provided for review. Dose modulation, iterative reconstruction, and/or weight based adjustment of  the mA/kV was utilized to reduce the radiation dose to as low as reasonably  achievable. COMPARISON:  CT abdomen and pelvis February 9, 2016. HISTORY:  ORDERING SYSTEM PROVIDED HISTORY: ABDOMINAL PAIN  TECHNOLOGIST PROVIDED HISTORY:      FINDINGS:  Lower Chest: Mild bibasilar scarring.  No pericardial or pleural effusions. Organs: Liver, portal vein, pancreas and adrenal glands all appear  unremarkable.  Gallbladder is been surgically removed.  Stable  benign-appearing cystic lesion is noted involving the spleen measuring 2.2 cm  in greatest axial dimension.  Kidneys demonstrate no evidence of stone or  hydronephrosis.  There is severe calcification of the abdominal aorta and its  major branches without aneurysm. GI/Bowel: Stomach is grossly unremarkable.  Small bowel appears nondilated. Sigmoid and descending colon diverticulosis is noted.  No acute colonic  abnormality is seen.  Appendix is not clearly identified. Pelvis: Urinary bladder is grossly unremarkable.  Fem-fem bypass graft is  noted.  Uterus has been surgically removed.  No adnexal mass or cyst.    Peritoneum/Retroperitoneum: No free air, free fluid or lymphadenopathy. Bones/Soft Tissues: Abdominal wall demonstrates no acute findings.  Osseous  structures demonstrate degenerative change.  There appears to be  vertebroplasty changes involving the right sacral ala.     Impression:       1.  No acute sequelae of a prior ischemic or traumatic  insult.     CT HEAD WO CONTRAST [[id number]] Collected: 08/24/18 0610     Order Status: Completed Updated: 08/24/18 0795     Narrative:       EXAMINATION:  CT OF THE HEAD WITHOUT CONTRAST  8/24/2018 6:08 am    TECHNIQUE:  CT of the head was performed without the administration of intravenous  contrast. Dose modulation, iterative reconstruction, and/or weight based  adjustment of the mA/kV was utilized to reduce the radiation dose to as low  as reasonably achievable. COMPARISON:  May 5, 2018    HISTORY:  ORDERING SYSTEM PROVIDED HISTORY: MENTAL STATUS CHANGE (AFTER TRAUMA)  TECHNOLOGIST PROVIDED HISTORY:      FINDINGS:  BRAIN/VENTRICLES: There is no acute intracranial hemorrhage, mass effect or  midline shift.  The gray-white differentiation is maintained without evidence  of an acute infarct.  There is no evidence of hydrocephalus. Right greater  than left basal ganglia and cerebellar chronic lacunar infarcts present. Patchy cerebral white matter hypoattenuation.  Prominent extra-axial spaces  along the biparietal convexity again noted. ORBITS: The visualized portion of the orbits demonstrate no acute abnormality. SINUSES: The visualized paranasal sinuses and mastoid air cells demonstrate  no acute abnormality. SOFT TISSUES/SKULL:  No acute abnormality of the visualized skull or soft  tissues.     Impression:       No acute intracranial abnormality. Stable chronic microangiopathic ischemic changes and scattered chronic  lacunar infarcts.     Findings were conveyed to Dr. Vern White At 6:18 am on 8/24/2018.     XR CHEST PORTABLE [[id number]] Collected: 08/24/18 4104     Order Status: Completed Updated: 08/24/18 0645     Narrative:       EXAMINATION:  SINGLE XRAY VIEW OF THE CHEST    8/24/2018 6:09 am    COMPARISON:  05/05/2018    HISTORY:  ORDERING SYSTEM PROVIDED HISTORY: stroke alert  TECHNOLOGIST PROVIDED HISTORY:  stroke alert    FINDINGS:  Right internal abdominal pain found to have SMA stensosis > 70% on mesenteric duplex. - R ICA stenosis  - chronic mesenteric ischemia   - pvd   - significant atherosclerotic disease of abdominal aorta, b/l renal a, SMA, ROSELYN and iliac/femoral disease. Recommendation:    1. Continue supportive care per primary. 2. Carotid duplex b/l  3. For chronic mesenteric ischemia would recommend tobacco cessation, anti-platelet therapy, meal supplementation with boost/ensure to insure adequate nutrition. Rec trial of non-operative management. Will discuss with attending  Dr. Jen Gonzalez. Discussed with Dr. Jen Gonzalez, patient ok to discharge after Carotid US. Have patient follow up as outpt for discussion of Carotid US and possible mesenteric angiogram.   - continue anti-platelet  - smoking cessation  - meal supplementation with boost/ensure, along with smaller more frequent meals  - follow up in 1-2 weeks with Dr. Jen Gonzalez.        Mayito Hubbard  8/28/2018
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
## 2018-09-07 ENCOUNTER — HOSPITAL ENCOUNTER (OUTPATIENT)
Age: 78
Setting detail: SPECIMEN
Discharge: HOME | End: 2018-09-07
Payer: MEDICARE

## 2018-09-07 LAB — VITAMIN B-12: 708 PG/ML (ref 232–1245)

## 2018-09-07 PROCEDURE — 36415 COLL VENOUS BLD VENIPUNCTURE: CPT

## 2018-09-07 PROCEDURE — 82607 VITAMIN B-12: CPT

## 2018-09-07 PROCEDURE — P9603 ONE-WAY ALLOW PRORATED MILES: HCPCS

## 2018-09-19 ENCOUNTER — INITIAL CONSULT (OUTPATIENT)
Dept: PALLATIVE CARE | Age: 78
End: 2018-09-19
Payer: MEDICARE

## 2018-09-19 VITALS
WEIGHT: 114 LBS | DIASTOLIC BLOOD PRESSURE: 43 MMHG | TEMPERATURE: 97.8 F | SYSTOLIC BLOOD PRESSURE: 90 MMHG | BODY MASS INDEX: 23.03 KG/M2

## 2018-09-19 DIAGNOSIS — R29.6 FREQUENT FALLS: ICD-10-CM

## 2018-09-19 DIAGNOSIS — K55.1 MESENTERIC ARTERY STENOSIS (HCC): ICD-10-CM

## 2018-09-19 DIAGNOSIS — D61.818 PANCYTOPENIA (HCC): ICD-10-CM

## 2018-09-19 DIAGNOSIS — G20 PARKINSON'S DISEASE (HCC): Primary | ICD-10-CM

## 2018-09-19 PROCEDURE — 99204 OFFICE O/P NEW MOD 45 MIN: CPT | Performed by: INTERNAL MEDICINE

## 2018-09-19 PROCEDURE — G8420 CALC BMI NORM PARAMETERS: HCPCS | Performed by: INTERNAL MEDICINE

## 2018-09-19 PROCEDURE — 1101F PT FALLS ASSESS-DOCD LE1/YR: CPT | Performed by: INTERNAL MEDICINE

## 2018-09-19 PROCEDURE — 1090F PRES/ABSN URINE INCON ASSESS: CPT | Performed by: INTERNAL MEDICINE

## 2018-09-19 PROCEDURE — G8427 DOCREV CUR MEDS BY ELIG CLIN: HCPCS | Performed by: INTERNAL MEDICINE

## 2018-09-19 RX ORDER — HYDROCODONE BITARTRATE AND ACETAMINOPHEN 5; 325 MG/1; MG/1
1 TABLET ORAL EVERY 6 HOURS PRN
Status: ON HOLD | COMMUNITY
End: 2018-10-25

## 2018-09-19 RX ORDER — MIRTAZAPINE 15 MG/1
15 TABLET, FILM COATED ORAL NIGHTLY
COMMUNITY

## 2018-09-19 ASSESSMENT — ENCOUNTER SYMPTOMS
BLOOD IN STOOL: 0
BLURRED VISION: 0
DIARRHEA: 0
ABDOMINAL PAIN: 1
VOMITING: 0
DOUBLE VISION: 0
COUGH: 0
ROS SKIN COMMENTS: BRUISING
SHORTNESS OF BREATH: 0
NAUSEA: 0
CONSTIPATION: 0
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
## 2018-10-23 ENCOUNTER — APPOINTMENT (OUTPATIENT)
Dept: GENERAL RADIOLOGY | Age: 78
DRG: 313 | End: 2018-10-23
Payer: MEDICARE

## 2018-10-23 ENCOUNTER — HOSPITAL ENCOUNTER (INPATIENT)
Age: 78
LOS: 2 days | Discharge: NURSING, REHAB OR LONG-TERM CARE | DRG: 313 | End: 2018-10-25
Attending: EMERGENCY MEDICINE | Admitting: INTERNAL MEDICINE
Payer: MEDICARE

## 2018-10-23 DIAGNOSIS — G62.1 ALCOHOLIC PERIPHERAL NEUROPATHY (HCC): ICD-10-CM

## 2018-10-23 DIAGNOSIS — R07.9 CHEST PAIN, UNSPECIFIED TYPE: Primary | ICD-10-CM

## 2018-10-23 DIAGNOSIS — M54.6 CHRONIC MIDLINE THORACIC BACK PAIN: ICD-10-CM

## 2018-10-23 DIAGNOSIS — F41.9 ANXIETY: Chronic | ICD-10-CM

## 2018-10-23 DIAGNOSIS — G89.29 CHRONIC MIDLINE THORACIC BACK PAIN: ICD-10-CM

## 2018-10-23 PROBLEM — R07.89 CHEST PAIN RADIATING TO UPPER EXTREMITY: Status: ACTIVE | Noted: 2018-10-23

## 2018-10-23 PROBLEM — D53.9 MACROCYTIC ANEMIA: Status: ACTIVE | Noted: 2018-05-05

## 2018-10-23 PROBLEM — E83.51 HYPOCALCEMIA: Status: ACTIVE | Noted: 2018-10-23

## 2018-10-23 PROBLEM — D69.6 THROMBOCYTOPENIA (HCC): Status: ACTIVE | Noted: 2018-10-23

## 2018-10-23 PROBLEM — R79.89 ELEVATED BRAIN NATRIURETIC PEPTIDE (BNP) LEVEL: Status: ACTIVE | Noted: 2018-10-23

## 2018-10-23 LAB
ABO/RH: NORMAL
ANION GAP SERPL CALCULATED.3IONS-SCNC: 8 MMOL/L (ref 9–17)
ANTIBODY SCREEN: NEGATIVE
ARM BAND NUMBER: NORMAL
BLD PROD TYP BPU: NORMAL
BLD PROD TYP BPU: NORMAL
BNP INTERPRETATION: ABNORMAL
BUN BLDV-MCNC: 21 MG/DL (ref 8–23)
BUN/CREAT BLD: ABNORMAL (ref 9–20)
CALCIUM SERPL-MCNC: 8 MG/DL (ref 8.6–10.4)
CHLORIDE BLD-SCNC: 102 MMOL/L (ref 98–107)
CO2: 34 MMOL/L (ref 20–31)
CREAT SERPL-MCNC: 0.78 MG/DL (ref 0.5–0.9)
CROSSMATCH RESULT: NORMAL
CROSSMATCH RESULT: NORMAL
D-DIMER QUANTITATIVE: 1.04 MG/L FEU
DISPENSE STATUS BLOOD BANK: NORMAL
DISPENSE STATUS BLOOD BANK: NORMAL
EKG ATRIAL RATE: 90 BPM
EKG P AXIS: 70 DEGREES
EKG P-R INTERVAL: 134 MS
EKG Q-T INTERVAL: 352 MS
EKG QRS DURATION: 74 MS
EKG QTC CALCULATION (BAZETT): 430 MS
EKG R AXIS: 72 DEGREES
EKG T AXIS: 70 DEGREES
EKG VENTRICULAR RATE: 90 BPM
EXPIRATION DATE: NORMAL
GFR AFRICAN AMERICAN: >60 ML/MIN
GFR NON-AFRICAN AMERICAN: >60 ML/MIN
GFR SERPL CREATININE-BSD FRML MDRD: ABNORMAL ML/MIN/{1.73_M2}
GFR SERPL CREATININE-BSD FRML MDRD: ABNORMAL ML/MIN/{1.73_M2}
GLUCOSE BLD-MCNC: 85 MG/DL (ref 65–105)
GLUCOSE BLD-MCNC: 87 MG/DL (ref 70–99)
HCT VFR BLD CALC: 26.6 % (ref 36.3–47.1)
HEMOGLOBIN: 8.3 G/DL (ref 11.9–15.1)
INR BLD: 1.2
LACTIC ACID, WHOLE BLOOD: 0.4 MMOL/L (ref 0.7–2.1)
MCH RBC QN AUTO: 33.2 PG (ref 25.2–33.5)
MCHC RBC AUTO-ENTMCNC: 31.2 G/DL (ref 28.4–34.8)
MCV RBC AUTO: 106.4 FL (ref 82.6–102.9)
NRBC AUTOMATED: 0 PER 100 WBC
PARTIAL THROMBOPLASTIN TIME: >120 SEC (ref 20.5–30.5)
PDW BLD-RTO: 16.5 % (ref 11.8–14.4)
PLATELET # BLD: 98 K/UL (ref 138–453)
PMV BLD AUTO: 11 FL (ref 8.1–13.5)
POC TROPONIN I: 0 NG/ML (ref 0–0.1)
POC TROPONIN I: 0.01 NG/ML (ref 0–0.1)
POC TROPONIN INTERP: NORMAL
POC TROPONIN INTERP: NORMAL
POTASSIUM SERPL-SCNC: 3.9 MMOL/L (ref 3.7–5.3)
PRO-BNP: 1275 PG/ML
PROTHROMBIN TIME: 12.8 SEC (ref 9–12)
RBC # BLD: 2.5 M/UL (ref 3.95–5.11)
SODIUM BLD-SCNC: 144 MMOL/L (ref 135–144)
TRANSFUSION STATUS: NORMAL
TRANSFUSION STATUS: NORMAL
TROPONIN INTERP: NORMAL
TROPONIN INTERP: NORMAL
TROPONIN T: <0.03 NG/ML
TROPONIN T: <0.03 NG/ML
UNIT DIVISION: 0
UNIT DIVISION: 0
UNIT NUMBER: NORMAL
UNIT NUMBER: NORMAL
WBC # BLD: 2.6 K/UL (ref 3.5–11.3)

## 2018-10-23 PROCEDURE — 6370000000 HC RX 637 (ALT 250 FOR IP): Performed by: NURSE PRACTITIONER

## 2018-10-23 PROCEDURE — 2580000003 HC RX 258: Performed by: NURSE PRACTITIONER

## 2018-10-23 PROCEDURE — 36415 COLL VENOUS BLD VENIPUNCTURE: CPT

## 2018-10-23 PROCEDURE — 82947 ASSAY GLUCOSE BLOOD QUANT: CPT

## 2018-10-23 PROCEDURE — 84484 ASSAY OF TROPONIN QUANT: CPT

## 2018-10-23 PROCEDURE — 85730 THROMBOPLASTIN TIME PARTIAL: CPT

## 2018-10-23 PROCEDURE — 93005 ELECTROCARDIOGRAM TRACING: CPT

## 2018-10-23 PROCEDURE — 99222 1ST HOSP IP/OBS MODERATE 55: CPT | Performed by: INTERNAL MEDICINE

## 2018-10-23 PROCEDURE — 1200000000 HC SEMI PRIVATE

## 2018-10-23 PROCEDURE — 2580000003 HC RX 258: Performed by: STUDENT IN AN ORGANIZED HEALTH CARE EDUCATION/TRAINING PROGRAM

## 2018-10-23 PROCEDURE — 71045 X-RAY EXAM CHEST 1 VIEW: CPT

## 2018-10-23 PROCEDURE — 2700000000 HC OXYGEN THERAPY PER DAY

## 2018-10-23 PROCEDURE — 86900 BLOOD TYPING SEROLOGIC ABO: CPT

## 2018-10-23 PROCEDURE — 86920 COMPATIBILITY TEST SPIN: CPT

## 2018-10-23 PROCEDURE — 99285 EMERGENCY DEPT VISIT HI MDM: CPT

## 2018-10-23 PROCEDURE — 85379 FIBRIN DEGRADATION QUANT: CPT

## 2018-10-23 PROCEDURE — 94762 N-INVAS EAR/PLS OXIMTRY CONT: CPT

## 2018-10-23 PROCEDURE — 86850 RBC ANTIBODY SCREEN: CPT

## 2018-10-23 PROCEDURE — 80048 BASIC METABOLIC PNL TOTAL CA: CPT

## 2018-10-23 PROCEDURE — 83036 HEMOGLOBIN GLYCOSYLATED A1C: CPT

## 2018-10-23 PROCEDURE — 83880 ASSAY OF NATRIURETIC PEPTIDE: CPT

## 2018-10-23 PROCEDURE — 83605 ASSAY OF LACTIC ACID: CPT

## 2018-10-23 PROCEDURE — 85027 COMPLETE CBC AUTOMATED: CPT

## 2018-10-23 PROCEDURE — 86901 BLOOD TYPING SEROLOGIC RH(D): CPT

## 2018-10-23 PROCEDURE — 94640 AIRWAY INHALATION TREATMENT: CPT

## 2018-10-23 PROCEDURE — 85610 PROTHROMBIN TIME: CPT

## 2018-10-23 RX ORDER — NICOTINE 21 MG/24HR
1 PATCH, TRANSDERMAL 24 HOURS TRANSDERMAL DAILY
Status: DISCONTINUED | OUTPATIENT
Start: 2018-10-23 | End: 2018-10-25 | Stop reason: HOSPADM

## 2018-10-23 RX ORDER — METOPROLOL TARTRATE 5 MG/5ML
2.5 INJECTION INTRAVENOUS PRN
Status: CANCELLED | OUTPATIENT
Start: 2018-10-23 | End: 2018-10-23

## 2018-10-23 RX ORDER — SODIUM CHLORIDE 0.9 % (FLUSH) 0.9 %
10 SYRINGE (ML) INJECTION PRN
Status: DISCONTINUED | OUTPATIENT
Start: 2018-10-23 | End: 2018-10-25 | Stop reason: HOSPADM

## 2018-10-23 RX ORDER — POTASSIUM CHLORIDE 7.45 MG/ML
10 INJECTION INTRAVENOUS PRN
Status: DISCONTINUED | OUTPATIENT
Start: 2018-10-23 | End: 2018-10-25 | Stop reason: HOSPADM

## 2018-10-23 RX ORDER — SODIUM CHLORIDE 9 MG/ML
INJECTION, SOLUTION INTRAVENOUS ONCE
Status: DISCONTINUED | OUTPATIENT
Start: 2018-10-23 | End: 2018-10-23

## 2018-10-23 RX ORDER — ALPRAZOLAM 0.25 MG/1
0.5 TABLET ORAL 3 TIMES DAILY PRN
Status: DISCONTINUED | OUTPATIENT
Start: 2018-10-23 | End: 2018-10-25 | Stop reason: HOSPADM

## 2018-10-23 RX ORDER — CLOPIDOGREL BISULFATE 75 MG/1
75 TABLET ORAL DAILY
Status: DISCONTINUED | OUTPATIENT
Start: 2018-10-23 | End: 2018-10-25 | Stop reason: HOSPADM

## 2018-10-23 RX ORDER — METOPROLOL TARTRATE 5 MG/5ML
2.5 INJECTION INTRAVENOUS PRN
Status: DISCONTINUED | OUTPATIENT
Start: 2018-10-23 | End: 2018-10-23

## 2018-10-23 RX ORDER — ISOSORBIDE MONONITRATE 60 MG/1
60 TABLET, EXTENDED RELEASE ORAL DAILY
Status: DISCONTINUED | OUTPATIENT
Start: 2018-10-23 | End: 2018-10-25 | Stop reason: HOSPADM

## 2018-10-23 RX ORDER — NITROGLYCERIN 0.4 MG/1
0.4 TABLET SUBLINGUAL EVERY 5 MIN PRN
Status: DISCONTINUED | OUTPATIENT
Start: 2018-10-23 | End: 2018-10-23 | Stop reason: SDUPTHER

## 2018-10-23 RX ORDER — DIVALPROEX SODIUM 125 MG/1
125 TABLET, DELAYED RELEASE ORAL 3 TIMES DAILY
Status: DISCONTINUED | OUTPATIENT
Start: 2018-10-23 | End: 2018-10-25 | Stop reason: HOSPADM

## 2018-10-23 RX ORDER — DULOXETIN HYDROCHLORIDE 30 MG/1
90 CAPSULE, DELAYED RELEASE ORAL DAILY
Status: DISCONTINUED | OUTPATIENT
Start: 2018-10-23 | End: 2018-10-25 | Stop reason: HOSPADM

## 2018-10-23 RX ORDER — ASPIRIN 81 MG/1
81 TABLET, CHEWABLE ORAL DAILY
Status: DISCONTINUED | OUTPATIENT
Start: 2018-10-23 | End: 2018-10-25 | Stop reason: HOSPADM

## 2018-10-23 RX ORDER — SODIUM CHLORIDE 9 MG/ML
INJECTION, SOLUTION INTRAVENOUS CONTINUOUS
Status: DISCONTINUED | OUTPATIENT
Start: 2018-10-23 | End: 2018-10-23

## 2018-10-23 RX ORDER — ACETAMINOPHEN 325 MG/1
650 TABLET ORAL EVERY 4 HOURS PRN
Status: DISCONTINUED | OUTPATIENT
Start: 2018-10-23 | End: 2018-10-25 | Stop reason: HOSPADM

## 2018-10-23 RX ORDER — NITROGLYCERIN 0.4 MG/1
0.4 TABLET SUBLINGUAL EVERY 5 MIN PRN
Status: DISCONTINUED | OUTPATIENT
Start: 2018-10-23 | End: 2018-10-25 | Stop reason: HOSPADM

## 2018-10-23 RX ORDER — SODIUM CHLORIDE 9 MG/ML
INJECTION, SOLUTION INTRAVENOUS ONCE
Status: CANCELLED | OUTPATIENT
Start: 2018-10-23 | End: 2018-10-23

## 2018-10-23 RX ORDER — NICOTINE POLACRILEX 4 MG
15 LOZENGE BUCCAL PRN
Status: DISCONTINUED | OUTPATIENT
Start: 2018-10-23 | End: 2018-10-25 | Stop reason: HOSPADM

## 2018-10-23 RX ORDER — LEVOTHYROXINE SODIUM 0.03 MG/1
25 TABLET ORAL DAILY
Status: DISCONTINUED | OUTPATIENT
Start: 2018-10-23 | End: 2018-10-25 | Stop reason: HOSPADM

## 2018-10-23 RX ORDER — MAGNESIUM SULFATE 1 G/100ML
1 INJECTION INTRAVENOUS PRN
Status: DISCONTINUED | OUTPATIENT
Start: 2018-10-23 | End: 2018-10-25 | Stop reason: HOSPADM

## 2018-10-23 RX ORDER — DEXTROSE MONOHYDRATE 25 G/50ML
12.5 INJECTION, SOLUTION INTRAVENOUS PRN
Status: DISCONTINUED | OUTPATIENT
Start: 2018-10-23 | End: 2018-10-25 | Stop reason: HOSPADM

## 2018-10-23 RX ORDER — NITROGLYCERIN 0.4 MG/1
0.4 TABLET SUBLINGUAL EVERY 5 MIN PRN
Status: DISCONTINUED | OUTPATIENT
Start: 2018-10-23 | End: 2018-10-23

## 2018-10-23 RX ORDER — GABAPENTIN 300 MG/1
300 CAPSULE ORAL 2 TIMES DAILY
Status: DISCONTINUED | OUTPATIENT
Start: 2018-10-23 | End: 2018-10-25 | Stop reason: HOSPADM

## 2018-10-23 RX ORDER — ONDANSETRON 2 MG/ML
4 INJECTION INTRAMUSCULAR; INTRAVENOUS EVERY 6 HOURS PRN
Status: DISCONTINUED | OUTPATIENT
Start: 2018-10-23 | End: 2018-10-25 | Stop reason: HOSPADM

## 2018-10-23 RX ORDER — PANTOPRAZOLE SODIUM 40 MG/1
40 TABLET, DELAYED RELEASE ORAL DAILY
Status: DISCONTINUED | OUTPATIENT
Start: 2018-10-23 | End: 2018-10-25 | Stop reason: HOSPADM

## 2018-10-23 RX ORDER — AMINOPHYLLINE DIHYDRATE 25 MG/ML
100 INJECTION, SOLUTION INTRAVENOUS
Status: CANCELLED | OUTPATIENT
Start: 2018-10-23 | End: 2018-10-23

## 2018-10-23 RX ORDER — BUDESONIDE 0.5 MG/2ML
500 INHALANT ORAL EVERY 12 HOURS PRN
Status: DISCONTINUED | OUTPATIENT
Start: 2018-10-23 | End: 2018-10-25 | Stop reason: HOSPADM

## 2018-10-23 RX ORDER — DOCUSATE SODIUM 100 MG/1
100 CAPSULE, LIQUID FILLED ORAL 2 TIMES DAILY
Status: DISCONTINUED | OUTPATIENT
Start: 2018-10-23 | End: 2018-10-24

## 2018-10-23 RX ORDER — SODIUM CHLORIDE 0.9 % (FLUSH) 0.9 %
10 SYRINGE (ML) INJECTION PRN
Status: DISCONTINUED | OUTPATIENT
Start: 2018-10-23 | End: 2018-10-23

## 2018-10-23 RX ORDER — POTASSIUM CHLORIDE 20MEQ/15ML
40 LIQUID (ML) ORAL PRN
Status: DISCONTINUED | OUTPATIENT
Start: 2018-10-23 | End: 2018-10-25 | Stop reason: HOSPADM

## 2018-10-23 RX ORDER — AMLODIPINE BESYLATE 5 MG/1
5 TABLET ORAL DAILY
Status: DISCONTINUED | OUTPATIENT
Start: 2018-10-23 | End: 2018-10-25

## 2018-10-23 RX ORDER — SODIUM CHLORIDE 9 MG/ML
INJECTION, SOLUTION INTRAVENOUS CONTINUOUS
Status: CANCELLED | OUTPATIENT
Start: 2018-10-23

## 2018-10-23 RX ORDER — POTASSIUM CHLORIDE 20 MEQ/1
40 TABLET, EXTENDED RELEASE ORAL PRN
Status: DISCONTINUED | OUTPATIENT
Start: 2018-10-23 | End: 2018-10-25 | Stop reason: HOSPADM

## 2018-10-23 RX ORDER — DEXTROSE MONOHYDRATE 50 MG/ML
100 INJECTION, SOLUTION INTRAVENOUS PRN
Status: DISCONTINUED | OUTPATIENT
Start: 2018-10-23 | End: 2018-10-25 | Stop reason: HOSPADM

## 2018-10-23 RX ORDER — AMINOPHYLLINE DIHYDRATE 25 MG/ML
100 INJECTION, SOLUTION INTRAVENOUS
Status: DISCONTINUED | OUTPATIENT
Start: 2018-10-23 | End: 2018-10-23

## 2018-10-23 RX ORDER — SODIUM CHLORIDE 0.9 % (FLUSH) 0.9 %
10 SYRINGE (ML) INJECTION EVERY 12 HOURS SCHEDULED
Status: DISCONTINUED | OUTPATIENT
Start: 2018-10-23 | End: 2018-10-25 | Stop reason: HOSPADM

## 2018-10-23 RX ORDER — NITROGLYCERIN 0.4 MG/1
0.4 TABLET SUBLINGUAL EVERY 5 MIN PRN
Status: CANCELLED | OUTPATIENT
Start: 2018-10-23 | End: 2018-10-23

## 2018-10-23 RX ORDER — CHOLECALCIFEROL (VITAMIN D3) 125 MCG
500 CAPSULE ORAL DAILY
Status: DISCONTINUED | OUTPATIENT
Start: 2018-10-23 | End: 2018-10-25 | Stop reason: HOSPADM

## 2018-10-23 RX ORDER — MIRTAZAPINE 15 MG/1
15 TABLET, FILM COATED ORAL NIGHTLY
Status: DISCONTINUED | OUTPATIENT
Start: 2018-10-23 | End: 2018-10-25 | Stop reason: HOSPADM

## 2018-10-23 RX ORDER — CARVEDILOL 6.25 MG/1
6.25 TABLET ORAL 2 TIMES DAILY WITH MEALS
Status: DISCONTINUED | OUTPATIENT
Start: 2018-10-23 | End: 2018-10-25 | Stop reason: HOSPADM

## 2018-10-23 RX ORDER — SODIUM CHLORIDE 0.9 % (FLUSH) 0.9 %
10 SYRINGE (ML) INJECTION PRN
Status: CANCELLED | OUTPATIENT
Start: 2018-10-23 | End: 2018-10-23

## 2018-10-23 RX ORDER — BISACODYL 10 MG
10 SUPPOSITORY, RECTAL RECTAL DAILY PRN
Status: DISCONTINUED | OUTPATIENT
Start: 2018-10-23 | End: 2018-10-25 | Stop reason: HOSPADM

## 2018-10-23 RX ORDER — ATORVASTATIN CALCIUM 40 MG/1
40 TABLET, FILM COATED ORAL NIGHTLY
Status: DISCONTINUED | OUTPATIENT
Start: 2018-10-23 | End: 2018-10-25 | Stop reason: HOSPADM

## 2018-10-23 RX ORDER — ACETAMINOPHEN 325 MG/1
650 TABLET ORAL EVERY 6 HOURS PRN
Status: DISCONTINUED | OUTPATIENT
Start: 2018-10-23 | End: 2018-10-23 | Stop reason: SDUPTHER

## 2018-10-23 RX ADMIN — DULOXETINE HYDROCHLORIDE 90 MG: 30 CAPSULE, DELAYED RELEASE ORAL at 10:19

## 2018-10-23 RX ADMIN — CARVEDILOL 6.25 MG: 6.25 TABLET, FILM COATED ORAL at 20:36

## 2018-10-23 RX ADMIN — CLOPIDOGREL 75 MG: 75 TABLET, FILM COATED ORAL at 10:19

## 2018-10-23 RX ADMIN — GABAPENTIN 300 MG: 300 CAPSULE ORAL at 10:19

## 2018-10-23 RX ADMIN — APIXABAN 5 MG: 5 TABLET, FILM COATED ORAL at 20:36

## 2018-10-23 RX ADMIN — MOMETASONE FUROATE AND FORMOTEROL FUMARATE DIHYDRATE 2 PUFF: 200; 5 AEROSOL RESPIRATORY (INHALATION) at 21:19

## 2018-10-23 RX ADMIN — ISOSORBIDE MONONITRATE 60 MG: 60 TABLET ORAL at 10:18

## 2018-10-23 RX ADMIN — Medication 10 ML: at 20:37

## 2018-10-23 RX ADMIN — SODIUM CHLORIDE: 9 INJECTION, SOLUTION INTRAVENOUS at 03:02

## 2018-10-23 RX ADMIN — CARBIDOPA AND LEVODOPA 1 TABLET: 25; 100 TABLET ORAL at 10:19

## 2018-10-23 RX ADMIN — LEVOTHYROXINE SODIUM 25 MCG: 25 TABLET ORAL at 10:18

## 2018-10-23 RX ADMIN — CARBIDOPA AND LEVODOPA 1 TABLET: 25; 100 TABLET ORAL at 16:10

## 2018-10-23 RX ADMIN — GABAPENTIN 300 MG: 300 CAPSULE ORAL at 20:36

## 2018-10-23 RX ADMIN — ASPIRIN 81 MG: 81 TABLET, CHEWABLE ORAL at 10:19

## 2018-10-23 RX ADMIN — DIVALPROEX SODIUM 125 MG: 125 TABLET, DELAYED RELEASE ORAL at 16:10

## 2018-10-23 RX ADMIN — AMLODIPINE BESYLATE 5 MG: 5 TABLET ORAL at 10:19

## 2018-10-23 RX ADMIN — CARVEDILOL 6.25 MG: 6.25 TABLET, FILM COATED ORAL at 10:19

## 2018-10-23 RX ADMIN — DESMOPRESSIN ACETATE 40 MG: 0.2 TABLET ORAL at 20:36

## 2018-10-23 RX ADMIN — Medication 10 ML: at 10:20

## 2018-10-23 RX ADMIN — Medication 500 MCG: at 10:18

## 2018-10-23 RX ADMIN — PANTOPRAZOLE SODIUM 40 MG: 40 TABLET, DELAYED RELEASE ORAL at 10:18

## 2018-10-23 RX ADMIN — DIVALPROEX SODIUM 125 MG: 125 TABLET, DELAYED RELEASE ORAL at 20:36

## 2018-10-23 RX ADMIN — DOCUSATE SODIUM 100 MG: 100 CAPSULE, LIQUID FILLED ORAL at 20:36

## 2018-10-23 RX ADMIN — DOCUSATE SODIUM 100 MG: 100 CAPSULE, LIQUID FILLED ORAL at 10:19

## 2018-10-23 RX ADMIN — APIXABAN 5 MG: 5 TABLET, FILM COATED ORAL at 10:19

## 2018-10-23 RX ADMIN — DIVALPROEX SODIUM 125 MG: 125 TABLET, DELAYED RELEASE ORAL at 10:19

## 2018-10-23 RX ADMIN — CARBIDOPA AND LEVODOPA 1 TABLET: 25; 100 TABLET ORAL at 20:36

## 2018-10-23 RX ADMIN — MIRTAZAPINE 15 MG: 15 TABLET, FILM COATED ORAL at 20:36

## 2018-10-23 ASSESSMENT — HEART SCORE: ECG: 0

## 2018-10-23 ASSESSMENT — ENCOUNTER SYMPTOMS
ABDOMINAL DISTENTION: 0
NAUSEA: 0
SHORTNESS OF BREATH: 0
VOMITING: 0
COUGH: 0

## 2018-10-23 ASSESSMENT — PAIN DESCRIPTION - PAIN TYPE: TYPE: ACUTE PAIN

## 2018-10-23 ASSESSMENT — PAIN DESCRIPTION - LOCATION: LOCATION: ARM;CHEST

## 2018-10-23 ASSESSMENT — PAIN DESCRIPTION - DESCRIPTORS: DESCRIPTORS: DISCOMFORT

## 2018-10-23 ASSESSMENT — PAIN DESCRIPTION - ORIENTATION: ORIENTATION: RIGHT;LEFT

## 2018-10-23 ASSESSMENT — PAIN DESCRIPTION - FREQUENCY: FREQUENCY: CONTINUOUS

## 2018-10-23 ASSESSMENT — PAIN SCALES - GENERAL: PAINLEVEL_OUTOF10: 4

## 2018-10-23 NOTE — PROGRESS NOTES
Patient arrived to 2017, oriented to room and call light. Patient resting comfortable, NAD at this time. Will continue to monitor.

## 2018-10-23 NOTE — H&P
x-ray:  Impression:     Stable examination with right greater than left lung base scarring or  atelectasis and elevated right hemidiaphragm. Right chest Port-A-Cath. Stress test 2015:  Findings:  There is breast attenuation and motion on the stress and resting images. Overall, the study is interpretable.        The stress SPECT images reveal small to moderate perfusion defect with mild severity in the inferior wall with slight extension to the inferolateral segment which appears better on the resting images indicating reversibility. The defect and reversibility    persisted on the attenuation corrected images. Otherwise, there is normal the ventricular myocardial perfusion with no definite stress-induced perfusion defect. Echo 2016:  Summary  Left ventricle is normal in size and wall thickness. Global left ventricular systolic function is normal.  Estimated ejection fraction is 65 % . No significant wall motion abnormality seen. Left atrium is mildly dilated. Mild mitral regurgitation. No significant pericardial effusion is seen    Results for Ivana Page (MRN [id number]) as of 10/23/2018 14:44   Ref. Range 7/6/2013 00:25 7/6/2013 03:54 8/20/2015 21:58 3/1/2016 22:30 7/13/2016 12:06   D-Dimer, Louise Libia Latest Units: mg/L FEU 2.20 (H) 2.28 (H) 0.90 1.08 1.27     Venous scan 2016:   Normal venous duplex study of the bilateral lower extremity deep and    superficial venous system. Questionable old, chronic phlebitis in the    right lesser saphenous vein.    Incidental note is made of fluid collection on the left with A-V fistula    like flow associated. No color flow noted in both RLE grafts. Results for Ivana Page (MRN [id number]) as of 10/23/2018 14:44   Ref. Range 8/12/2013 04:55 7/14/2016 07:49 2/27/2018 09:39 5/6/2018 06:08 8/24/2018 05:53   Folate Latest Ref Range: >4.8 ng/mL 10.9 >20.0 6.7 9.5 13.3   Results for Ivana Page (MRN [id number]) as of 10/23/2018 14:44   Ref.  Range 7/14/2016 Wt 119 lb 11.2 oz (54.3 kg)   SpO2 96%   BMI 24.18 kg/m²   Temp (24hrs), Av °F (36.7 °C), Min:97.9 °F (36.6 °C), Max:98.2 °F (36.8 °C)    No results for input(s): POCGLU in the last 72 hours. No intake or output data in the 24 hours ending 10/23/18 1503    Physical Exam   Constitutional: No distress. HENT:   Head: Normocephalic and atraumatic. Eyes: EOM are normal. No scleral icterus. Neck: Neck supple. No JVD present. Cardiovascular: Normal rate, regular rhythm and normal heart sounds. Pulmonary/Chest: Effort normal and breath sounds normal. No respiratory distress. She has no wheezes. Abdominal: Soft. Bowel sounds are normal. She exhibits no distension and no mass. There is no tenderness. There is no guarding. Musculoskeletal: She exhibits edema (1/4at the ankles). Decreased muscle mass   Neurological: She exhibits normal muscle tone (no tremor or rigidity at this time). Somnolent  Having trouble with historical data  Appears to be moving all 4 extremities  Limited ability to follow commands   Skin: Skin is warm and dry. She is not diaphoretic. Psychiatric:   Not very conversant  Not very cooperative          Investigations:      Laboratory Testing:  Recent Results (from the past 24 hour(s))   EKG 12 Lead    Collection Time: 10/23/18 12:57 AM   Result Value Ref Range    Ventricular Rate 90 BPM    Atrial Rate 90 BPM    P-R Interval 134 ms    QRS Duration 74 ms    Q-T Interval 352 ms    QTc Calculation (Bazett) 430 ms    P Axis 70 degrees    R Axis 72 degrees    T Axis 70 degrees   POCT troponin    Collection Time: 10/23/18  1:10 AM   Result Value Ref Range    POC Troponin I 0.00 0.00 - 0.10 ng/mL    POC Troponin Interp       The Troponin-I (POC) results cannot be compared to the Troponin-T results.    Basic Metabolic Panel    Collection Time: 10/23/18  1:15 AM   Result Value Ref Range    Glucose 87 70 - 99 mg/dL    BUN 21 8 - 23 mg/dL    CREATININE 0.78 0.50 - 0.90 mg/dL    Bun/Cre Ratio Code Leukocyte Reduced Red Cell     Unit Divison 0     Dispense Status ALLOCATED     Transfusion Status OK TO TRANSFUSE     Crossmatch Result COMPATIBLE    Troponin    Collection Time: 10/23/18  5:31 AM   Result Value Ref Range    Troponin T <0.03 <0.03 ng/mL    Troponin Interp         Troponin    Collection Time: 10/23/18  9:33 AM   Result Value Ref Range    Troponin T <0.03 <0.03 ng/mL    Troponin Interp             Imaging/Diagnostics:  See above    Assessment :     Principal Problem:    Chest pain  Active Problems:    COPD (chronic obstructive pulmonary disease) (HCC)    Alcoholic peripheral neuropathy (HCC)    Parkinson's disease (HCC)    Restless leg syndrome    Altered mental status    Carotid stenosis    Coronary artery disease involving native coronary artery with unstable angina pectoris (HCC)    Essential hypertension    Somnolence    Macrocytic anemia    Encephalopathy    Pancytopenia (HCC)    Mesenteric artery stenosis (HCC)    Chest pain radiating to upper extremity    Thrombocytopenia (HCC)    Hypocalcemia    Elevated brain natriuretic peptide (BNP) level    Alcoholic polyneuropathy (HCC)    Atrial fibrillation (HCC)    Cancer (HCC)    Convulsions (HCC)    Diabetes mellitus ([address]    Sleep apnea  Resolved Problems:    * No resolved hospital problems. *        Plan:     [facility]  Cardiology reevaluation  Optimize cardio-pulmonary function  Check d-dimer  Blood Pressure - Monitor and control  Glycemic contol - monitor and control blood sugars  Suggest outpatient hem-onc consultation for pancytopenia / Hx of breast cancer / Hx lymphoma  Transfuse as needed  Check stool for occult blood ×3  Physical therapy and rehabilitation    Correct electrolyte abnormalities   Social service consult  Continue Eliquis - history of A.  Fib  Sinemet  Neurology consult for Parkinson's, restless leg syndrome, encephalopathy    Consultations:   IP CONSULT TO INTERNAL MEDICINE  IP CONSULT TO HOSPITALIST  IP CONSULT TO

## 2018-10-23 NOTE — ED NOTES
Bed: 29  Expected date:   Expected time:   Means of arrival:   Comments:  [facility]  10/23/18 7303

## 2018-10-23 NOTE — ED PROVIDER NOTES
[address] [facility]  Emergency Department  Emergency Medicine Resident Sign-out     Care of Kilo Wilson was assumed from Dr. Amee Ngo and is being seen for Chest Pain (x 30 min prior to arrival)  . The patient's initial evaluation and plan have been discussed with the prior provider who initially evaluated the patient. EMERGENCY DEPARTMENT COURSE / MEDICAL DECISION MAKING:       MEDICATIONS GIVEN:  Orders Placed This Encounter   Medications    0.9 % sodium chloride infusion       LABS / RADIOLOGY:     Labs Reviewed   BASIC METABOLIC PANEL - Abnormal; Notable for the following:        Result Value    Calcium 8.0 (*)     CO2 34 (*)     Anion Gap 8 (*)     All other components within normal limits   CBC - Abnormal; Notable for the following:     WBC 2.6 (*)     RBC 2.50 (*)     Hemoglobin 8.3 (*)     Hematocrit 26.6 (*)     .4 (*)     RDW 16.5 (*)     Platelets 98 (*)     All other components within normal limits   BRAIN NATRIURETIC PEPTIDE - Abnormal; Notable for the following:     Pro-BNP 1,275 (*)     All other components within normal limits   LACTIC ACID, WHOLE BLOOD - Abnormal; Notable for the following:     Lactic Acid, Whole Blood 0.4 (*)     All other components within normal limits   URINALYSIS   PROTIME-INR   APTT   POCT TROPONIN   POCT TROPONIN   POCT TROPONIN   POCT TROPONIN   TYPE AND SCREEN   TYPE AND SCREEN   PREPARE RBC (CROSSMATCH)       Xr Chest Portable    Result Date: 10/23/2018  EXAMINATION: SINGLE XRAY VIEW OF THE CHEST 10/23/2018 1:48 am COMPARISON: August 24, 2018 HISTORY: ORDERING SYSTEM PROVIDED HISTORY: chest pain TECHNOLOGIST PROVIDED HISTORY: chest pain FINDINGS: Right IJ Port-A-Cath with tip projecting over the right atrium. Elevated right hemidiaphragm. Bibasilar subsegmental opacities again noted. No new lobar lung consolidation. No pneumothorax or large pleural effusion. The cardiomediastinal silhouette is stable.   Right mediastinal surgical clips

## 2018-10-23 NOTE — PROGRESS NOTES
81 mg by mouth daily    Historical Provider, MD   divalproex (DEPAKOTE) 125 MG DR tablet Take 125 mg by mouth 3 times daily     Historical Provider, MD   vitamin B-12 (CYANOCOBALAMIN) 500 MCG tablet Take 500 mcg by mouth daily    Historical Provider, MD   carvedilol (COREG) 6.25 MG tablet Take 6.25 mg by mouth 2 times daily (with meals)    Historical Provider, MD   gabapentin (NEURONTIN) 300 MG capsule Take 300 mg by mouth 2 times daily . Historical Provider, MD   carbidopa-levodopa (SINEMET)  MG per tablet Take 1 tablet by mouth 4 times daily     Historical Provider, MD   isosorbide mononitrate (IMDUR) 60 MG CR tablet Take 1 tablet by mouth daily. 3/25/15   Amee Jones MD   nitroGLYCERIN (NITROSTAT) 0.4 MG SL tablet Place 1 tablet under the tongue every 5 minutes as needed for Chest pain. 12/18/13   Segun Moncada MD   .  Recent Surgical History: None = 0     Assessment   Received pt on 3LNC, sleepy but woke for assessment, no distress noted. Pt states she uses 2LNC at night at home, has COPD.  Denies MARCOS      RR 15  Breath Sounds: clear and diminished t/o      · Bronchodilator assessment at level  1  ·   ·   · [x]    Bronchodilator Assessment  BRONCHODILATOR ASSESSMENT SCORE  Score 0 1 2 3 4 5   Breath Sounds   []  Patient Baseline [x]  No Wheeze good aeration []  Faint, scattered wheezing, good aeration []  Expiratory Wheezing and or moderately diminished []  Insp/Exp wheeze and/or very diminished []  Insp/Exp and/ or marked distress   Respiratory Rate   []  Patient Baseline [x]  Less than 20 []  Less than 20 []  20-25 []  Greater than 25 []  Greater than 25   Peak flow % of Pred or PB [x]  NA   []  Greater than 90%  []  81-90% []  71-80% []  Less than or equal to 70%  or unable to perform []  Unable due to Respiratory Distress   Dyspnea re []  Patient Baseline [x]  No SOB []  No SOB []  SOB on exertion []  SOB min activity []  At rest/acute   e FEV% Predicted       [x]  NA []  Above 69%  []  Unable

## 2018-10-23 NOTE — ED NOTES
Pt resting in bed, eyes closed. Even non labored RR. Cardiac monitor maintained. Will continue to monitor.         Floretta Kocher, RN  10/23/18 2008

## 2018-10-23 NOTE — ED PROVIDER NOTES
[address] [facility]     Emergency Department     Faculty Note/ Attestation      Pt Name: Hua Morton                                       MRN: [id number]  Hayden 1940  Date of evaluation: 10/23/2018    Patients PCP:    No primary care provider on file. Attestation  I performed a history and physical examination of the patient and discussed management with the resident. I reviewed the residents note and agree with the documented findings and plan of care. Any areas of disagreement are noted on the chart. I was personally present for the key portions of any procedures. I have documented in the chart those procedures where I was not present during the key portions. I have reviewed the emergency nurses triage note. I agree with the chief complaint, past medical history, past surgical history, allergies, medications, social and family history as documented unless otherwise noted below. For Physician Assistant/ Nurse Practitioner cases/documentation I have personally evaluated this patient and have completed at least one if not all key elements of the E/M (history, physical exam, and MDM). Additional findings are as noted. Initial Screens:     Heart Score for chest pain patients  History:  Moderately Suspicious  ECG: Normal  Patient Age: > 65 years  Risk Factors: > 3 Risk factors or history of atherosclerotic disease*  Troponin: < 1X normal limit  Heart Score Total: 5       Vitals:    Vitals:    10/23/18 0054 10/23/18 0131 10/23/18 0201   BP: (!) 140/57 (!) 153/45 (!) 150/58   Pulse: 93 84 82   Resp: 14 16 15   Temp: 98.2 °F (36.8 °C)     TempSrc: Oral     SpO2: 94% 96% 96%   Weight: 114 lb (51.7 kg)     Height: 4' 11\" (1.499 m)         [facility]       Chief Complaint   Patient presents with    Chest Pain     x 30 min prior to arrival             DIAGNOSTIC RESULTS             RADIOLOGY:   XR CHEST PORTABLE   Final Result   Stable examination with right greater than left

## 2018-10-24 ENCOUNTER — APPOINTMENT (OUTPATIENT)
Dept: CT IMAGING | Age: 78
DRG: 313 | End: 2018-10-24
Payer: MEDICARE

## 2018-10-24 PROBLEM — R29.6 FALLING EPISODES: Status: ACTIVE | Noted: 2018-10-24

## 2018-10-24 PROBLEM — I65.01 STENOSIS OF RIGHT VERTEBRAL ARTERY: Status: ACTIVE | Noted: 2018-10-24

## 2018-10-24 LAB
ALBUMIN SERPL-MCNC: 3.2 G/DL (ref 3.5–5.2)
ALBUMIN/GLOBULIN RATIO: 1.1 (ref 1–2.5)
ALP BLD-CCNC: 53 U/L (ref 35–104)
ALT SERPL-CCNC: <5 U/L (ref 5–33)
ANION GAP SERPL CALCULATED.3IONS-SCNC: 6 MMOL/L (ref 9–17)
AST SERPL-CCNC: 15 U/L
BILIRUB SERPL-MCNC: 0.46 MG/DL (ref 0.3–1.2)
BUN BLDV-MCNC: 16 MG/DL (ref 8–23)
BUN/CREAT BLD: ABNORMAL (ref 9–20)
CALCIUM IONIZED: 1.08 MMOL/L (ref 1.13–1.33)
CALCIUM SERPL-MCNC: 8.4 MG/DL (ref 8.6–10.4)
CHLORIDE BLD-SCNC: 99 MMOL/L (ref 98–107)
CHOLESTEROL/HDL RATIO: 2.7
CHOLESTEROL: 123 MG/DL
CO2: 34 MMOL/L (ref 20–31)
CREAT SERPL-MCNC: 0.66 MG/DL (ref 0.5–0.9)
ESTIMATED AVERAGE GLUCOSE: <71 MG/DL
GFR AFRICAN AMERICAN: >60 ML/MIN
GFR NON-AFRICAN AMERICAN: >60 ML/MIN
GFR SERPL CREATININE-BSD FRML MDRD: ABNORMAL ML/MIN/{1.73_M2}
GFR SERPL CREATININE-BSD FRML MDRD: ABNORMAL ML/MIN/{1.73_M2}
GLUCOSE BLD-MCNC: 100 MG/DL (ref 65–105)
GLUCOSE BLD-MCNC: 182 MG/DL (ref 65–105)
GLUCOSE BLD-MCNC: 51 MG/DL (ref 65–105)
GLUCOSE BLD-MCNC: 63 MG/DL (ref 70–99)
GLUCOSE BLD-MCNC: 84 MG/DL (ref 65–105)
GLUCOSE BLD-MCNC: 97 MG/DL (ref 65–105)
GLUCOSE BLD-MCNC: 98 MG/DL (ref 65–105)
HBA1C MFR BLD: <4.1 % (ref 4–6)
HCT VFR BLD CALC: 29.2 % (ref 36.3–47.1)
HDLC SERPL-MCNC: 46 MG/DL
HEMOGLOBIN: 9.8 G/DL (ref 11.9–15.1)
LDL CHOLESTEROL: 50 MG/DL (ref 0–130)
LV EF: 68 %
LVEF MODALITY: NORMAL
MAGNESIUM: 1.9 MG/DL (ref 1.6–2.6)
MCH RBC QN AUTO: 33.6 PG (ref 25.2–33.5)
MCHC RBC AUTO-ENTMCNC: 33.6 G/DL (ref 28.4–34.8)
MCV RBC AUTO: 100 FL (ref 82.6–102.9)
NRBC AUTOMATED: 0 PER 100 WBC
PDW BLD-RTO: 15.7 % (ref 11.8–14.4)
PLATELET # BLD: ABNORMAL K/UL (ref 138–453)
PLATELET, FLUORESCENCE: 109 K/UL (ref 138–453)
PLATELET, IMMATURE FRACTION: 2.4 % (ref 1.1–10.3)
PMV BLD AUTO: ABNORMAL FL (ref 8.1–13.5)
POTASSIUM SERPL-SCNC: 4.3 MMOL/L (ref 3.7–5.3)
RBC # BLD: 2.92 M/UL (ref 3.95–5.11)
SODIUM BLD-SCNC: 139 MMOL/L (ref 135–144)
TOTAL PROTEIN: 6.2 G/DL (ref 6.4–8.3)
TRIGL SERPL-MCNC: 135 MG/DL
VLDLC SERPL CALC-MCNC: NORMAL MG/DL (ref 1–30)
WBC # BLD: 1.9 K/UL (ref 3.5–11.3)

## 2018-10-24 PROCEDURE — 93306 TTE W/DOPPLER COMPLETE: CPT

## 2018-10-24 PROCEDURE — 99222 1ST HOSP IP/OBS MODERATE 55: CPT | Performed by: PSYCHIATRY & NEUROLOGY

## 2018-10-24 PROCEDURE — 1200000000 HC SEMI PRIVATE

## 2018-10-24 PROCEDURE — 94760 N-INVAS EAR/PLS OXIMETRY 1: CPT

## 2018-10-24 PROCEDURE — 83735 ASSAY OF MAGNESIUM: CPT

## 2018-10-24 PROCEDURE — 2700000000 HC OXYGEN THERAPY PER DAY

## 2018-10-24 PROCEDURE — 6370000000 HC RX 637 (ALT 250 FOR IP): Performed by: INTERNAL MEDICINE

## 2018-10-24 PROCEDURE — 80061 LIPID PANEL: CPT

## 2018-10-24 PROCEDURE — 70450 CT HEAD/BRAIN W/O DYE: CPT

## 2018-10-24 PROCEDURE — 94640 AIRWAY INHALATION TREATMENT: CPT

## 2018-10-24 PROCEDURE — 80053 COMPREHEN METABOLIC PANEL: CPT

## 2018-10-24 PROCEDURE — 85055 RETICULATED PLATELET ASSAY: CPT

## 2018-10-24 PROCEDURE — 82947 ASSAY GLUCOSE BLOOD QUANT: CPT

## 2018-10-24 PROCEDURE — 99232 SBSQ HOSP IP/OBS MODERATE 35: CPT | Performed by: INTERNAL MEDICINE

## 2018-10-24 PROCEDURE — 85027 COMPLETE CBC AUTOMATED: CPT

## 2018-10-24 PROCEDURE — 2580000003 HC RX 258: Performed by: NURSE PRACTITIONER

## 2018-10-24 PROCEDURE — 82330 ASSAY OF CALCIUM: CPT

## 2018-10-24 PROCEDURE — 6370000000 HC RX 637 (ALT 250 FOR IP): Performed by: NURSE PRACTITIONER

## 2018-10-24 PROCEDURE — 36415 COLL VENOUS BLD VENIPUNCTURE: CPT

## 2018-10-24 RX ORDER — LOPERAMIDE HYDROCHLORIDE 2 MG/1
2 CAPSULE ORAL 4 TIMES DAILY PRN
Status: DISCONTINUED | OUTPATIENT
Start: 2018-10-24 | End: 2018-10-25 | Stop reason: HOSPADM

## 2018-10-24 RX ADMIN — CARBIDOPA AND LEVODOPA 1 TABLET: 25; 100 TABLET ORAL at 20:52

## 2018-10-24 RX ADMIN — CARBIDOPA AND LEVODOPA 1 TABLET: 25; 100 TABLET ORAL at 17:42

## 2018-10-24 RX ADMIN — PANTOPRAZOLE SODIUM 40 MG: 40 TABLET, DELAYED RELEASE ORAL at 08:24

## 2018-10-24 RX ADMIN — APIXABAN 5 MG: 5 TABLET, FILM COATED ORAL at 08:24

## 2018-10-24 RX ADMIN — GABAPENTIN 300 MG: 300 CAPSULE ORAL at 20:52

## 2018-10-24 RX ADMIN — AMLODIPINE BESYLATE 5 MG: 5 TABLET ORAL at 08:24

## 2018-10-24 RX ADMIN — INSULIN LISPRO 1 UNITS: 100 INJECTION, SOLUTION INTRAVENOUS; SUBCUTANEOUS at 23:29

## 2018-10-24 RX ADMIN — DIVALPROEX SODIUM 125 MG: 125 TABLET, DELAYED RELEASE ORAL at 15:17

## 2018-10-24 RX ADMIN — CARVEDILOL 6.25 MG: 6.25 TABLET, FILM COATED ORAL at 08:24

## 2018-10-24 RX ADMIN — Medication 500 MCG: at 08:24

## 2018-10-24 RX ADMIN — DESMOPRESSIN ACETATE 40 MG: 0.2 TABLET ORAL at 20:52

## 2018-10-24 RX ADMIN — MIRTAZAPINE 15 MG: 15 TABLET, FILM COATED ORAL at 20:54

## 2018-10-24 RX ADMIN — MOMETASONE FUROATE AND FORMOTEROL FUMARATE DIHYDRATE 2 PUFF: 200; 5 AEROSOL RESPIRATORY (INHALATION) at 20:37

## 2018-10-24 RX ADMIN — CARVEDILOL 6.25 MG: 6.25 TABLET, FILM COATED ORAL at 20:53

## 2018-10-24 RX ADMIN — ALPRAZOLAM 0.5 MG: 0.25 TABLET ORAL at 20:50

## 2018-10-24 RX ADMIN — MOMETASONE FUROATE AND FORMOTEROL FUMARATE DIHYDRATE 2 PUFF: 200; 5 AEROSOL RESPIRATORY (INHALATION) at 09:14

## 2018-10-24 RX ADMIN — ASPIRIN 81 MG: 81 TABLET, CHEWABLE ORAL at 08:24

## 2018-10-24 RX ADMIN — Medication 10 ML: at 20:53

## 2018-10-24 RX ADMIN — DOCUSATE SODIUM 100 MG: 100 CAPSULE, LIQUID FILLED ORAL at 08:24

## 2018-10-24 RX ADMIN — DIVALPROEX SODIUM 125 MG: 125 TABLET, DELAYED RELEASE ORAL at 20:52

## 2018-10-24 RX ADMIN — APIXABAN 5 MG: 5 TABLET, FILM COATED ORAL at 20:53

## 2018-10-24 RX ADMIN — LEVOTHYROXINE SODIUM 25 MCG: 25 TABLET ORAL at 08:24

## 2018-10-24 RX ADMIN — DULOXETINE HYDROCHLORIDE 90 MG: 30 CAPSULE, DELAYED RELEASE ORAL at 08:24

## 2018-10-24 RX ADMIN — DIVALPROEX SODIUM 125 MG: 125 TABLET, DELAYED RELEASE ORAL at 08:24

## 2018-10-24 RX ADMIN — ISOSORBIDE MONONITRATE 60 MG: 60 TABLET ORAL at 08:24

## 2018-10-24 RX ADMIN — CLOPIDOGREL 75 MG: 75 TABLET, FILM COATED ORAL at 08:24

## 2018-10-24 RX ADMIN — CARBIDOPA AND LEVODOPA 1 TABLET: 25; 100 TABLET ORAL at 15:17

## 2018-10-24 RX ADMIN — GABAPENTIN 300 MG: 300 CAPSULE ORAL at 08:24

## 2018-10-24 RX ADMIN — CARBIDOPA AND LEVODOPA 1 TABLET: 25; 100 TABLET ORAL at 08:24

## 2018-10-24 RX ADMIN — Medication 10 ML: at 08:29

## 2018-10-24 RX ADMIN — LOPERAMIDE HYDROCHLORIDE 2 MG: 2 CAPSULE ORAL at 17:41

## 2018-10-24 ASSESSMENT — ENCOUNTER SYMPTOMS
VOMITING: 0
NAUSEA: 0
COUGH: 0
ABDOMINAL PAIN: 0
WHEEZING: 0
SHORTNESS OF BREATH: 0

## 2018-10-24 ASSESSMENT — PAIN SCALES - GENERAL: PAINLEVEL_OUTOF10: 0

## 2018-10-24 NOTE — CONSULTS
and concentration normal  Short term memory normal  Language process and speech normal . No aphasia   Cranial nerve 2 normal acuety and visual fields  Cranial nerve 3, 4 and 6 . Extraocular muscles are intact . Pupils are equal and reactive   Cranial nerve 5 . Intact corneal reflex. Normal facial sensation  Cranial nerve 7 normal exam   Cranial nerve 8. Grossly intact hearing   Cranial nerve 9 and 10. Symmetric palate elevation   Cranial nerve 11 , 5 out of 5 strength   Cranial Nerve 12 midline tongue . No atrophy  Sensation . Decreased pinprick and light touch distal lower extremities   Deep Tendon Reflexes hypoactive   Plantar response flexor bilaterally    Assessment :    Falling episodes . Parkinson's disease . Right ICA stenosis  Chest pain . Atrial fibrillation    Plan:    Head CT to rule out cerebra hemorrage with recent fall and facial bruising . Neuro interventional consultation for right ICA stenosis .  If okay would favor coumadin and aspirin stopping plavix

## 2018-10-24 NOTE — PROGRESS NOTES
Neurological:   More alert  Still having trouble with historical data  No focal deficits    Skin: Skin is warm and dry. She is not diaphoretic. Ecchymosis of right face and arm   Psychiatric:   Affect flat         Data:     I/O (24Hr): Intake/Output Summary (Last 24 hours) at 10/24/18 1434  Last data filed at 10/24/18 0955   Gross per 24 hour   Intake              400 ml   Output              900 ml   Net             -500 ml       Labs:    Hematology:  Recent Labs      10/23/18   0115  10/24/18   0459   WBC  2.6*  1.9*   HGB  8.3*  9.8*   HCT  26.6*  29.2*   PLT  98*  See Reflexed IPF Result   INR  1.2   --      Chemistry:  Recent Labs      10/23/18   0115  10/24/18   0459   NA  144  139   K  3.9  4.3   CL  102  99   CO2  34*  34*   GLUCOSE  87  63*   BUN  21  16   CREATININE  0.78  0.66   MG   --   1.9   CALCIUM  8.0*  8.4*   CAION   --   1.08*     Recent Labs      10/23/18   0110  10/23/18   0305  10/23/18   1806  10/24/18   0459   PROT   --    --    --   6.2*   LABALBU   --    --    --   3.2*   LABA1C   --    --   <4.1   --    AST   --    --    --   15   ALT   --    --    --   <5*   ALKPHOS   --    --    --   53   BILITOT   --    --    --   0.46   CHOL   --    --    --   123   TRIG   --    --    --   135   HDL   --    --    --   46   TROPONINI  0.00  0.01   --    --        Lab Results   Component Value Date/Time    SPECIAL NOT REPORTED 05/05/2018 06:49 PM     Lab Results   Component Value Date/Time    CULTURE  05/05/2018 06:49 PM     Performed at [facility], [address] [place] [address] [phone]    CULTURE (A) 05/05/2018 06:49 PM     POSITIVE BLOOD CULTURE, RN NOTIFIED: DEANDRA ELENA, 5/6/18, 11:03. CULTURE (A) 05/05/2018 06:49 PM     DIRECT GRAM STAIN FROM BOTTLE: GRAM POSITIVE COCCI IN PAIRS AND CHAINS    CULTURE PNAFISH negative. Culture results to follow.  (A) 05/05/2018 06:49 PM    CULTURE (A) 05/05/2018 06:49 PM     VIRIDANS STREPTOCOCCUS GROUP TWO MORPHOLOGIES A single positive blood

## 2018-10-24 NOTE — CONSULTS
History     Social History    Marital status: Single     Spouse name: N/A    Number of children: N/A    Years of education: N/A     Occupational History    Not on file.      Social History Main Topics    Smoking status: Current Every Day Smoker     Packs/day: 0.50     Types: Cigarettes    Smokeless tobacco: Never Used    Alcohol use No    Drug use: No    Sexual activity: Not on file     Other Topics Concern    Not on file     Social History Narrative    No narrative on file     Family History:   Family History   Problem Relation Age of Onset    Kidney Disease Mother     Cancer Mother     Heart Disease Father     Coronary Art Dis Sister        · REVIEW OF SYSTEMS   CONSTITUTIONAL: negative  EYES:  negative  HEENT:  negative  RESPIRATORY: negative Mild chronic dyspnea on exertion  CARDIOVASCULAR:  Negative, Except for chest pain and left anterior chest area  GASTROINTESTINAL:  negative  GENITOURINARY:  negative  INTEGUMENT/BREAST:  negative  HEMATOLOGIC: negative  ENDOCRINE:  negative  MUSCULOSKELETAL:  negative except for recent fall with ecchymosis of the right side of face and right upper body  NEUROLOGICAL:  negative    PHYSICAL EXAM:    Vitals:    VITALS:  BP (!) 169/64   Pulse 84   Temp 98.1 °F (36.7 °C) (Oral)   Resp 9   Ht 4' 11\" (1.499 m)   Wt 117 lb 1.6 oz (53.1 kg)   SpO2 93%   BMI 23.65 kg/m²   24HR INTAKE/OUTPUT:    Intake/Output Summary (Last 24 hours) at 10/24/18 1448  Last data filed at 10/24/18 0955   Gross per 24 hour   Intake              400 ml   Output              900 ml   Net             -500 ml       CONSTITUTIONAL:  awake, alert, cooperative, no apparent distress, and appears stated age  EYES: Sclera clear, conjunctiva normal  ENT:  normocepalic, without obvious abnormality  NECK:  supple, symmetrical, trachea midline, no carotid bruit, no JVD  BACK:  symmetric  LUNGS: Non-labored, good air exchange, clear to auscultation bilaterally, no crackles or wheezing  CARDIOVASCULAR:  Regular rate and rhythm, normal S1 and S2, no S3 or S4, USK7/7 apical systolic murmur noted, no rub. Chest pain with palpation to the left mid chest area by left breast that reproduces her exact pain. dorsalis pedis, posterior tibial, femoral and bilateral, faint, both feet are warm  ABDOMEN:  Normal bowel sounds, soft, non-distended, non-tender  MUSCULOSKELETAL:  there is no redness, warmth, or swelling of the joints   No leg edema. NEUROLOGIC:  Awake, alert, oriented to name, place and time.   SKIN:  no bruising or bleeding, normal skin color, texture, turgor and no jaundice    DATA:   ECG:  Date:  10/23/2018  I have reviewed EKG with the following interpretation:  Sinus rhythm with non-specific T wave abnormalities    ECHO: Date:6/2016 EF 65%, mild MR     Cardiology Labs:  Recent Labs      10/23/18   0531  10/23/18   0933   TROPONINT  <0.03  <0.03     Warfarin PT/INR:  Lab Results   Component Value Date    PROTIME 12.8 10/23/2018    PROTIME 10.7 03/12/2012    INR 1.2 10/23/2018     CBC:  Lab Results   Component Value Date    WBC 1.9 10/24/2018    RBC 2.92 10/24/2018    RBC 4.15 03/12/2012    HGB 9.8 10/24/2018    HCT 29.2 10/24/2018    .0 10/24/2018    MCH 33.6 10/24/2018    MCHC 33.6 10/24/2018    RDW 15.7 10/24/2018    PLT See Reflexed IPF Result 10/24/2018     03/12/2012    MPV NOT REPORTED 10/24/2018     CMP:  Lab Results   Component Value Date     10/24/2018    K 4.3 10/24/2018    CL 99 10/24/2018    CO2 34 10/24/2018    BUN 16 10/24/2018    CREATININE 0.66 10/24/2018    GFRAA >60 10/24/2018    LABGLOM >60 10/24/2018    GLUCOSE 63 10/24/2018    GLUCOSE 122 05/17/2012    CALCIUM 8.4 10/24/2018     Magnesium:    Lab Results   Component Value Date    MG 1.9 10/24/2018     PTT:    Lab Results   Component Value Date    APTT >120.0 10/23/2018     TSH:    Lab Results   Component Value Date    TSH 0.80 06/22/2018     BMP:  Lab Results   Component Value Date

## 2018-10-24 NOTE — CONSULTS
Daily    Historical Provider, MD   polyethylene glycol (MIRALAX) powder Take 17 g by mouth daily as needed     Historical Provider, MD   DULOXETINE HCL PO Take 90 mg by mouth daily Takes 30mg + 60mg caps (=90mg) once daily    Historical Provider, MD   acetaminophen (TYLENOL) 325 MG tablet Take 650 mg by mouth every 6 hours as needed for Pain    Historical Provider, MD   ALPRAZolam (XANAX) 0.5 MG tablet Take 0.5 mg by mouth 3 times daily as needed for Anxiety. Historical Provider, MD   budesonide (PULMICORT) 0.5 MG/2ML nebulizer suspension Take 1 ampule by nebulization every 12 hours as needed (COPD)    Historical Provider, MD   ipratropium (ATROVENT) 0.02 % nebulizer solution Take 0.5 mg by nebulization 4 times daily as needed (wheezing or SOB)    Historical Provider, MD   aspirin 81 MG chewable tablet Take 81 mg by mouth daily    Historical Provider, MD   divalproex (DEPAKOTE) 125 MG DR tablet Take 125 mg by mouth 3 times daily     Historical Provider, MD   vitamin B-12 (CYANOCOBALAMIN) 500 MCG tablet Take 500 mcg by mouth daily    Historical Provider, MD   carvedilol (COREG) 6.25 MG tablet Take 6.25 mg by mouth 2 times daily (with meals)    Historical Provider, MD   gabapentin (NEURONTIN) 300 MG capsule Take 300 mg by mouth 2 times daily . Historical Provider, MD   carbidopa-levodopa (SINEMET)  MG per tablet Take 1 tablet by mouth 4 times daily     Historical Provider, MD   isosorbide mononitrate (IMDUR) 60 MG CR tablet Take 1 tablet by mouth daily. 3/25/15   Grant Arredondo MD   nitroGLYCERIN (NITROSTAT) 0.4 MG SL tablet Place 1 tablet under the tongue every 5 minutes as needed for Chest pain.  12/18/13   Brien Lozano MD    Scheduled Meds:   amLODIPine  5 mg Oral Daily    apixaban  5 mg Oral BID    aspirin  81 mg Oral Daily    carbidopa-levodopa  1 tablet Oral 4x Daily    carvedilol  6.25 mg Oral BID WC    clopidogrel  75 mg Oral Daily    divalproex  125 mg Oral TID    DULoxetine  90 mg Oral Daily  gabapentin  300 mg Oral BID    isosorbide mononitrate  60 mg Oral Daily    levothyroxine  25 mcg Oral Daily    mirtazapine  15 mg Oral Nightly    mometasone-formoterol  2 puff Inhalation BID    pantoprazole  40 mg Oral Daily    vitamin B-12  500 mcg Oral Daily    sodium chloride flush  10 mL Intravenous 2 times per day    docusate sodium  100 mg Oral BID    atorvastatin  40 mg Oral Nightly    nicotine  1 patch Transdermal Daily    insulin lispro  0-6 Units Subcutaneous TID WC    insulin lispro  0-3 Units Subcutaneous Nightly     Continuous Infusions:   dextrose       PRN Meds:. ALPRAZolam, budesonide, ipratropium, sodium chloride flush, potassium chloride **OR** potassium chloride **OR** potassium chloride, potassium chloride, magnesium sulfate, acetaminophen, magnesium hydroxide, bisacodyl, ondansetron, nitroGLYCERIN, glucose, dextrose, glucagon (rDNA), dextrose  Past Medical History   has a past medical history of Airway obstruction; Alcoholic polyneuropathy ([facility] Angina effort ([facility] Anxiety; Atrial fibrillation ([facility] CAD (coronary artery disease); Cancer [facility]); CHF (congestive heart failure) ([facility] Convulsions ([facility] COPD (chronic obstructive pulmonary disease) ([facility] Degenerative disc disease; Depression; Diabetes mellitus ([facility] Esophageal reflux; Hypertension; Inflammatory spondylopathy ([facility] Iron deficiency; Lymphoma ([facility] MDRO (multiple drug resistant organisms) resistance; MRSA (methicillin resistant staph aureus) culture positive; Neuropathy; Osteoarthritis; Parkinson's disease ([facility] Peripheral vascular disease ([facility] Restless leg syndrome; Sleep apnea; Transient cerebral ischemia; and Unsteady gait. Past Surgical History   has a past surgical history that includes Cholecystectomy; Hysterectomy; Lung removal, partial; Breast lumpectomy; Coronary angioplasty with stent; Femoral-femoral Bypass Graft; and bone marrow biopsy (11/29/2017).   Social History   reports that she has been

## 2018-10-25 ENCOUNTER — APPOINTMENT (OUTPATIENT)
Dept: GENERAL RADIOLOGY | Age: 78
DRG: 313 | End: 2018-10-25
Payer: MEDICARE

## 2018-10-25 VITALS
WEIGHT: 117.1 LBS | SYSTOLIC BLOOD PRESSURE: 201 MMHG | BODY MASS INDEX: 23.61 KG/M2 | DIASTOLIC BLOOD PRESSURE: 73 MMHG | TEMPERATURE: 97.9 F | OXYGEN SATURATION: 98 % | HEIGHT: 59 IN | HEART RATE: 84 BPM | RESPIRATION RATE: 16 BRPM

## 2018-10-25 LAB
GLUCOSE BLD-MCNC: 111 MG/DL (ref 65–105)
GLUCOSE BLD-MCNC: 82 MG/DL (ref 65–105)
GLUCOSE BLD-MCNC: 99 MG/DL (ref 65–105)
URIC ACID: 7.2 MG/DL (ref 2.4–5.7)

## 2018-10-25 PROCEDURE — 2580000003 HC RX 258: Performed by: NURSE PRACTITIONER

## 2018-10-25 PROCEDURE — 6360000002 HC RX W HCPCS: Performed by: INTERNAL MEDICINE

## 2018-10-25 PROCEDURE — 36415 COLL VENOUS BLD VENIPUNCTURE: CPT

## 2018-10-25 PROCEDURE — 73560 X-RAY EXAM OF KNEE 1 OR 2: CPT

## 2018-10-25 PROCEDURE — 84550 ASSAY OF BLOOD/URIC ACID: CPT

## 2018-10-25 PROCEDURE — 93970 EXTREMITY STUDY: CPT

## 2018-10-25 PROCEDURE — 82947 ASSAY GLUCOSE BLOOD QUANT: CPT

## 2018-10-25 PROCEDURE — 99232 SBSQ HOSP IP/OBS MODERATE 35: CPT | Performed by: NURSE PRACTITIONER

## 2018-10-25 PROCEDURE — 94761 N-INVAS EAR/PLS OXIMETRY MLT: CPT

## 2018-10-25 PROCEDURE — 94003 VENT MGMT INPAT SUBQ DAY: CPT

## 2018-10-25 PROCEDURE — 99239 HOSP IP/OBS DSCHRG MGMT >30: CPT | Performed by: INTERNAL MEDICINE

## 2018-10-25 PROCEDURE — 94002 VENT MGMT INPAT INIT DAY: CPT

## 2018-10-25 PROCEDURE — 6370000000 HC RX 637 (ALT 250 FOR IP): Performed by: NURSE PRACTITIONER

## 2018-10-25 PROCEDURE — 2700000000 HC OXYGEN THERAPY PER DAY

## 2018-10-25 RX ORDER — AMLODIPINE BESYLATE 10 MG/1
10 TABLET ORAL DAILY
Status: DISCONTINUED | OUTPATIENT
Start: 2018-10-26 | End: 2018-10-25 | Stop reason: HOSPADM

## 2018-10-25 RX ORDER — HEPARIN SODIUM (PORCINE) LOCK FLUSH IV SOLN 100 UNIT/ML 100 UNIT/ML
300 SOLUTION INTRAVENOUS
DISCHARGE
Start: 2018-10-25 | End: 2019-02-04 | Stop reason: ALTCHOICE

## 2018-10-25 RX ORDER — METOPROLOL TARTRATE 5 MG/5ML
5 INJECTION INTRAVENOUS EVERY 6 HOURS PRN
Status: DISCONTINUED | OUTPATIENT
Start: 2018-10-25 | End: 2018-10-25 | Stop reason: HOSPADM

## 2018-10-25 RX ORDER — HYDROCODONE BITARTRATE AND ACETAMINOPHEN 5; 325 MG/1; MG/1
1 TABLET ORAL EVERY 6 HOURS PRN
Qty: 20 TABLET | Refills: 0 | Status: SHIPPED | OUTPATIENT
Start: 2018-10-25 | End: 2018-11-01

## 2018-10-25 RX ORDER — ALPRAZOLAM 0.5 MG/1
0.5 TABLET ORAL 3 TIMES DAILY PRN
Qty: 20 TABLET | Refills: 0 | Status: SHIPPED | OUTPATIENT
Start: 2018-10-25 | End: 2018-11-01

## 2018-10-25 RX ORDER — ATORVASTATIN CALCIUM 40 MG/1
40 TABLET, FILM COATED ORAL NIGHTLY
Qty: 30 TABLET | Refills: 3 | Status: SHIPPED | OUTPATIENT
Start: 2018-10-25

## 2018-10-25 RX ORDER — HYDRALAZINE HYDROCHLORIDE 20 MG/ML
10 INJECTION INTRAMUSCULAR; INTRAVENOUS EVERY 6 HOURS PRN
Status: DISCONTINUED | OUTPATIENT
Start: 2018-10-25 | End: 2018-10-25 | Stop reason: HOSPADM

## 2018-10-25 RX ADMIN — ISOSORBIDE MONONITRATE 60 MG: 60 TABLET ORAL at 11:03

## 2018-10-25 RX ADMIN — CLOPIDOGREL 75 MG: 75 TABLET, FILM COATED ORAL at 11:04

## 2018-10-25 RX ADMIN — ALPRAZOLAM 0.5 MG: 0.25 TABLET ORAL at 11:38

## 2018-10-25 RX ADMIN — GABAPENTIN 300 MG: 300 CAPSULE ORAL at 11:04

## 2018-10-25 RX ADMIN — DIVALPROEX SODIUM 125 MG: 125 TABLET, DELAYED RELEASE ORAL at 11:03

## 2018-10-25 RX ADMIN — DULOXETINE HYDROCHLORIDE 90 MG: 30 CAPSULE, DELAYED RELEASE ORAL at 11:04

## 2018-10-25 RX ADMIN — CARVEDILOL 6.25 MG: 6.25 TABLET, FILM COATED ORAL at 11:04

## 2018-10-25 RX ADMIN — CARBIDOPA AND LEVODOPA 1 TABLET: 25; 100 TABLET ORAL at 16:03

## 2018-10-25 RX ADMIN — ACETAMINOPHEN 650 MG: 325 TABLET ORAL at 11:38

## 2018-10-25 RX ADMIN — PANTOPRAZOLE SODIUM 40 MG: 40 TABLET, DELAYED RELEASE ORAL at 11:03

## 2018-10-25 RX ADMIN — CARBIDOPA AND LEVODOPA 1 TABLET: 25; 100 TABLET ORAL at 11:05

## 2018-10-25 RX ADMIN — AMLODIPINE BESYLATE 5 MG: 5 TABLET ORAL at 11:05

## 2018-10-25 RX ADMIN — ASPIRIN 81 MG: 81 TABLET, CHEWABLE ORAL at 11:05

## 2018-10-25 RX ADMIN — Medication 500 MCG: at 11:07

## 2018-10-25 RX ADMIN — DIVALPROEX SODIUM 125 MG: 125 TABLET, DELAYED RELEASE ORAL at 16:03

## 2018-10-25 RX ADMIN — Medication 10 ML: at 11:47

## 2018-10-25 RX ADMIN — APIXABAN 5 MG: 5 TABLET, FILM COATED ORAL at 11:05

## 2018-10-25 RX ADMIN — SODIUM CHLORIDE, PRESERVATIVE FREE 300 UNITS: 5 INJECTION INTRAVENOUS at 17:53

## 2018-10-25 RX ADMIN — LEVOTHYROXINE SODIUM 25 MCG: 25 TABLET ORAL at 11:03

## 2018-10-25 ASSESSMENT — ENCOUNTER SYMPTOMS
VOMITING: 0
NAUSEA: 0
SHORTNESS OF BREATH: 0
ABDOMINAL PAIN: 0
WHEEZING: 0
COUGH: 0

## 2018-10-25 ASSESSMENT — PAIN SCALES - GENERAL: PAINLEVEL_OUTOF10: 4

## 2018-10-25 NOTE — PROGRESS NOTES
[id number] [facility]    Progress Note    10/25/2018    5:29 PM    Name:   Fabiola Hennessy  MRN:     [id number]     Rex Tinocoger:      [de-identified]   Room:   2017/2017-01  IP Day:  2  Admit Date:  10/23/2018 12:52 AM    PCP:   Iveth Lou DO  Code Status:  Full Code    Subjective:     C/C:   Chief Complaint   Patient presents with    Chest Pain     x 30 min prior to arrival     Interval History Status:    Awake  Feeling better  Stronger  \"Back to baseline \"  Hopes to go home  Has been diuresing    The patient reported right knee and upper calf pain this morning - now resolved  Dopplers - preliminary report suggested a noncompressible calf vein, possible DVT? The patient has been on Eliquis    The x-ray:  Impression:   Tricompartmental degenerative changes. No evidence of acute osseous  abnormalities. Uric Acid 7.2 (H)      Brief History:     The patient is a 70 y.o. female who presents with:   Chest Pain (x 30 min prior to arrival)        Patient was admitted thru ER with:  \"Bessie Allison is a 70 y. o. female who presents with acute central chest pain that began this evening while sitting at rest.  Patient is from [address] [place] [address] complaining of central chest pressure with radiation to both arms.  Patient was given aspirin by EMS as well as 1 nitro with some relief.  Patient says she had some nausea during the initial chest pain but no vomiting or diaphoresis.  Patient has a history significant for coronary artery disease with stent placement, CHF, COPD, A. Fib,    Patient denies shortness of breath, fevers or chills or cough.  Patient states she's been eating regularly and drinking fluids.  Patient denies abdominal pain, dysuria, diarrhea any change in bowel or bladder. \"     61-year-old female was admitted through the emergency room with chest pain  She was found to be comfortable/somnolent  The patient is not  very conversant  The patient is having trouble mg Oral TID    DULoxetine  90 mg Oral Daily    gabapentin  300 mg Oral BID    isosorbide mononitrate  60 mg Oral Daily    levothyroxine  25 mcg Oral Daily    mirtazapine  15 mg Oral Nightly    mometasone-formoterol  2 puff Inhalation BID    pantoprazole  40 mg Oral Daily    vitamin B-12  500 mcg Oral Daily    sodium chloride flush  10 mL Intravenous 2 times per day    atorvastatin  40 mg Oral Nightly    nicotine  1 patch Transdermal Daily    insulin lispro  0-6 Units Subcutaneous TID WC    insulin lispro  0-3 Units Subcutaneous Nightly     Continuous Infusions:    dextrose       PRN Meds: hydrALAZINE, metoprolol, loperamide, ALPRAZolam, budesonide, ipratropium, sodium chloride flush, potassium chloride **OR** potassium chloride **OR** potassium chloride, potassium chloride, magnesium sulfate, acetaminophen, magnesium hydroxide, bisacodyl, ondansetron, nitroGLYCERIN, glucose, dextrose, glucagon (rDNA), dextrose      Review of Systems:     Review of Systems   Constitutional: Positive for activity change. Respiratory: Negative for cough, shortness of breath and wheezing. Cardiovascular: Positive for chest pain (anterior chest pain, nonexertional). Negative for palpitations. Gastrointestinal: Negative for abdominal pain, nausea and vomiting. Genitourinary: Negative for flank pain and hematuria. Musculoskeletal: Positive for arthralgias, gait problem, joint swelling and myalgias. The patient has chronic arthralgias and myalgias    Seems to have an exacerbation of her knee pain on 10/25   Skin:        More ecchymosis about right face and arm   Neurological: Positive for dizziness and weakness. Hematological: Bruises/bleeds easily. Psychiatric/Behavioral: Positive for confusion.          Physical Examination:        Vitals:  BP (!) 201/73   Pulse 84   Temp 97.9 °F (36.6 °C) (Oral)   Resp 16   Ht 4' 11\" (1.499 m)   Wt 117 lb 1.6 oz (53.1 kg)   SpO2 98%   BMI 23.65 kg/m²   Temp unstable angina pectoris ([facility]    Essential hypertension    Somnolence    Macrocytic anemia    Encephalopathy    Pancytopenia (HCC)    Mesenteric artery stenosis (HCC)    Chest pain radiating to upper extremity    Thrombocytopenia (HCC)    Hypocalcemia    Elevated brain natriuretic peptide (BNP) level    Alcoholic polyneuropathy (HCC)    Atrial fibrillation (HCC)    Cancer (HCC)    Convulsions (HCC)    Diabetes mellitus ([address]    Sleep apnea    Falling episodes    Stenosis of right vertebral artery    Deep vein thrombosis (DVT) of distal vein of right lower extremity ([facility]  Resolved Problems:    * No resolved hospital problems. *      Plan:        Cardiology reevaluation - atypical chest pain, atrial fibrillation:  Follow-up as scheduled  Reevaluate anticoagulation - aspirin, Plavix, Eliquis  Optimize cardio-pulmonary function  Blood Pressure - Monitor and control,   Glycemic contol - monitor and control blood sugars  Suggest outpatient hem-onc consultation for pancytopenia / Hx of breast cancer / Hx lymphoma  Transfuse as needed  Check stool for occult blood ×3  Physical therapy and rehabilitation    Fall precautions  Correct electrolyte abnormalities   Social service consult  Neurology follow-up - Dr. Lincoln Koroma for Parkinson's  Seizure precautions  Fall precautions  Endovascular consult noted - no intervention planned  Avoid opioids  Avoid benzodiazepines  Discharge planning  Will discharge when arrangements complete and ok with other services.   Follow-up with PCP in one week, Deana Cruz DO  Notify PCP of discharge   DCP 33min+  Med rec done  ABHILASH done       IP CONSULT TO INTERNAL MEDICINE  IP CONSULT TO HOSPITALIST  IP CONSULT TO CARDIOLOGY  IP CONSULT TO SOCIAL WORK  IP CONSULT TO NEUROLOGY  IP CONSULT TO CARDIOLOGY  IP CONSULT TO ENDOVASCULAR [address] [facility], DO  10/25/2018  5:29 PM

## 2018-10-25 NOTE — CARE COORDINATION
Care Transition  [facility] they are okay to accept. [place] [phone] [facility] will dispatch at [phone].     Discharge [facility] Case Management Department  Written by: Thea Cheema RN    Patient Name: Claudette Gastelum  Attending Provider: Lady Keene DO  Admit Date: 10/23/2018 12:52 AM  MRN: [id number]  Account: [de-identified]                     : 1940  Discharge Date:       Disposition: long term care facility [address] [place] [address] JOAQUÍN Otto

## 2018-10-25 NOTE — PROGRESS NOTES
sulfate; atenolol; codeine; cortisone; and requip [ropinirole hcl]. Past Medical History:   Diagnosis Date    Airway obstruction     Alcoholic polyneuropathy (HCC)     Angina effort (HCC)     Anxiety     Atrial fibrillation (HCC)     CAD (coronary artery disease)     s/p stents    Cancer (HCC)     breast, right    CHF (congestive heart failure) (HCC)     unspecified diastolic    Convulsions (HCC)     [facility]     Degenerative disc disease     Cervical    Depression     Diabetes mellitus ([facility]     Esophageal reflux     Hypertension     unspecified    Inflammatory spondylopathy ([facility]     Iron deficiency     Lymphoma ([facility] 1993    MDRO (multiple drug resistant organisms) resistance 8/10/2014    E. Coli urine    MRSA (methicillin resistant staph aureus) culture positive 07/13/2016    nares    Neuropathy     Osteoarthritis     Parkinson's disease ([facility]     Peripheral vascular disease ([facility]     Restless leg syndrome     Sleep apnea     Transient cerebral ischemia     Unsteady gait        Past Surgical History:   Procedure Laterality Date    BONE MARROW BIOPSY  11/29/2017    BREAST LUMPECTOMY      right     CHOLECYSTECTOMY      CORONARY ANGIOPLASTY WITH STENT PLACEMENT      unknown heart stents-1.5T MRI     FEMORAL-FEMORAL BYPASS GRAFT      HYSTERECTOMY      LUNG REMOVAL, PARTIAL      Lung CA        Social History: Tawana Jama  reports that she has been smoking Cigarettes. She has been smoking about 0.50 packs per day. She has never used smokeless tobacco. She reports that she does not drink alcohol or use drugs.     Family History   Problem Relation Age of Onset    Kidney Disease Mother     Cancer Mother     Heart Disease Father     Coronary Art Dis Sister        Objective:   BP (!) 201/73   Pulse 84   Temp 97.9 °F (36.6 °C) (Oral)   Resp 12   Ht 4' 11\" (1.499 m)   Wt 117 lb 1.6 oz (53.1 kg)   SpO2 99%   BMI 23.65 kg/m² Blood pressure range: Systolic (34UBY), UQL:294 , Min:174 , CVX:790   ; Diastolic (41EPG), IWZ:68, Min:61, Max:73      Review of Systems:  Constitutional  Negative for fever and chills    HEENT  Negative for ear discharge, ear pain, nosebleed    Eyes  Negative for photophobia, pain and discharge    Respiratory  Negative for hemoptysis and sputum    Cardiovascular  Negative for orthopnea, claudication and PND    Gastrointestinal  Negative for abdominal pain, diarrhea, blood in stool    Musculoskeletal  Negative for joint pain, negative for myalgia    Skin  Negative for rash or itching    Endo/heme/allergies  Negative for polydipsia, environmental allergy    Psychiatric/behavioral  Negative for suicidal ideation.  Patient is not anxious        NEUROLOGIC EXAMINATION  GENERAL  Appears comfortable and in no distress   HEENT  NC/ AT   NECK  Supple   MENTAL STATUS:  Alert, oriented, intact memory, no confusion, normal speech, normal language, no hallucination or delusion   CRANIAL NERVES: II     -      Visual fields intact to confrontation  III,IV,VI -  EOMs full, no afferent defect, no SHYANN, no ptosis  V     -     Normal facial sensation  VII    -     Normal facial symmetry  VIII   -     Intact hearing  IX,X -     Symmetrical palate  XI    -     Symmetrical shoulder shrug  XII   -     Midline tongue, no atrophy    MOTOR FUNCTION:  significant for good strength of grade 5/5 in bilateral proximal and distal muscle groups of both upper and lower extremities with normal bulk, normal tone and no involuntary movements, no tremor   SENSORY FUNCTION:  Normal touch, normal pin   CEREBELLAR FUNCTION:  Dysmetria with FTN bilaterally    REFLEX FUNCTION:  Symmetric and hypoactive, no perverted reflex, no Babinski sign   STATION and GAIT  Not tested, possible DVT       Data:    Lab Results:   CBC:   Recent Labs      10/23/18   0115  10/24/18   0459   WBC  2.6*  1.9*   HGB  8.3*  9.8*   PLT  98*  See Reflexed IPF Result     BMP:

## 2018-10-25 NOTE — PROGRESS NOTES
Physical Therapy  DATE: 10/25/2018    NAME: Dorene Tuttle  MRN: [id number]   : 1940    Patient not seen this date for Physical Therapy due to:  [] Blood transfusion in progress  [] Hemodialysis  []  Patient Declined  [] Spine Precautions   [] Strict Bedrest  [] Surgery/ Procedure  [] Testing      [x] Other- Dopplers ordered to rule out DVT, not completed during pm check. [] PT being discontinued at this time. Patient independent. No further needs. [] PT being discontinued at this time as the patient has been transferred to palliative care. No further needs.     Neville Huizar, PT

## 2018-10-25 NOTE — PLAN OF CARE
Problem: Falls - Risk of:  Goal: Will remain free from falls  Will remain free from falls   Patient remains free of falls, bed is low and locked, call light within reach, slipper socks are on, floor is free of clutter. Hourly rounding provided by RN. Bed alarm is on. Room door is open. Fall risk sign at doorway, fall risk sticker is on ID band.

## 2018-10-25 NOTE — DISCHARGE SUMMARY
[facility]    Discharge Summary     Patient ID: Gera Crowder  :  1940   MRN: [id number]     ACCOUNT:  [de-identified]   Patient's PCP: Anmol Carbone DO  Admit Date: 10/23/2018   Discharge Date: 10/25/2018    Discharge Physician: Anna Hinkle DO     The patient was seen and examined on day of discharge and this discharge summary is in conjunction with any daily progress note from day of discharge.     Active Discharge Diagnoses:     Primary Problem  Chest pain      Hospital Problems  Active Hospital Problems    Diagnosis Date Noted    Chest pain [R07.9] 12/15/2013     Priority: High    Deep vein thrombosis (DVT) of distal vein of right lower extremity (HCC) [I82.4Z1]     Falling episodes [R29.6] 10/24/2018    Stenosis of right vertebral artery [I65.01] 10/24/2018    Chest pain radiating to upper extremity [R07.89] 10/23/2018    Thrombocytopenia (HCC) [D69.6] 10/23/2018    Hypocalcemia [E83.51] 10/23/2018    Elevated brain natriuretic peptide (BNP) level [R79.89]     Alcoholic polyneuropathy (HCC) [G62.1]     Atrial fibrillation (HCC) [I48.91]     Cancer ([facility] [C80.1]     Convulsions ([address] [R56.9]     Diabetes mellitus ([address] [E11.9]     Sleep apnea [G47.30]     Mesenteric artery stenosis (HCC) [K55.1] 2018    Encephalopathy [G93.40]     Pancytopenia ([address] [D61.818]     Macrocytic anemia [D53.9] 2018    Somnolence [R40.0] 2016    Coronary artery disease involving native coronary artery with unstable angina pectoris ([address] [I25.110]     Essential hypertension [I10]     Stenosis of right carotid artery - 70% right ICA [I65.21] 2014    Altered mental status [R41.82] 12/15/2013    COPD (chronic obstructive pulmonary disease) ([facility] [J44.9] 12/15/2013    Parkinson's disease ([address] Carlin Beer 12/15/2013    Restless leg syndrome [G25.81]     Alcoholic peripheral neuropathy ([facility] [G62.1] 12/15/2013         Hospital Course:     Brief History:  The patient is a 70 y. o. female who presents with:   Chest Pain (x 30 min prior to arrival)        Patient was admitted thru ER with:  \"Bessie Allison is a 70 y. o. female who presents with acute central chest pain that began this evening while sitting at rest.  Patient is from [address] [place] [address] complaining of central chest pressure with radiation to both arms.  Patient was given aspirin by EMS as well as 1 nitro with some relief.  Patient says she had some nausea during the initial chest pain but no vomiting or diaphoresis.  Patient has a history significant for coronary artery disease with stent placement, CHF, COPD, A. Fib,    Patient denies shortness of breath, fevers or chills or cough.  Patient states she's been eating regularly and drinking fluids.  Patient denies abdominal pain, dysuria, diarrhea any change in bowel or bladder. \"     81-year-old female was admitted through the emergency room with chest pain  She was found to be comfortable/somnolent  The patient is not  very conversant  The patient is having trouble providing historical data  She is lying flat  Denies chest pain at this time     Initial data base has included:  WBC 2.6 (L) k/uL RBC 2.50 (L) m/uL Hemoglobin 8.3 (L) g/dL Hematocrit 26.6 (L) % .4 (H) fL MCH 33.2 pg MCHC 31.2 g/dL RDW 16.5 (H) % Platelets 98 (L)     Calcium 8.0 (L)     Pro-BNP 1,275 (H)     Chest x-ray:  Impression:     Stable examination with right greater than left lung base scarring or  atelectasis and elevated right hemidiaphragm. Right chest Port-A-Cath.     Stress test 2015:  Findings:  There is breast attenuation and motion on the stress and resting images.  Overall, the study is interpretable.        The stress SPECT images reveal small to moderate perfusion defect with mild severity in the inferior wall with slight extension to the inferolateral segment which appears better on the resting images Port-A-Cath. Vl Lower Extremity Bilateral Venous Duplex    Result Date: 10/25/2018    [facility]  Vascular Lower Extremities DVT Study Procedure   Patient Name  Ez [address]      Date of Study           10/25/2018                ARCHANA NARAYANAN   Date of Birth 1940  Gender                  Female   Age           70 year(s)  Race                       Room Number   2017        Height:                 59 inch, 149.86 cm   Corporate ID  [id number]  Weight:                 114 pounds, 51.7 kg  #   Patient Acct  [de-identified]   BSA:        1.45 m^2    BMI:       23.03 kg/m^2  #   MR #          [id number]     Sonographer             Merline Love   Accession #   [id number]   Interpreting Physician  Wandy Edwards   Referring                 Referring Physician     Lloyd Nieto,  Nurse  Practitioner  Procedure Type of Study:   Veins: Lower Extremities DVT Study, Venous Scan Lower Bilateral.  Indications for Study:Calf tenderness and R/O DVT. Patient Status: In Patient. Technical Quality:Limited visualization. Limitation reason:grafts, shadowing at both groins .   - Critical Result:RNDali notified of left deep calf vein at 2:29     p.m. Ry Saini Conclusions   Summary   Simultaneous real time imaging utilizing B-Mode, color doppler and  spectral waveform analysis was performed on the left lower extremity for  venous examination of the deep and superficial systems. Findings are:   Left:  One deep calf vein that appears non-compressible for a short isolated  segment.    Signature   ----------------------------------------------------------------  Electronically signed by Debby PereyraSonographer) on  10/25/2018 03:43 PM  ----------------------------------------------------------------   ----------------------------------------------------------------  Electronically signed by Bladimir [facility] physician)  on 10/25/2018 05:02 PM  ---------------------------------------------------------------- Time Spent on discharge is  33 mins in patient examination, evaluation, counseling, medication reconciliation, discharge plan and follow up. Electronically signed by   Robby Johnson DO  10/25/2018  6:41 PM      Thank you Dr. Gold Donald DO for the opportunity to be involved in this patient's care.

## 2018-11-14 ENCOUNTER — HOSPITAL ENCOUNTER (OUTPATIENT)
Dept: INTERVENTIONAL RADIOLOGY/VASCULAR | Age: 78
Discharge: HOME | End: 2018-11-16
Payer: MEDICARE

## 2018-11-14 VITALS
OXYGEN SATURATION: 96 % | BODY MASS INDEX: 23.18 KG/M2 | HEART RATE: 69 BPM | HEIGHT: 59 IN | WEIGHT: 115 LBS | SYSTOLIC BLOOD PRESSURE: 148 MMHG | TEMPERATURE: 97.7 F | RESPIRATION RATE: 12 BRPM | DIASTOLIC BLOOD PRESSURE: 52 MMHG

## 2018-11-14 DIAGNOSIS — K55.1 SMA STENOSIS: ICD-10-CM

## 2018-11-14 LAB
BUN BLDV-MCNC: 29 MG/DL (ref 8–23)
CREAT SERPL-MCNC: 0.81 MG/DL (ref 0.5–0.9)
GFR AFRICAN AMERICAN: >60 ML/MIN
GFR NON-AFRICAN AMERICAN: >60 ML/MIN
GFR SERPL CREATININE-BSD FRML MDRD: ABNORMAL ML/MIN/{1.73_M2}
GFR SERPL CREATININE-BSD FRML MDRD: ABNORMAL ML/MIN/{1.73_M2}
INR BLD: 1.1
PARTIAL THROMBOPLASTIN TIME: 31 SEC (ref 23–31)
PLATELET # BLD: 134 K/UL (ref 130–400)
PROTHROMBIN TIME: 11 SEC (ref 9.7–11.6)

## 2018-11-14 PROCEDURE — C1894 INTRO/SHEATH, NON-LASER: HCPCS

## 2018-11-14 PROCEDURE — 36140 INTRO NDL ICATH UPR/LXTR ART: CPT | Performed by: SURGERY

## 2018-11-14 PROCEDURE — 85730 THROMBOPLASTIN TIME PARTIAL: CPT

## 2018-11-14 PROCEDURE — 36415 COLL VENOUS BLD VENIPUNCTURE: CPT

## 2018-11-14 PROCEDURE — 75710 ARTERY X-RAYS ARM/LEG: CPT | Performed by: SURGERY

## 2018-11-14 PROCEDURE — 6370000000 HC RX 637 (ALT 250 FOR IP): Performed by: SURGERY

## 2018-11-14 PROCEDURE — 82565 ASSAY OF CREATININE: CPT

## 2018-11-14 PROCEDURE — 84520 ASSAY OF UREA NITROGEN: CPT

## 2018-11-14 PROCEDURE — 2580000003 HC RX 258: Performed by: NURSE PRACTITIONER

## 2018-11-14 PROCEDURE — 85049 AUTOMATED PLATELET COUNT: CPT

## 2018-11-14 PROCEDURE — 2580000003 HC RX 258: Performed by: SURGERY

## 2018-11-14 PROCEDURE — 85610 PROTHROMBIN TIME: CPT

## 2018-11-14 PROCEDURE — 7100000011 HC PHASE II RECOVERY - ADDTL 15 MIN

## 2018-11-14 PROCEDURE — 7100000010 HC PHASE II RECOVERY - FIRST 15 MIN

## 2018-11-14 RX ORDER — ONDANSETRON 4 MG/1
4 TABLET, ORALLY DISINTEGRATING ORAL EVERY 8 HOURS PRN
Status: DISCONTINUED | OUTPATIENT
Start: 2018-11-14 | End: 2018-11-17 | Stop reason: HOSPADM

## 2018-11-14 RX ORDER — METOPROLOL TARTRATE 5 MG/5ML
2.5 INJECTION INTRAVENOUS PRN
Status: ACTIVE | OUTPATIENT
Start: 2018-11-14 | End: 2018-11-14

## 2018-11-14 RX ORDER — SODIUM CHLORIDE 9 MG/ML
INJECTION, SOLUTION INTRAVENOUS CONTINUOUS
Status: DISCONTINUED | OUTPATIENT
Start: 2018-11-14 | End: 2018-11-14 | Stop reason: ALTCHOICE

## 2018-11-14 RX ORDER — SODIUM CHLORIDE 450 MG/100ML
INJECTION, SOLUTION INTRAVENOUS CONTINUOUS
Status: DISCONTINUED | OUTPATIENT
Start: 2018-11-14 | End: 2018-11-17 | Stop reason: HOSPADM

## 2018-11-14 RX ORDER — MORPHINE SULFATE 4 MG/ML
4 INJECTION, SOLUTION INTRAMUSCULAR; INTRAVENOUS
Status: DISCONTINUED | OUTPATIENT
Start: 2018-11-14 | End: 2018-11-17 | Stop reason: HOSPADM

## 2018-11-14 RX ORDER — AMINOPHYLLINE DIHYDRATE 25 MG/ML
100 INJECTION, SOLUTION INTRAVENOUS
Status: ACTIVE | OUTPATIENT
Start: 2018-11-14 | End: 2018-11-14

## 2018-11-14 RX ORDER — ONDANSETRON 2 MG/ML
4 INJECTION INTRAMUSCULAR; INTRAVENOUS EVERY 8 HOURS PRN
Status: DISCONTINUED | OUTPATIENT
Start: 2018-11-14 | End: 2018-11-14 | Stop reason: ALTCHOICE

## 2018-11-14 RX ORDER — NITROGLYCERIN 0.4 MG/1
0.4 TABLET SUBLINGUAL EVERY 5 MIN PRN
Status: ACTIVE | OUTPATIENT
Start: 2018-11-14 | End: 2018-11-14

## 2018-11-14 RX ORDER — AMLODIPINE BESYLATE 5 MG/1
5 TABLET ORAL ONCE
Status: COMPLETED | OUTPATIENT
Start: 2018-11-14 | End: 2018-11-14

## 2018-11-14 RX ORDER — ISOSORBIDE MONONITRATE 60 MG/1
60 TABLET, EXTENDED RELEASE ORAL ONCE
Status: COMPLETED | OUTPATIENT
Start: 2018-11-14 | End: 2018-11-14

## 2018-11-14 RX ORDER — HYDROCODONE BITARTRATE AND ACETAMINOPHEN 5; 325 MG/1; MG/1
1 TABLET ORAL EVERY 4 HOURS PRN
Status: DISCONTINUED | OUTPATIENT
Start: 2018-11-14 | End: 2018-11-17 | Stop reason: HOSPADM

## 2018-11-14 RX ORDER — ONDANSETRON 2 MG/ML
4 INJECTION INTRAMUSCULAR; INTRAVENOUS EVERY 8 HOURS PRN
Status: DISCONTINUED | OUTPATIENT
Start: 2018-11-14 | End: 2018-11-17 | Stop reason: HOSPADM

## 2018-11-14 RX ORDER — MORPHINE SULFATE 2 MG/ML
2 INJECTION, SOLUTION INTRAMUSCULAR; INTRAVENOUS
Status: DISCONTINUED | OUTPATIENT
Start: 2018-11-14 | End: 2018-11-17 | Stop reason: HOSPADM

## 2018-11-14 RX ORDER — HYDROCODONE BITARTRATE AND ACETAMINOPHEN 5; 325 MG/1; MG/1
2 TABLET ORAL EVERY 4 HOURS PRN
Status: DISCONTINUED | OUTPATIENT
Start: 2018-11-14 | End: 2018-11-17 | Stop reason: HOSPADM

## 2018-11-14 RX ORDER — ACETAMINOPHEN 325 MG/1
650 TABLET ORAL EVERY 4 HOURS PRN
Status: DISCONTINUED | OUTPATIENT
Start: 2018-11-14 | End: 2018-11-17 | Stop reason: HOSPADM

## 2018-11-14 RX ORDER — CARVEDILOL 6.25 MG/1
6.25 TABLET ORAL ONCE
Status: COMPLETED | OUTPATIENT
Start: 2018-11-14 | End: 2018-11-14

## 2018-11-14 RX ORDER — SODIUM CHLORIDE 9 MG/ML
INJECTION, SOLUTION INTRAVENOUS ONCE
Status: DISCONTINUED | OUTPATIENT
Start: 2018-11-14 | End: 2018-11-14 | Stop reason: ALTCHOICE

## 2018-11-14 RX ORDER — SODIUM CHLORIDE 450 MG/100ML
INJECTION, SOLUTION INTRAVENOUS CONTINUOUS
Status: DISCONTINUED | OUTPATIENT
Start: 2018-11-14 | End: 2018-11-14 | Stop reason: DRUGHIGH

## 2018-11-14 RX ORDER — SODIUM CHLORIDE 0.9 % (FLUSH) 0.9 %
10 SYRINGE (ML) INJECTION PRN
Status: ACTIVE | OUTPATIENT
Start: 2018-11-14 | End: 2018-11-14

## 2018-11-14 RX ADMIN — AMLODIPINE BESYLATE 5 MG: 5 TABLET ORAL at 14:40

## 2018-11-14 RX ADMIN — ISOSORBIDE MONONITRATE 60 MG: 60 TABLET ORAL at 14:40

## 2018-11-14 RX ADMIN — CARVEDILOL 6.25 MG: 6.25 TABLET, FILM COATED ORAL at 14:40

## 2018-11-14 RX ADMIN — SODIUM CHLORIDE: 4.5 INJECTION, SOLUTION INTRAVENOUS at 09:36

## 2018-11-14 ASSESSMENT — PAIN SCALES - GENERAL
PAINLEVEL_OUTOF10: 0

## 2018-11-14 ASSESSMENT — PAIN - FUNCTIONAL ASSESSMENT: PAIN_FUNCTIONAL_ASSESSMENT: 0-10

## 2018-11-14 NOTE — PRE SEDATION
Sedation Pre-Procedure Note    Patient Name: Fabiola Hennessy   YOB: 1940  Room/Bed: Room/bed info not found  Medical Record Number: [id number]  Date: 11/14/2018   Time: 11:33 AM       Indication:  Mesenteric ischemia    Consent: I have discussed with the patient and/or the patient representative the indication, alternatives, and the possible risks and/or complications of the planned procedure and the anesthesia methods. The patient and/or patient representative appear to understand and agree to proceed. Vital Signs:   Vitals:    11/14/18 1115   BP: (!) 177/61   Pulse: 75   Resp: 10   Temp:    SpO2: 100%       Past Medical History:   has a past medical history of Airway obstruction; Alcoholic polyneuropathy ([address] Angina effort ([address] Anxiety; Atrial fibrillation ([address] CAD (coronary artery disease); Cancer [facility]); CHF (congestive heart failure) ([address] Convulsions ([address] COPD (chronic obstructive pulmonary disease) ([address] Degenerative disc disease; Depression; Diabetes mellitus ([address] Esophageal reflux; Hypertension; Inflammatory spondylopathy ([address] Iron deficiency; Lymphoma ([address] MDRO (multiple drug resistant organisms) resistance; MRSA (methicillin resistant staph aureus) culture positive; Neuropathy; Osteoarthritis; Parkinson's disease ([address] Peripheral vascular disease ([address] Restless leg syndrome; Sleep apnea; Transient cerebral ischemia; and Unsteady gait. Past Surgical History:   has a past surgical history that includes Cholecystectomy; Hysterectomy; Lung removal, partial; Breast lumpectomy; Coronary angioplasty with stent; Femoral-femoral Bypass Graft; bone marrow biopsy (11/29/2017); and other surgical history (11/14/2018).     Medications:   Scheduled Meds:   Continuous Infusions:    sodium chloride      sodium chloride Stopped (11/14/18 0830)    sodium chloride 100 mL/hr at 11/14/18 0936     PRN Meds: aminophylline, metoprolol, nitroGLYCERIN, regadenoson, sodium chloride flush  Home Provider, MD   carbidopa-levodopa (SINEMET)  MG per tablet Take 1 tablet by mouth 4 times daily    Yes Historical Provider, MD   heparin flush 100 UNIT/ML injection 3 mLs by Intercatheter route every 7 days Flush port per protocol 10/25/18   Kirt Denise DO   acetaminophen (TYLENOL) 325 MG tablet Take 650 mg by mouth every 6 hours as needed for Pain    Historical Provider, MD   isosorbide mononitrate (IMDUR) 60 MG CR tablet Take 1 tablet by mouth daily. 3/25/15   Aftab Truong MD   nitroGLYCERIN (NITROSTAT) 0.4 MG SL tablet Place 1 tablet under the tongue every 5 minutes as needed for Chest pain. 12/18/13   Jessica Luis MD     Coumadin Use Last 7 Days:  no  Antiplatelet drug therapy use last 7 days: no  Other anticoagulant use last 7 days: yes  Additional Medication Information:        Pre-Sedation Documentation and Exam:   I have personally completed a history, physical exam & review of systems for this patient (see notes).     Mallampati Airway Assessment:  Mallampati Class II - (soft palate, fauces & uvula are visible)    Prior History of Anesthesia Complications:   none    ASA Classification:  Class 3 - A patient with severe systemic disease that limits activity but is not incapacitating    Sedation/ Anesthesia Plan:   intravenous sedation    Medications Planned:   midazolam (Versed) intravenously and fentanyl intravenously    Patient is an appropriate candidate for plan of sedation: yes    Electronically signed by Roman Marroquin MD on 11/14/2018 at 11:33 AM

## 2018-11-14 NOTE — POST SEDATION
Sedation Post Procedure Note    Patient Name: Gera Crowder   YOB: 1940  Room/Bed: Room/bed info not found  Medical Record Number: [id number]  Date: 11/14/2018   Time: 11:37 AM         Physicians/Assistants: Katt Marin MD    Procedure Performed:  Selective left femoral angiography  Ultrasound-guided access left common femoral artery    Post-Sedation Vital Signs:  Vitals:    11/14/18 1130   BP: (!) 159/46   Pulse: 76   Resp: 11   Temp:    SpO2: 96%      Vital signs were reviewed and were stable after the procedure (see flow sheet for vitals)            Post-Sedation Exam: Lungs: clear to auscultation bilaterally and Cardiovascular: normal           Complications: none    Electronically signed by Katt Marin MD on 11/14/2018 at 11:37 AM

## 2018-11-14 NOTE — BRIEF OP NOTE
Brief Postoperative Note  ______________________________________________________________    Patient: Rosalina Harrison  YOB: 1940  MRN: [id number]  Date of Procedure: 11/14/2018    Pre-Op Diagnosis: Mesenteric ischemia    Post-Op Diagnosis: Same       Procedure: Selective left femoral angiography  Ultrasound-guided access left common femoral artery    Anesthesia: 1% lidocaine    Barb Tian MD    Assistant: None    Estimated Blood Loss (mL): Minimal    Complications: None    Specimens:   * No specimens in log *    Implants:  * No implants in log *      Drains:      Findings: Occluded left iliac system and right common femoral artery    Jordy Dugan MD  Date: 11/14/2018  Time: 11:35 AM

## 2018-11-14 NOTE — H&P
On eliquis, aspirin and clopidogrel     1. Bilateral carotid stenosis with no recent symptomatic clinical features consider ongoing medical management including antiplatelet, ZRH<80 on statins, sbp<150  2. afib on eliquis  3. For medications, would reevaluate anticoagulation and antiplatelet to minimize hemorrhage risk on triple therapy please consider eliquis and single antiplatelet agent such as aspirin if appropriate. 4. followup in neuroendovascular clinic     Majority of 60 minutes spent face to face, counseling, coordinating care, examining patient, reviewing images and labs personally, and speaking with team.           Ruthie Whitten MD  Office [phone]. Cell [phone]  Stroke, [facility]  [id number]         Expand All Collapse All    []Manual[]Template  []Copied       Endovascular Neurosurgery Consult     Pt Name: Gera Crowder  MRN: [id number]  YOB: 1940  Date of evaluation: 10/24/2018  Primary Care Physician: Anmol Carbone DO  Reason for evaluation and chief complain: right ICA stenosis     SUBJECTIVE:   History of Chief Complaint:    Gera Crowder is a 70 y.o. female with PMH of CAD, PVD, CHF, Afib, COPD, parkinson's disease who presented yesterday with chest pain. We were consulted by neurology team for right ICA stenosis. Carotid ultrasound showed right ICA 70-99 % stenosis . Left ICA 50-69 % stenosis . CTA neck showed 70% right ICA stenosis. Patient denies previous stroke. She has bilateral UEs tremors secondary to parkinson's disease and peripheral leg numbness secondary to neuropathy.  Patient currently denies focal weakness/LOC/speech difficulty/vision loss/difficulty swallowing or seizure like activity.      Allergies  is allergic to naprosyn [naproxen]; actonel [risedronate sodium]; albuterol sulfate; atenolol; codeine; cortisone; and requip [ropinirole hcl]. Medications  Home Medications           Prior to Admission medications    Medication Sig Start Date End Date Taking? Authorizing Provider   mirtazapine (REMERON) 15 MG tablet Take 15 mg by mouth nightly       Historical Provider, MD   HYDROcodone-acetaminophen (NORCO) 5-325 MG per tablet Take 1 tablet by mouth every 6 hours as needed for Pain. .       Historical Provider, MD   apixaban (ELIQUIS) 5 MG TABS tablet Take 1 tablet by mouth 2 times daily 8/28/18     Chinedu Jhaveri MD   clopidogrel (PLAVIX) 75 MG tablet Take 1 tablet by mouth daily 8/29/18     Chinedu Jhaveri MD   mometasone-formoterol (DULERA) 200-5 MCG/ACT inhaler Inhale 2 puffs into the lungs 2 times daily       Historical Provider, MD   pantoprazole (PROTONIX) 40 MG tablet Take 40 mg by mouth daily       Historical Provider, MD   amLODIPine (NORVASC) 5 MG tablet Take 5 mg by mouth daily       Historical Provider, MD   levothyroxine (SYNTHROID) 25 MCG tablet Take 25 mcg by mouth Daily       Historical Provider, MD   polyethylene glycol (MIRALAX) powder Take 17 g by mouth daily as needed        Historical Provider, MD   DULOXETINE HCL PO Take 90 mg by mouth daily Takes 30mg + 60mg caps (=90mg) once daily       Historical Provider, MD   acetaminophen (TYLENOL) 325 MG tablet Take 650 mg by mouth every 6 hours as needed for Pain       Historical Provider, MD   ALPRAZolam (XANAX) 0.5 MG tablet Take 0.5 mg by mouth 3 times daily as needed for Anxiety.       Historical Provider, MD   budesonide (PULMICORT) 0.5 MG/2ML nebulizer suspension Take 1 ampule by nebulization every 12 hours as needed (COPD)       Historical Provider, MD   ipratropium (ATROVENT) 0.02 % nebulizer solution Take 0.5 mg by nebulization 4 times daily as needed (wheezing or SOB)       Historical Provider, MD   aspirin 81 MG chewable tablet Take 81 mg by mouth daily       Historical Provider, MD   divalproex (DEPAKOTE) 125 MG DR tablet Take 125 mg by mouth 3 times daily

## 2018-11-19 ENCOUNTER — HOSPITAL ENCOUNTER (OUTPATIENT)
Age: 78
Setting detail: SPECIMEN
Discharge: HOME | End: 2018-11-19
Payer: MEDICARE

## 2018-11-19 LAB
-: ABNORMAL
AMORPHOUS: ABNORMAL
BACTERIA: ABNORMAL
BILIRUBIN URINE: NEGATIVE
CASTS UA: ABNORMAL /LPF (ref 0–8)
COLOR: YELLOW
COMMENT UA: ABNORMAL
CRYSTALS, UA: ABNORMAL /HPF
EPITHELIAL CELLS UA: ABNORMAL /HPF (ref 0–5)
GLUCOSE URINE: NEGATIVE
KETONES, URINE: NEGATIVE
LEUKOCYTE ESTERASE, URINE: ABNORMAL
MUCUS: ABNORMAL
NITRITE, URINE: NEGATIVE
OTHER OBSERVATIONS UA: ABNORMAL
PH UA: 6 (ref 5–8)
PROTEIN UA: NEGATIVE
RBC UA: ABNORMAL /HPF (ref 0–4)
RENAL EPITHELIAL, UA: ABNORMAL /HPF
SPECIFIC GRAVITY UA: 1.02 (ref 1–1.03)
TRICHOMONAS: ABNORMAL
TURBIDITY: ABNORMAL
URINE HGB: ABNORMAL
UROBILINOGEN, URINE: NORMAL
WBC UA: ABNORMAL /HPF (ref 0–5)
YEAST: ABNORMAL

## 2018-11-19 PROCEDURE — 87086 URINE CULTURE/COLONY COUNT: CPT

## 2018-11-19 PROCEDURE — 87186 SC STD MICRODIL/AGAR DIL: CPT

## 2018-11-19 PROCEDURE — 81001 URINALYSIS AUTO W/SCOPE: CPT

## 2018-11-19 PROCEDURE — 87088 URINE BACTERIA CULTURE: CPT

## 2018-11-20 LAB
CULTURE: ABNORMAL
Lab: ABNORMAL
ORGANISM: ABNORMAL
SPECIMEN DESCRIPTION: ABNORMAL
STATUS: ABNORMAL

## 2018-11-28 ENCOUNTER — OFFICE VISIT (OUTPATIENT)
Dept: PALLATIVE CARE | Age: 78
End: 2018-11-28
Payer: MEDICARE

## 2018-11-28 VITALS
BODY MASS INDEX: 23.39 KG/M2 | SYSTOLIC BLOOD PRESSURE: 104 MMHG | RESPIRATION RATE: 20 BRPM | HEIGHT: 59 IN | DIASTOLIC BLOOD PRESSURE: 54 MMHG | TEMPERATURE: 98 F | WEIGHT: 116 LBS | HEART RATE: 97 BPM

## 2018-11-28 DIAGNOSIS — K55.1 MESENTERIC ARTERY STENOSIS (HCC): ICD-10-CM

## 2018-11-28 DIAGNOSIS — Z71.89 GOALS OF CARE, COUNSELING/DISCUSSION: ICD-10-CM

## 2018-11-28 DIAGNOSIS — R29.6 FREQUENT FALLS: ICD-10-CM

## 2018-11-28 DIAGNOSIS — G20 PARKINSON'S DISEASE (HCC): Primary | ICD-10-CM

## 2018-11-28 DIAGNOSIS — N39.0 ACUTE LOWER UTI (URINARY TRACT INFECTION): ICD-10-CM

## 2018-11-28 PROCEDURE — G8428 CUR MEDS NOT DOCUMENT: HCPCS | Performed by: INTERNAL MEDICINE

## 2018-11-28 PROCEDURE — 1101F PT FALLS ASSESS-DOCD LE1/YR: CPT | Performed by: INTERNAL MEDICINE

## 2018-11-28 PROCEDURE — 99203 OFFICE O/P NEW LOW 30 MIN: CPT | Performed by: INTERNAL MEDICINE

## 2018-11-28 PROCEDURE — G8400 PT W/DXA NO RESULTS DOC: HCPCS | Performed by: INTERNAL MEDICINE

## 2018-11-28 PROCEDURE — 1123F ACP DISCUSS/DSCN MKR DOCD: CPT | Performed by: INTERNAL MEDICINE

## 2018-11-28 PROCEDURE — 1090F PRES/ABSN URINE INCON ASSESS: CPT | Performed by: INTERNAL MEDICINE

## 2018-11-28 PROCEDURE — G8484 FLU IMMUNIZE NO ADMIN: HCPCS | Performed by: INTERNAL MEDICINE

## 2018-11-28 PROCEDURE — 4004F PT TOBACCO SCREEN RCVD TLK: CPT | Performed by: INTERNAL MEDICINE

## 2018-11-28 PROCEDURE — 4040F PNEUMOC VAC/ADMIN/RCVD: CPT | Performed by: INTERNAL MEDICINE

## 2018-11-28 PROCEDURE — G8420 CALC BMI NORM PARAMETERS: HCPCS | Performed by: INTERNAL MEDICINE

## 2018-11-28 PROCEDURE — G8598 ASA/ANTIPLAT THER USED: HCPCS | Performed by: INTERNAL MEDICINE

## 2018-11-28 RX ORDER — PHENAZOPYRIDINE HYDROCHLORIDE 100 MG/1
200 TABLET, FILM COATED ORAL 3 TIMES DAILY PRN
Qty: 6 TABLET | Refills: 0 | Status: SHIPPED | OUTPATIENT
Start: 2018-11-28 | End: 2018-11-30

## 2018-11-28 ASSESSMENT — ENCOUNTER SYMPTOMS
RHINORRHEA: 1
PHOTOPHOBIA: 0
SINUS PRESSURE: 1
SINUS PAIN: 0
VOMITING: 0
EYE PAIN: 0
SORE THROAT: 0
WHEEZING: 0
COUGH: 1
ABDOMINAL PAIN: 0
DIARRHEA: 1
NAUSEA: 0
SHORTNESS OF BREATH: 1
BLOOD IN STOOL: 1

## 2018-11-28 NOTE — PROGRESS NOTES
Palliative Care  Consultation Note    Patient: Dorene Tuttle  1940      Referring physician: No att. providers found  Consultingphysician: Surekha Razo MD      REASON FOR CONSULTATION:   Assist in symptomand pain control   Goals of care evaluation  Distress management  Facilitate communications  Non-pain symptoms:  Recommendations for theabove    HISTORY OF PRESENT ILLNESS:   Dorene Tuttle is a 70 y.o. female with a history of coronary artery disease with stent placement, CHF, COPD, A. 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 Breast Ca 1993 s/p chemo. NSCLC s/p resection. MALT Lymphoma Lung subsequesnt. PET scan May 2018: Avid Lung nodules. 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 and follows withWilliam Swan DO . Palliative Care was consulted to help manage symptoms, facilitate communications and establish goals of care. Still having falls as a consequence of progressing tremors following with neurologist. Appointment 12/4/18. Burning with urination. On abx for UTI started 2 days ago. Blood streaked napkin with wiping. Diarrhea resolved after using immodium    Merrit Amonate    Outpatient oncologist Dr. Maria Guadalupe Hughes    Smoking every other day. States she had Colonoscopy 2017      SUBJECTIVE:       has a past medical history of Airway obstruction; Alcoholic polyneuropathy ([address] Angina effort ([address] Anxiety; Atrial fibrillation ([address] CAD (coronary artery disease); Cancer [facility]); CHF (congestive heart failure) ([address] Convulsions ([address] COPD (chronic obstructive pulmonary disease) ([address] Degenerative disc disease; Depression; Diabetes mellitus ([address] Esophageal reflux; Hypertension; Inflammatory spondylopathy ([address]  Iron deficiency; Lymphoma ([address] MDRO (multiple drug resistant organisms) resistance; MRSA (methicillin resistant staph aureus) culture positive; Neuropathy; Osteoarthritis; Parkinson's disease ([address] directives: not asked   Family agrees with advance directives: not asked    Review of Systems:  Review of Systems   Constitutional: Positive for fatigue. Negative for chills and fever. HENT: Positive for congestion, rhinorrhea and sinus pressure. Negative for sinus pain and sore throat. Eyes: Negative for photophobia, pain and visual disturbance. Respiratory: Positive for cough and shortness of breath. Negative for wheezing. Cardiovascular: Negative for chest pain and palpitations. Gastrointestinal: Positive for blood in stool and diarrhea. Negative for abdominal pain, nausea and vomiting. Genitourinary: Positive for dysuria and flank pain. Musculoskeletal: Positive for gait problem. Skin: Negative for rash. Neurological: Positive for tremors. Falls   Psychiatric/Behavioral: Positive for hallucinations (associated with ilness). Physical Exam:  BP (!) 104/54 (Site: Right Upper Arm, Position: Sitting)   Pulse 97   Temp 98 °F (36.7 °C) (Oral)   Resp 20   Ht 4' 11\" (1.499 m)   Wt 116 lb (52.6 kg)   BMI 23.43 kg/m²     Physical Exam   Constitutional: She is oriented to person, place, and time. She appears well-developed and well-nourished. HENT:   Head: Normocephalic and atraumatic. Eyes: Pupils are equal, round, and reactive to light. Neck: Normal range of motion. Cardiovascular: Normal rate. No murmur heard. Pulmonary/Chest: Effort normal.   Diminished breath sounds at lung bases   Abdominal: Soft. There is tenderness (suprapubic). Musculoskeletal: Normal range of motion. She exhibits no edema. No spasticity   Neurological: She is alert and oriented to person, place, and time. Tremor at rest worsened with intention   Skin: Capillary refill takes less than 2 seconds. Psychiatric: She has a normal mood and affect.          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    Splenomegaly    Generalized abdominal pain    Mesenteric artery stenosis (HCC)    Chest pain radiating to upper extremity    Thrombocytopenia (HCC)    Hypocalcemia    Elevated brain natriuretic peptide (BNP) level    Alcoholic polyneuropathy (HCC)    Atrial fibrillation (HCC)    Cancer (HCC)    Convulsions (HCC)    Diabetes mellitus ([facility]    Sleep apnea    Falling episodes    Stenosis of right vertebral artery    Deep vein thrombosis (DVT) of distal vein of right lower extremity ([facility]     11/14/18:  OPERATIONS PERFORMED:  1. Selective left femoral angiography. 2.  Ultrasound-guided access, left common femoral artery.     SURGEON:  Jessica Asher. Sonia Mae MD      IMPRESSION:  1. Occluded iliac systems bilaterally along with the right common  femoral artery. 2.  Patent left femoral bifurcation.              Follow-up to prior meeting  Disease progression  Discuss goals of care  Accomplish end of life planning    3- Code Status Full      Other recommendations:    1. Parkinson's disease [facility])  - Has upcoming appointment with neurology December 4th  - We will await neurology recs  - We need patient to bring medications next visit for possible worsening tremors due to medication side effects. 2. Acute lower UTI (urinary tract infection)  - complete Abx  - phenazopyridine (PYRIDIUM) 100 MG tablet; Take 2 tablets by mouth 3 times daily as needed for Pain  Dispense: 6 tablet; Refill: 0    3. Frequent falls  - Possibly attributed to worsening tremor    4. Mesenteric artery stenosis (HCC)  - Vascular disease  - Following with vascular surgery    5.  Goals of care, counseling/discussion  - Patient needs to bring in her Living will and POA paperwork            Electronically signed by   Lucinda Gorman MD  on 11/28/2018 at 11:43 AM    [facility]

## 2018-11-28 NOTE — PATIENT INSTRUCTIONS
Need to reschedule appointment with Oncologist (Dr. Kate Lipscomb). Need to bring copy of Living will and medications to next visit. Prescription filled for Pyridium. Patient needs to follow up with vascular surgeon for potential blood in stool. Check stool for occult blood and repeat cbc.

## 2018-12-04 ENCOUNTER — OFFICE VISIT (OUTPATIENT)
Dept: NEUROLOGY | Age: 78
End: 2018-12-04
Payer: MEDICARE

## 2018-12-04 VITALS — SYSTOLIC BLOOD PRESSURE: 111 MMHG | HEART RATE: 78 BPM | DIASTOLIC BLOOD PRESSURE: 62 MMHG

## 2018-12-04 DIAGNOSIS — G62.1 ALCOHOLIC PERIPHERAL NEUROPATHY (HCC): ICD-10-CM

## 2018-12-04 DIAGNOSIS — G20 PARKINSON'S DISEASE (HCC): Primary | ICD-10-CM

## 2018-12-04 DIAGNOSIS — I65.21 STENOSIS OF RIGHT CAROTID ARTERY: ICD-10-CM

## 2018-12-04 PROCEDURE — 1090F PRES/ABSN URINE INCON ASSESS: CPT | Performed by: NURSE PRACTITIONER

## 2018-12-04 PROCEDURE — 1101F PT FALLS ASSESS-DOCD LE1/YR: CPT | Performed by: NURSE PRACTITIONER

## 2018-12-04 PROCEDURE — 4040F PNEUMOC VAC/ADMIN/RCVD: CPT | Performed by: NURSE PRACTITIONER

## 2018-12-04 PROCEDURE — G8427 DOCREV CUR MEDS BY ELIG CLIN: HCPCS | Performed by: NURSE PRACTITIONER

## 2018-12-04 PROCEDURE — G8420 CALC BMI NORM PARAMETERS: HCPCS | Performed by: NURSE PRACTITIONER

## 2018-12-04 PROCEDURE — 4004F PT TOBACCO SCREEN RCVD TLK: CPT | Performed by: NURSE PRACTITIONER

## 2018-12-04 PROCEDURE — 99214 OFFICE O/P EST MOD 30 MIN: CPT | Performed by: NURSE PRACTITIONER

## 2018-12-04 PROCEDURE — 1123F ACP DISCUSS/DSCN MKR DOCD: CPT | Performed by: NURSE PRACTITIONER

## 2018-12-04 PROCEDURE — G8598 ASA/ANTIPLAT THER USED: HCPCS | Performed by: NURSE PRACTITIONER

## 2018-12-04 PROCEDURE — G8484 FLU IMMUNIZE NO ADMIN: HCPCS | Performed by: NURSE PRACTITIONER

## 2018-12-04 PROCEDURE — G8400 PT W/DXA NO RESULTS DOC: HCPCS | Performed by: NURSE PRACTITIONER

## 2018-12-05 ENCOUNTER — HOSPITAL ENCOUNTER (OUTPATIENT)
Age: 78
Setting detail: SPECIMEN
Discharge: HOME | End: 2018-12-05
Payer: MEDICARE

## 2018-12-05 LAB
ABSOLUTE EOS #: 0.13 K/UL (ref 0–0.44)
ABSOLUTE IMMATURE GRANULOCYTE: 0.02 K/UL (ref 0–0.3)
ABSOLUTE LYMPH #: 0.88 K/UL (ref 1.1–3.7)
ABSOLUTE MONO #: 0.29 K/UL (ref 0.1–1.2)
ALBUMIN SERPL-MCNC: 3 G/DL (ref 3.5–5.2)
ALBUMIN/GLOBULIN RATIO: 1 (ref 1–2.5)
ALP BLD-CCNC: 47 U/L (ref 35–104)
ALT SERPL-CCNC: <5 U/L (ref 5–33)
ANION GAP SERPL CALCULATED.3IONS-SCNC: 7 MMOL/L (ref 9–17)
AST SERPL-CCNC: 14 U/L
BASOPHILS # BLD: 1 % (ref 0–2)
BASOPHILS ABSOLUTE: 0.02 K/UL (ref 0–0.2)
BILIRUB SERPL-MCNC: 0.27 MG/DL (ref 0.3–1.2)
BUN BLDV-MCNC: 20 MG/DL (ref 8–23)
BUN/CREAT BLD: ABNORMAL (ref 9–20)
CALCIUM SERPL-MCNC: 8.1 MG/DL (ref 8.6–10.4)
CHLORIDE BLD-SCNC: 106 MMOL/L (ref 98–107)
CO2: 31 MMOL/L (ref 20–31)
CREAT SERPL-MCNC: 0.82 MG/DL (ref 0.5–0.9)
DIFFERENTIAL TYPE: ABNORMAL
EOSINOPHILS RELATIVE PERCENT: 6 % (ref 1–4)
GFR AFRICAN AMERICAN: >60 ML/MIN
GFR NON-AFRICAN AMERICAN: >60 ML/MIN
GFR SERPL CREATININE-BSD FRML MDRD: ABNORMAL ML/MIN/{1.73_M2}
GFR SERPL CREATININE-BSD FRML MDRD: ABNORMAL ML/MIN/{1.73_M2}
GLUCOSE BLD-MCNC: 87 MG/DL (ref 70–99)
HCT VFR BLD CALC: 29.1 % (ref 36.3–47.1)
HEMOGLOBIN: 9 G/DL (ref 11.9–15.1)
IMMATURE GRANULOCYTES: 1 %
LYMPHOCYTES # BLD: 40 % (ref 24–43)
MCH RBC QN AUTO: 32.5 PG (ref 25.2–33.5)
MCHC RBC AUTO-ENTMCNC: 30.9 G/DL (ref 28.4–34.8)
MCV RBC AUTO: 105.1 FL (ref 82.6–102.9)
MONOCYTES # BLD: 13 % (ref 3–12)
MORPHOLOGY: ABNORMAL
NRBC AUTOMATED: 0 PER 100 WBC
PDW BLD-RTO: 14.8 % (ref 11.8–14.4)
PLATELET # BLD: 86 K/UL (ref 138–453)
PLATELET ESTIMATE: ABNORMAL
PMV BLD AUTO: 10.6 FL (ref 8.1–13.5)
POTASSIUM SERPL-SCNC: 4.3 MMOL/L (ref 3.7–5.3)
RBC # BLD: 2.77 M/UL (ref 3.95–5.11)
RBC # BLD: ABNORMAL 10*6/UL
SEG NEUTROPHILS: 39 % (ref 36–65)
SEGMENTED NEUTROPHILS ABSOLUTE COUNT: 0.86 K/UL (ref 1.5–8.1)
SODIUM BLD-SCNC: 144 MMOL/L (ref 135–144)
TOTAL PROTEIN: 6.1 G/DL (ref 6.4–8.3)
WBC # BLD: 2.2 K/UL (ref 3.5–11.3)
WBC # BLD: ABNORMAL 10*3/UL

## 2018-12-05 PROCEDURE — P9603 ONE-WAY ALLOW PRORATED MILES: HCPCS

## 2018-12-05 PROCEDURE — 36415 COLL VENOUS BLD VENIPUNCTURE: CPT

## 2018-12-05 PROCEDURE — 85025 COMPLETE CBC W/AUTO DIFF WBC: CPT

## 2018-12-05 PROCEDURE — 80053 COMPREHEN METABOLIC PANEL: CPT

## 2018-12-11 ENCOUNTER — HOSPITAL ENCOUNTER (OUTPATIENT)
Age: 78
Setting detail: SPECIMEN
Discharge: HOME | End: 2018-12-11
Payer: MEDICARE

## 2018-12-11 LAB
ANION GAP SERPL CALCULATED.3IONS-SCNC: 9 MMOL/L (ref 9–17)
BUN BLDV-MCNC: 15 MG/DL (ref 8–23)
BUN/CREAT BLD: 21 (ref 9–20)
CALCIUM SERPL-MCNC: 8.5 MG/DL (ref 8.6–10.4)
CHLORIDE BLD-SCNC: 102 MMOL/L (ref 98–107)
CO2: 33 MMOL/L (ref 20–31)
CREAT SERPL-MCNC: 0.7 MG/DL (ref 0.5–0.9)
GFR AFRICAN AMERICAN: >60 ML/MIN
GFR NON-AFRICAN AMERICAN: >60 ML/MIN
GFR SERPL CREATININE-BSD FRML MDRD: ABNORMAL ML/MIN/{1.73_M2}
GFR SERPL CREATININE-BSD FRML MDRD: ABNORMAL ML/MIN/{1.73_M2}
GLUCOSE BLD-MCNC: 81 MG/DL (ref 70–99)
HCT VFR BLD CALC: 25.1 % (ref 36–46)
HEMOGLOBIN: 8.8 G/DL (ref 12–16)
MCH RBC QN AUTO: 33.8 PG (ref 26–34)
MCHC RBC AUTO-ENTMCNC: 35.1 G/DL (ref 31–37)
MCV RBC AUTO: 96.1 FL (ref 80–100)
NRBC AUTOMATED: ABNORMAL PER 100 WBC
PDW BLD-RTO: 15.4 % (ref 11.5–14.5)
PLATELET # BLD: 101 K/UL (ref 130–400)
PMV BLD AUTO: 7.8 FL (ref 6–12)
POTASSIUM SERPL-SCNC: 4.5 MMOL/L (ref 3.7–5.3)
RBC # BLD: 2.61 M/UL (ref 4–5.2)
SODIUM BLD-SCNC: 144 MMOL/L (ref 135–144)
WBC # BLD: 2.5 K/UL (ref 3.5–11)

## 2018-12-11 PROCEDURE — 36415 COLL VENOUS BLD VENIPUNCTURE: CPT

## 2018-12-11 PROCEDURE — 85027 COMPLETE CBC AUTOMATED: CPT

## 2018-12-11 PROCEDURE — 80048 BASIC METABOLIC PNL TOTAL CA: CPT

## 2018-12-18 ENCOUNTER — HOSPITAL ENCOUNTER (OUTPATIENT)
Age: 78
Setting detail: SPECIMEN
Discharge: HOME | End: 2018-12-18
Payer: MEDICARE

## 2018-12-18 LAB
ANION GAP SERPL CALCULATED.3IONS-SCNC: 11 MMOL/L (ref 9–17)
BUN BLDV-MCNC: 15 MG/DL (ref 8–23)
BUN/CREAT BLD: ABNORMAL (ref 9–20)
CALCIUM SERPL-MCNC: 8.4 MG/DL (ref 8.6–10.4)
CHLORIDE BLD-SCNC: 102 MMOL/L (ref 98–107)
CO2: 29 MMOL/L (ref 20–31)
CREAT SERPL-MCNC: 0.87 MG/DL (ref 0.5–0.9)
GFR AFRICAN AMERICAN: >60 ML/MIN
GFR NON-AFRICAN AMERICAN: >60 ML/MIN
GFR SERPL CREATININE-BSD FRML MDRD: ABNORMAL ML/MIN/{1.73_M2}
GFR SERPL CREATININE-BSD FRML MDRD: ABNORMAL ML/MIN/{1.73_M2}
GLUCOSE BLD-MCNC: 74 MG/DL (ref 70–99)
HCT VFR BLD CALC: 27.7 % (ref 36.3–47.1)
HEMOGLOBIN: 8.1 G/DL (ref 11.9–15.1)
MCH RBC QN AUTO: 32.1 PG (ref 25.2–33.5)
MCHC RBC AUTO-ENTMCNC: 29.2 G/DL (ref 28.4–34.8)
MCV RBC AUTO: 109.9 FL (ref 82.6–102.9)
NRBC AUTOMATED: 0 PER 100 WBC
PDW BLD-RTO: 16.7 % (ref 11.8–14.4)
PLATELET # BLD: 127 K/UL (ref 138–453)
PMV BLD AUTO: 11.2 FL (ref 8.1–13.5)
POTASSIUM SERPL-SCNC: 4 MMOL/L (ref 3.7–5.3)
RBC # BLD: 2.52 M/UL (ref 3.95–5.11)
SODIUM BLD-SCNC: 142 MMOL/L (ref 135–144)
WBC # BLD: 3.8 K/UL (ref 3.5–11.3)

## 2018-12-18 PROCEDURE — 85027 COMPLETE CBC AUTOMATED: CPT

## 2018-12-18 PROCEDURE — 36415 COLL VENOUS BLD VENIPUNCTURE: CPT

## 2018-12-18 PROCEDURE — 80048 BASIC METABOLIC PNL TOTAL CA: CPT

## 2018-12-18 PROCEDURE — P9603 ONE-WAY ALLOW PRORATED MILES: HCPCS

## 2018-12-24 ENCOUNTER — HOSPITAL ENCOUNTER (OUTPATIENT)
Age: 78
Setting detail: SPECIMEN
Discharge: HOME | End: 2018-12-24
Payer: MEDICARE

## 2018-12-24 LAB
ANION GAP SERPL CALCULATED.3IONS-SCNC: 16 MMOL/L (ref 9–17)
BUN BLDV-MCNC: 21 MG/DL (ref 8–23)
BUN/CREAT BLD: ABNORMAL (ref 9–20)
CALCIUM SERPL-MCNC: 8.5 MG/DL (ref 8.6–10.4)
CHLORIDE BLD-SCNC: 103 MMOL/L (ref 98–107)
CO2: 25 MMOL/L (ref 20–31)
CREAT SERPL-MCNC: 1.02 MG/DL (ref 0.5–0.9)
GFR AFRICAN AMERICAN: >60 ML/MIN
GFR NON-AFRICAN AMERICAN: 53 ML/MIN
GFR SERPL CREATININE-BSD FRML MDRD: ABNORMAL ML/MIN/{1.73_M2}
GFR SERPL CREATININE-BSD FRML MDRD: ABNORMAL ML/MIN/{1.73_M2}
GLUCOSE BLD-MCNC: 97 MG/DL (ref 70–99)
HCT VFR BLD CALC: 27.4 % (ref 36.3–47.1)
HEMOGLOBIN: 8 G/DL (ref 11.9–15.1)
MCH RBC QN AUTO: 32.8 PG (ref 25.2–33.5)
MCHC RBC AUTO-ENTMCNC: 29.2 G/DL (ref 28.4–34.8)
MCV RBC AUTO: 112.3 FL (ref 82.6–102.9)
NRBC AUTOMATED: 1.3 PER 100 WBC
PDW BLD-RTO: 17.5 % (ref 11.8–14.4)
PLATELET # BLD: 137 K/UL (ref 138–453)
PMV BLD AUTO: 10.9 FL (ref 8.1–13.5)
POTASSIUM SERPL-SCNC: 5.3 MMOL/L (ref 3.7–5.3)
RBC # BLD: 2.44 M/UL (ref 3.95–5.11)
SODIUM BLD-SCNC: 144 MMOL/L (ref 135–144)
WBC # BLD: 4.8 K/UL (ref 3.5–11.3)

## 2018-12-24 PROCEDURE — P9603 ONE-WAY ALLOW PRORATED MILES: HCPCS

## 2018-12-24 PROCEDURE — 85027 COMPLETE CBC AUTOMATED: CPT

## 2018-12-24 PROCEDURE — 80048 BASIC METABOLIC PNL TOTAL CA: CPT

## 2018-12-24 PROCEDURE — 36415 COLL VENOUS BLD VENIPUNCTURE: CPT

## 2019-01-02 ENCOUNTER — HOSPITAL ENCOUNTER (OUTPATIENT)
Age: 79
Setting detail: SPECIMEN
Discharge: HOME | End: 2019-01-02
Payer: MEDICARE

## 2019-01-02 LAB
ANION GAP SERPL CALCULATED.3IONS-SCNC: 12 MMOL/L (ref 9–17)
BUN BLDV-MCNC: 14 MG/DL (ref 8–23)
BUN/CREAT BLD: ABNORMAL (ref 9–20)
CALCIUM SERPL-MCNC: 8 MG/DL (ref 8.6–10.4)
CHLORIDE BLD-SCNC: 106 MMOL/L (ref 98–107)
CO2: 27 MMOL/L (ref 20–31)
CREAT SERPL-MCNC: 0.85 MG/DL (ref 0.5–0.9)
GFR AFRICAN AMERICAN: >60 ML/MIN
GFR NON-AFRICAN AMERICAN: >60 ML/MIN
GFR SERPL CREATININE-BSD FRML MDRD: ABNORMAL ML/MIN/{1.73_M2}
GFR SERPL CREATININE-BSD FRML MDRD: ABNORMAL ML/MIN/{1.73_M2}
GLUCOSE BLD-MCNC: 93 MG/DL (ref 70–99)
HCT VFR BLD CALC: 27.3 % (ref 36.3–47.1)
HEMOGLOBIN: 7.8 G/DL (ref 11.9–15.1)
MCH RBC QN AUTO: 32.9 PG (ref 25.2–33.5)
MCHC RBC AUTO-ENTMCNC: 28.6 G/DL (ref 28.4–34.8)
MCV RBC AUTO: 115.2 FL (ref 82.6–102.9)
NRBC AUTOMATED: 0 PER 100 WBC
PDW BLD-RTO: 18.6 % (ref 11.8–14.4)
PLATELET # BLD: 129 K/UL (ref 138–453)
PMV BLD AUTO: 11.1 FL (ref 8.1–13.5)
POTASSIUM SERPL-SCNC: 4.5 MMOL/L (ref 3.7–5.3)
RBC # BLD: 2.37 M/UL (ref 3.95–5.11)
SODIUM BLD-SCNC: 145 MMOL/L (ref 135–144)
WBC # BLD: 2.7 K/UL (ref 3.5–11.3)

## 2019-01-02 PROCEDURE — 80048 BASIC METABOLIC PNL TOTAL CA: CPT

## 2019-01-02 PROCEDURE — 85027 COMPLETE CBC AUTOMATED: CPT

## 2019-01-02 PROCEDURE — P9603 ONE-WAY ALLOW PRORATED MILES: HCPCS

## 2019-01-02 PROCEDURE — 36415 COLL VENOUS BLD VENIPUNCTURE: CPT

## 2019-01-04 ENCOUNTER — HOSPITAL ENCOUNTER (OUTPATIENT)
Age: 79
Setting detail: SPECIMEN
Discharge: HOME | End: 2019-01-04
Payer: MEDICARE

## 2019-01-04 LAB
ABSOLUTE EOS #: 0.09 K/UL (ref 0–0.44)
ABSOLUTE IMMATURE GRANULOCYTE: 0.02 K/UL (ref 0–0.3)
ABSOLUTE LYMPH #: 0.79 K/UL (ref 1.1–3.7)
ABSOLUTE MONO #: 0.24 K/UL (ref 0.1–1.2)
ANION GAP SERPL CALCULATED.3IONS-SCNC: 9 MMOL/L (ref 9–17)
BASOPHILS # BLD: 1 % (ref 0–2)
BASOPHILS ABSOLUTE: 0.02 K/UL (ref 0–0.2)
BUN BLDV-MCNC: 14 MG/DL (ref 8–23)
BUN/CREAT BLD: ABNORMAL (ref 9–20)
CALCIUM SERPL-MCNC: 7.9 MG/DL (ref 8.6–10.4)
CHLORIDE BLD-SCNC: 109 MMOL/L (ref 98–107)
CO2: 28 MMOL/L (ref 20–31)
CREAT SERPL-MCNC: 0.73 MG/DL (ref 0.5–0.9)
DIFFERENTIAL TYPE: ABNORMAL
EOSINOPHILS RELATIVE PERCENT: 4 % (ref 1–4)
GFR AFRICAN AMERICAN: >60 ML/MIN
GFR NON-AFRICAN AMERICAN: >60 ML/MIN
GFR SERPL CREATININE-BSD FRML MDRD: ABNORMAL ML/MIN/{1.73_M2}
GFR SERPL CREATININE-BSD FRML MDRD: ABNORMAL ML/MIN/{1.73_M2}
GLUCOSE BLD-MCNC: 78 MG/DL (ref 70–99)
HCT VFR BLD CALC: 24.3 % (ref 36.3–47.1)
HEMOGLOBIN: 7.1 G/DL (ref 11.9–15.1)
IMMATURE GRANULOCYTES: 1 %
LYMPHOCYTES # BLD: 36 % (ref 24–43)
MCH RBC QN AUTO: 32.9 PG (ref 25.2–33.5)
MCHC RBC AUTO-ENTMCNC: 29.2 G/DL (ref 28.4–34.8)
MCV RBC AUTO: 112.5 FL (ref 82.6–102.9)
MONOCYTES # BLD: 11 % (ref 3–12)
MORPHOLOGY: ABNORMAL
NRBC AUTOMATED: 0 PER 100 WBC
PDW BLD-RTO: 18.3 % (ref 11.8–14.4)
PLATELET # BLD: 126 K/UL (ref 138–453)
PLATELET ESTIMATE: ABNORMAL
PMV BLD AUTO: 11.5 FL (ref 8.1–13.5)
POTASSIUM SERPL-SCNC: 3.3 MMOL/L (ref 3.7–5.3)
RBC # BLD: 2.16 M/UL (ref 3.95–5.11)
RBC # BLD: ABNORMAL 10*6/UL
SEG NEUTROPHILS: 47 % (ref 36–65)
SEGMENTED NEUTROPHILS ABSOLUTE COUNT: 1.04 K/UL (ref 1.5–8.1)
SODIUM BLD-SCNC: 146 MMOL/L (ref 135–144)
WBC # BLD: 2.2 K/UL (ref 3.5–11.3)
WBC # BLD: ABNORMAL 10*3/UL

## 2019-01-04 PROCEDURE — 80048 BASIC METABOLIC PNL TOTAL CA: CPT

## 2019-01-04 PROCEDURE — 85025 COMPLETE CBC W/AUTO DIFF WBC: CPT

## 2019-01-04 PROCEDURE — 36415 COLL VENOUS BLD VENIPUNCTURE: CPT

## 2019-01-04 PROCEDURE — P9603 ONE-WAY ALLOW PRORATED MILES: HCPCS

## 2019-01-08 ENCOUNTER — HOSPITAL ENCOUNTER (OUTPATIENT)
Age: 79
Setting detail: SPECIMEN
Discharge: HOME | End: 2019-01-08
Payer: MEDICARE

## 2019-01-08 LAB
ANION GAP SERPL CALCULATED.3IONS-SCNC: 11 MMOL/L (ref 9–17)
BUN BLDV-MCNC: 15 MG/DL (ref 8–23)
BUN/CREAT BLD: ABNORMAL (ref 9–20)
CALCIUM SERPL-MCNC: 7.7 MG/DL (ref 8.6–10.4)
CHLORIDE BLD-SCNC: 108 MMOL/L (ref 98–107)
CO2: 27 MMOL/L (ref 20–31)
CREAT SERPL-MCNC: 0.98 MG/DL (ref 0.5–0.9)
GFR AFRICAN AMERICAN: >60 ML/MIN
GFR NON-AFRICAN AMERICAN: 55 ML/MIN
GFR SERPL CREATININE-BSD FRML MDRD: ABNORMAL ML/MIN/{1.73_M2}
GFR SERPL CREATININE-BSD FRML MDRD: ABNORMAL ML/MIN/{1.73_M2}
GLUCOSE BLD-MCNC: 79 MG/DL (ref 70–99)
HCT VFR BLD CALC: 25.1 % (ref 36.3–47.1)
HEMOGLOBIN: 7.4 G/DL (ref 11.9–15.1)
MCH RBC QN AUTO: 33 PG (ref 25.2–33.5)
MCHC RBC AUTO-ENTMCNC: 29.5 G/DL (ref 28.4–34.8)
MCV RBC AUTO: 112.1 FL (ref 82.6–102.9)
NRBC AUTOMATED: 0 PER 100 WBC
PDW BLD-RTO: 18.1 % (ref 11.8–14.4)
PLATELET # BLD: 119 K/UL (ref 138–453)
PMV BLD AUTO: 11.4 FL (ref 8.1–13.5)
POTASSIUM SERPL-SCNC: 4.5 MMOL/L (ref 3.7–5.3)
RBC # BLD: 2.24 M/UL (ref 3.95–5.11)
SODIUM BLD-SCNC: 146 MMOL/L (ref 135–144)
WBC # BLD: 2.1 K/UL (ref 3.5–11.3)

## 2019-01-08 PROCEDURE — P9603 ONE-WAY ALLOW PRORATED MILES: HCPCS

## 2019-01-08 PROCEDURE — 85027 COMPLETE CBC AUTOMATED: CPT

## 2019-01-08 PROCEDURE — 36415 COLL VENOUS BLD VENIPUNCTURE: CPT

## 2019-01-08 PROCEDURE — 80048 BASIC METABOLIC PNL TOTAL CA: CPT

## 2019-01-10 ENCOUNTER — TELEPHONE (OUTPATIENT)
Dept: PALLATIVE CARE | Age: 79
End: 2019-01-10

## 2019-01-15 ENCOUNTER — HOSPITAL ENCOUNTER (OUTPATIENT)
Dept: INTERVENTIONAL RADIOLOGY/VASCULAR | Age: 79
Discharge: HOME | End: 2019-01-17
Payer: MEDICARE

## 2019-01-15 ENCOUNTER — HOSPITAL ENCOUNTER (OUTPATIENT)
Age: 79
Setting detail: SPECIMEN
Discharge: HOME | End: 2019-01-15
Payer: MEDICARE

## 2019-01-15 VITALS
HEIGHT: 59 IN | SYSTOLIC BLOOD PRESSURE: 123 MMHG | WEIGHT: 116 LBS | RESPIRATION RATE: 14 BRPM | HEART RATE: 77 BPM | BODY MASS INDEX: 23.39 KG/M2 | DIASTOLIC BLOOD PRESSURE: 54 MMHG | OXYGEN SATURATION: 96 % | TEMPERATURE: 97.7 F

## 2019-01-15 DIAGNOSIS — R10.84 ABDOMINAL PAIN, GENERALIZED: ICD-10-CM

## 2019-01-15 LAB
ANION GAP SERPL CALCULATED.3IONS-SCNC: 9 MMOL/L (ref 9–17)
BUN BLDV-MCNC: 13 MG/DL (ref 8–23)
BUN BLDV-MCNC: 13 MG/DL (ref 8–23)
BUN/CREAT BLD: ABNORMAL (ref 9–20)
CALCIUM SERPL-MCNC: 8 MG/DL (ref 8.6–10.4)
CHLORIDE BLD-SCNC: 109 MMOL/L (ref 98–107)
CO2: 27 MMOL/L (ref 20–31)
CREAT SERPL-MCNC: 0.72 MG/DL (ref 0.5–0.9)
CREAT SERPL-MCNC: 0.81 MG/DL (ref 0.5–0.9)
GFR AFRICAN AMERICAN: >60 ML/MIN
GFR AFRICAN AMERICAN: >60 ML/MIN
GFR NON-AFRICAN AMERICAN: >60 ML/MIN
GFR NON-AFRICAN AMERICAN: >60 ML/MIN
GFR SERPL CREATININE-BSD FRML MDRD: ABNORMAL ML/MIN/{1.73_M2}
GFR SERPL CREATININE-BSD FRML MDRD: ABNORMAL ML/MIN/{1.73_M2}
GFR SERPL CREATININE-BSD FRML MDRD: NORMAL ML/MIN/{1.73_M2}
GFR SERPL CREATININE-BSD FRML MDRD: NORMAL ML/MIN/{1.73_M2}
GLUCOSE BLD-MCNC: 78 MG/DL (ref 70–99)
HCT VFR BLD CALC: 28.5 % (ref 36.3–47.1)
HEMOGLOBIN: 8.4 G/DL (ref 11.9–15.1)
INR BLD: 1.1
MCH RBC QN AUTO: 32.9 PG (ref 25.2–33.5)
MCHC RBC AUTO-ENTMCNC: 29.5 G/DL (ref 28.4–34.8)
MCV RBC AUTO: 111.8 FL (ref 82.6–102.9)
NRBC AUTOMATED: 0 PER 100 WBC
PARTIAL THROMBOPLASTIN TIME: 29.4 SEC (ref 23–31)
PDW BLD-RTO: 17.3 % (ref 11.8–14.4)
PLATELET # BLD: 111 K/UL (ref 138–453)
PLATELET # BLD: 140 K/UL (ref 130–400)
PMV BLD AUTO: 10.8 FL (ref 8.1–13.5)
POTASSIUM SERPL-SCNC: 3.5 MMOL/L (ref 3.7–5.3)
PROTHROMBIN TIME: 11.4 SEC (ref 9.7–11.6)
RBC # BLD: 2.55 M/UL (ref 3.95–5.11)
SODIUM BLD-SCNC: 145 MMOL/L (ref 135–144)
WBC # BLD: 2.3 K/UL (ref 3.5–11.3)

## 2019-01-15 PROCEDURE — 6360000002 HC RX W HCPCS: Performed by: SURGERY

## 2019-01-15 PROCEDURE — 85027 COMPLETE CBC AUTOMATED: CPT

## 2019-01-15 PROCEDURE — 85610 PROTHROMBIN TIME: CPT

## 2019-01-15 PROCEDURE — 85730 THROMBOPLASTIN TIME PARTIAL: CPT

## 2019-01-15 PROCEDURE — 99152 MOD SED SAME PHYS/QHP 5/>YRS: CPT | Performed by: SURGERY

## 2019-01-15 PROCEDURE — 82565 ASSAY OF CREATININE: CPT

## 2019-01-15 PROCEDURE — 36415 COLL VENOUS BLD VENIPUNCTURE: CPT

## 2019-01-15 PROCEDURE — 7100000010 HC PHASE II RECOVERY - FIRST 15 MIN

## 2019-01-15 PROCEDURE — 2580000003 HC RX 258: Performed by: SURGERY

## 2019-01-15 PROCEDURE — 99153 MOD SED SAME PHYS/QHP EA: CPT | Performed by: SURGERY

## 2019-01-15 PROCEDURE — 80048 BASIC METABOLIC PNL TOTAL CA: CPT

## 2019-01-15 PROCEDURE — 85049 AUTOMATED PLATELET COUNT: CPT

## 2019-01-15 PROCEDURE — 37236 OPEN/PERQ PLACE STENT 1ST: CPT | Performed by: SURGERY

## 2019-01-15 PROCEDURE — 6360000004 HC RX CONTRAST MEDICATION: Performed by: SURGERY

## 2019-01-15 PROCEDURE — 7100000011 HC PHASE II RECOVERY - ADDTL 15 MIN

## 2019-01-15 PROCEDURE — 7100000000 HC PACU RECOVERY - FIRST 15 MIN

## 2019-01-15 PROCEDURE — P9603 ONE-WAY ALLOW PRORATED MILES: HCPCS

## 2019-01-15 PROCEDURE — 84520 ASSAY OF UREA NITROGEN: CPT

## 2019-01-15 PROCEDURE — 7100000001 HC PACU RECOVERY - ADDTL 15 MIN

## 2019-01-15 PROCEDURE — C1769 GUIDE WIRE: HCPCS

## 2019-01-15 PROCEDURE — 75726 ARTERY X-RAYS ABDOMEN: CPT | Performed by: SURGERY

## 2019-01-15 PROCEDURE — 36245 INS CATH ABD/L-EXT ART 1ST: CPT | Performed by: SURGERY

## 2019-01-15 RX ORDER — FENTANYL CITRATE 50 UG/ML
INJECTION, SOLUTION INTRAMUSCULAR; INTRAVENOUS
Status: COMPLETED | OUTPATIENT
Start: 2019-01-15 | End: 2019-01-15

## 2019-01-15 RX ORDER — HYDROCODONE BITARTRATE AND ACETAMINOPHEN 5; 325 MG/1; MG/1
2 TABLET ORAL EVERY 4 HOURS PRN
Status: DISCONTINUED | OUTPATIENT
Start: 2019-01-15 | End: 2019-01-18 | Stop reason: HOSPADM

## 2019-01-15 RX ORDER — ONDANSETRON 2 MG/ML
4 INJECTION INTRAMUSCULAR; INTRAVENOUS EVERY 8 HOURS PRN
Status: DISCONTINUED | OUTPATIENT
Start: 2019-01-15 | End: 2019-01-18 | Stop reason: HOSPADM

## 2019-01-15 RX ORDER — HYDROCODONE BITARTRATE AND ACETAMINOPHEN 5; 325 MG/1; MG/1
1 TABLET ORAL EVERY 4 HOURS PRN
Status: DISCONTINUED | OUTPATIENT
Start: 2019-01-15 | End: 2019-01-18 | Stop reason: HOSPADM

## 2019-01-15 RX ORDER — PROTAMINE SULFATE 10 MG/ML
INJECTION, SOLUTION INTRAVENOUS
Status: COMPLETED | OUTPATIENT
Start: 2019-01-15 | End: 2019-01-15

## 2019-01-15 RX ORDER — HEPARIN SODIUM 5000 [USP'U]/ML
INJECTION, SOLUTION INTRAVENOUS; SUBCUTANEOUS
Status: COMPLETED | OUTPATIENT
Start: 2019-01-15 | End: 2019-01-15

## 2019-01-15 RX ORDER — ACETAMINOPHEN 325 MG/1
650 TABLET ORAL EVERY 4 HOURS PRN
Status: DISCONTINUED | OUTPATIENT
Start: 2019-01-15 | End: 2019-01-18 | Stop reason: HOSPADM

## 2019-01-15 RX ORDER — IODIXANOL 320 MG/ML
100 INJECTION, SOLUTION INTRAVASCULAR
Status: COMPLETED | OUTPATIENT
Start: 2019-01-15 | End: 2019-01-15

## 2019-01-15 RX ORDER — ONDANSETRON 2 MG/ML
4 INJECTION INTRAMUSCULAR; INTRAVENOUS EVERY 8 HOURS PRN
Status: DISCONTINUED | OUTPATIENT
Start: 2019-01-15 | End: 2019-01-15 | Stop reason: ALTCHOICE

## 2019-01-15 RX ORDER — MORPHINE SULFATE 4 MG/ML
4 INJECTION, SOLUTION INTRAMUSCULAR; INTRAVENOUS
Status: DISCONTINUED | OUTPATIENT
Start: 2019-01-15 | End: 2019-01-18 | Stop reason: HOSPADM

## 2019-01-15 RX ORDER — MORPHINE SULFATE 2 MG/ML
2 INJECTION, SOLUTION INTRAMUSCULAR; INTRAVENOUS
Status: DISCONTINUED | OUTPATIENT
Start: 2019-01-15 | End: 2019-01-18 | Stop reason: HOSPADM

## 2019-01-15 RX ORDER — DEXTROSE AND SODIUM CHLORIDE 5; .45 G/100ML; G/100ML
INJECTION, SOLUTION INTRAVENOUS CONTINUOUS
Status: DISCONTINUED | OUTPATIENT
Start: 2019-01-15 | End: 2019-01-18 | Stop reason: HOSPADM

## 2019-01-15 RX ORDER — SODIUM CHLORIDE 450 MG/100ML
INJECTION, SOLUTION INTRAVENOUS CONTINUOUS
Status: DISCONTINUED | OUTPATIENT
Start: 2019-01-15 | End: 2019-01-18 | Stop reason: HOSPADM

## 2019-01-15 RX ORDER — ONDANSETRON 4 MG/1
4 TABLET, ORALLY DISINTEGRATING ORAL EVERY 8 HOURS PRN
Status: DISCONTINUED | OUTPATIENT
Start: 2019-01-15 | End: 2019-01-18 | Stop reason: HOSPADM

## 2019-01-15 RX ORDER — MIDAZOLAM HYDROCHLORIDE 1 MG/ML
INJECTION INTRAMUSCULAR; INTRAVENOUS
Status: COMPLETED | OUTPATIENT
Start: 2019-01-15 | End: 2019-01-15

## 2019-01-15 RX ADMIN — MIDAZOLAM 1 MG: 1 INJECTION INTRAMUSCULAR; INTRAVENOUS at 12:37

## 2019-01-15 RX ADMIN — FENTANYL CITRATE 50 MCG: 50 INJECTION, SOLUTION INTRAMUSCULAR; INTRAVENOUS at 12:18

## 2019-01-15 RX ADMIN — FENTANYL CITRATE 50 MCG: 50 INJECTION, SOLUTION INTRAMUSCULAR; INTRAVENOUS at 12:37

## 2019-01-15 RX ADMIN — DEXTROSE AND SODIUM CHLORIDE: 5; 450 INJECTION, SOLUTION INTRAVENOUS at 10:54

## 2019-01-15 RX ADMIN — MIDAZOLAM 1 MG: 1 INJECTION INTRAMUSCULAR; INTRAVENOUS at 12:18

## 2019-01-15 RX ADMIN — IODIXANOL 100 ML: 320 INJECTION, SOLUTION INTRAVASCULAR at 12:27

## 2019-01-15 RX ADMIN — PROTAMINE SULFATE 10 MG: 10 INJECTION, SOLUTION INTRAVENOUS at 13:27

## 2019-01-15 RX ADMIN — HEPARIN SODIUM 5000 UNITS: 5000 INJECTION, SOLUTION INTRAVENOUS; SUBCUTANEOUS at 12:53

## 2019-01-15 ASSESSMENT — PAIN SCALES - GENERAL
PAINLEVEL_OUTOF10: 0

## 2019-01-15 ASSESSMENT — PAIN - FUNCTIONAL ASSESSMENT: PAIN_FUNCTIONAL_ASSESSMENT: 0-10

## 2019-01-24 ENCOUNTER — HOSPITAL ENCOUNTER (OUTPATIENT)
Age: 79
Setting detail: SPECIMEN
Discharge: HOME | End: 2019-01-24
Payer: MEDICARE

## 2019-01-24 LAB
ANION GAP SERPL CALCULATED.3IONS-SCNC: 15 MMOL/L (ref 9–17)
BUN BLDV-MCNC: 23 MG/DL (ref 8–23)
BUN/CREAT BLD: ABNORMAL (ref 9–20)
CALCIUM SERPL-MCNC: 8.3 MG/DL (ref 8.6–10.4)
CHLORIDE BLD-SCNC: 110 MMOL/L (ref 98–107)
CO2: 22 MMOL/L (ref 20–31)
CREAT SERPL-MCNC: 0.92 MG/DL (ref 0.5–0.9)
GFR AFRICAN AMERICAN: >60 ML/MIN
GFR NON-AFRICAN AMERICAN: 59 ML/MIN
GFR SERPL CREATININE-BSD FRML MDRD: ABNORMAL ML/MIN/{1.73_M2}
GFR SERPL CREATININE-BSD FRML MDRD: ABNORMAL ML/MIN/{1.73_M2}
GLUCOSE BLD-MCNC: 77 MG/DL (ref 70–99)
HCT VFR BLD CALC: 33.4 % (ref 36.3–47.1)
HEMOGLOBIN: 10 G/DL (ref 11.9–15.1)
MCH RBC QN AUTO: 33.3 PG (ref 25.2–33.5)
MCHC RBC AUTO-ENTMCNC: 29.9 G/DL (ref 28.4–34.8)
MCV RBC AUTO: 111.3 FL (ref 82.6–102.9)
NRBC AUTOMATED: 0 PER 100 WBC
PDW BLD-RTO: 16 % (ref 11.8–14.4)
PLATELET # BLD: 151 K/UL (ref 138–453)
PMV BLD AUTO: 11.4 FL (ref 8.1–13.5)
POTASSIUM SERPL-SCNC: 3.5 MMOL/L (ref 3.7–5.3)
RBC # BLD: 3 M/UL (ref 3.95–5.11)
SODIUM BLD-SCNC: 147 MMOL/L (ref 135–144)
WBC # BLD: 3.8 K/UL (ref 3.5–11.3)

## 2019-01-24 PROCEDURE — 85027 COMPLETE CBC AUTOMATED: CPT

## 2019-01-24 PROCEDURE — 80048 BASIC METABOLIC PNL TOTAL CA: CPT

## 2019-01-24 PROCEDURE — 36415 COLL VENOUS BLD VENIPUNCTURE: CPT

## 2019-01-24 PROCEDURE — P9603 ONE-WAY ALLOW PRORATED MILES: HCPCS

## 2019-01-29 ENCOUNTER — HOSPITAL ENCOUNTER (OUTPATIENT)
Age: 79
Setting detail: SPECIMEN
Discharge: HOME | End: 2019-01-29
Payer: MEDICARE

## 2019-01-29 LAB
ALBUMIN SERPL-MCNC: 2.9 G/DL (ref 3.5–5.2)
ALBUMIN/GLOBULIN RATIO: 0.9 (ref 1–2.5)
ALP BLD-CCNC: 73 U/L (ref 35–104)
ALT SERPL-CCNC: <5 U/L (ref 5–33)
ANION GAP SERPL CALCULATED.3IONS-SCNC: 9 MMOL/L (ref 9–17)
AST SERPL-CCNC: 14 U/L
BILIRUB SERPL-MCNC: 0.44 MG/DL (ref 0.3–1.2)
BUN BLDV-MCNC: 15 MG/DL (ref 8–23)
BUN/CREAT BLD: ABNORMAL (ref 9–20)
CALCIUM SERPL-MCNC: 8.4 MG/DL (ref 8.6–10.4)
CHLORIDE BLD-SCNC: 108 MMOL/L (ref 98–107)
CO2: 28 MMOL/L (ref 20–31)
CREAT SERPL-MCNC: 0.75 MG/DL (ref 0.5–0.9)
GFR AFRICAN AMERICAN: >60 ML/MIN
GFR NON-AFRICAN AMERICAN: >60 ML/MIN
GFR SERPL CREATININE-BSD FRML MDRD: ABNORMAL ML/MIN/{1.73_M2}
GFR SERPL CREATININE-BSD FRML MDRD: ABNORMAL ML/MIN/{1.73_M2}
GLUCOSE BLD-MCNC: 78 MG/DL (ref 70–99)
HCT VFR BLD CALC: 30.4 % (ref 36.3–47.1)
HEMOGLOBIN: 9.4 G/DL (ref 11.9–15.1)
MCH RBC QN AUTO: 33.1 PG (ref 25.2–33.5)
MCHC RBC AUTO-ENTMCNC: 30.9 G/DL (ref 28.4–34.8)
MCV RBC AUTO: 107 FL (ref 82.6–102.9)
NRBC AUTOMATED: 0 PER 100 WBC
PDW BLD-RTO: 15.9 % (ref 11.8–14.4)
PLATELET # BLD: 123 K/UL (ref 138–453)
PMV BLD AUTO: 11.1 FL (ref 8.1–13.5)
POTASSIUM SERPL-SCNC: 3.6 MMOL/L (ref 3.7–5.3)
RBC # BLD: 2.84 M/UL (ref 3.95–5.11)
SODIUM BLD-SCNC: 145 MMOL/L (ref 135–144)
TOTAL PROTEIN: 6.2 G/DL (ref 6.4–8.3)
WBC # BLD: 4 K/UL (ref 3.5–11.3)

## 2019-01-29 PROCEDURE — 85027 COMPLETE CBC AUTOMATED: CPT

## 2019-01-29 PROCEDURE — P9603 ONE-WAY ALLOW PRORATED MILES: HCPCS

## 2019-01-29 PROCEDURE — 36415 COLL VENOUS BLD VENIPUNCTURE: CPT

## 2019-01-29 PROCEDURE — 80053 COMPREHEN METABOLIC PANEL: CPT

## 2019-02-01 ENCOUNTER — HOSPITAL ENCOUNTER (OUTPATIENT)
Age: 79
Setting detail: SPECIMEN
Discharge: HOME | End: 2019-02-01
Payer: MEDICARE

## 2019-02-01 LAB
ANION GAP SERPL CALCULATED.3IONS-SCNC: 11 MMOL/L (ref 9–17)
BUN BLDV-MCNC: 18 MG/DL (ref 8–23)
BUN/CREAT BLD: ABNORMAL (ref 9–20)
CALCIUM SERPL-MCNC: 7.7 MG/DL (ref 8.6–10.4)
CHLORIDE BLD-SCNC: 111 MMOL/L (ref 98–107)
CO2: 23 MMOL/L (ref 20–31)
CREAT SERPL-MCNC: 0.91 MG/DL (ref 0.5–0.9)
GFR AFRICAN AMERICAN: >60 ML/MIN
GFR NON-AFRICAN AMERICAN: >60 ML/MIN
GFR SERPL CREATININE-BSD FRML MDRD: ABNORMAL ML/MIN/{1.73_M2}
GFR SERPL CREATININE-BSD FRML MDRD: ABNORMAL ML/MIN/{1.73_M2}
GLUCOSE BLD-MCNC: 106 MG/DL (ref 70–99)
POTASSIUM SERPL-SCNC: 3.7 MMOL/L (ref 3.7–5.3)
SODIUM BLD-SCNC: 145 MMOL/L (ref 135–144)

## 2019-02-01 PROCEDURE — 36415 COLL VENOUS BLD VENIPUNCTURE: CPT

## 2019-02-01 PROCEDURE — P9603 ONE-WAY ALLOW PRORATED MILES: HCPCS

## 2019-02-01 PROCEDURE — 80048 BASIC METABOLIC PNL TOTAL CA: CPT

## 2019-02-04 ENCOUNTER — HOSPITAL ENCOUNTER (OUTPATIENT)
Age: 79
Setting detail: SPECIMEN
Discharge: HOME | End: 2019-02-04
Payer: MEDICARE

## 2019-02-04 ENCOUNTER — OFFICE VISIT (OUTPATIENT)
Dept: NEUROLOGY | Age: 79
End: 2019-02-04
Payer: MEDICARE

## 2019-02-04 VITALS
HEIGHT: 59 IN | WEIGHT: 120 LBS | DIASTOLIC BLOOD PRESSURE: 43 MMHG | HEART RATE: 75 BPM | BODY MASS INDEX: 24.19 KG/M2 | SYSTOLIC BLOOD PRESSURE: 82 MMHG

## 2019-02-04 DIAGNOSIS — G20 PARKINSON'S DISEASE (HCC): Primary | ICD-10-CM

## 2019-02-04 DIAGNOSIS — G62.1 ALCOHOLIC PERIPHERAL NEUROPATHY (HCC): ICD-10-CM

## 2019-02-04 DIAGNOSIS — I65.21 STENOSIS OF RIGHT CAROTID ARTERY: ICD-10-CM

## 2019-02-04 LAB
ABSOLUTE EOS #: 0.2 K/UL (ref 0–0.44)
ABSOLUTE IMMATURE GRANULOCYTE: 0.03 K/UL (ref 0–0.3)
ABSOLUTE LYMPH #: 1.11 K/UL (ref 1.1–3.7)
ABSOLUTE MONO #: 0.36 K/UL (ref 0.1–1.2)
BASOPHILS # BLD: 1 % (ref 0–2)
BASOPHILS ABSOLUTE: 0.03 K/UL (ref 0–0.2)
DIFFERENTIAL TYPE: ABNORMAL
EOSINOPHILS RELATIVE PERCENT: 5 % (ref 1–4)
HCT VFR BLD CALC: 29.2 % (ref 36.3–47.1)
HEMOGLOBIN: 8.9 G/DL (ref 11.9–15.1)
IMMATURE GRANULOCYTES: 1 %
LYMPHOCYTES # BLD: 28 % (ref 24–43)
MCH RBC QN AUTO: 33.2 PG (ref 25.2–33.5)
MCHC RBC AUTO-ENTMCNC: 30.5 G/DL (ref 28.4–34.8)
MCV RBC AUTO: 109 FL (ref 82.6–102.9)
MONOCYTES # BLD: 9 % (ref 3–12)
NRBC AUTOMATED: 0.5 PER 100 WBC
PDW BLD-RTO: 16.4 % (ref 11.8–14.4)
PLATELET # BLD: 123 K/UL (ref 138–453)
PLATELET ESTIMATE: ABNORMAL
PMV BLD AUTO: 10.9 FL (ref 8.1–13.5)
RBC # BLD: 2.68 M/UL (ref 3.95–5.11)
RBC # BLD: ABNORMAL 10*6/UL
SEG NEUTROPHILS: 56 % (ref 36–65)
SEGMENTED NEUTROPHILS ABSOLUTE COUNT: 2.24 K/UL (ref 1.5–8.1)
WBC # BLD: 4 K/UL (ref 3.5–11.3)
WBC # BLD: ABNORMAL 10*3/UL

## 2019-02-04 PROCEDURE — G8400 PT W/DXA NO RESULTS DOC: HCPCS | Performed by: NURSE PRACTITIONER

## 2019-02-04 PROCEDURE — G8484 FLU IMMUNIZE NO ADMIN: HCPCS | Performed by: NURSE PRACTITIONER

## 2019-02-04 PROCEDURE — G8420 CALC BMI NORM PARAMETERS: HCPCS | Performed by: NURSE PRACTITIONER

## 2019-02-04 PROCEDURE — 1101F PT FALLS ASSESS-DOCD LE1/YR: CPT | Performed by: NURSE PRACTITIONER

## 2019-02-04 PROCEDURE — 4040F PNEUMOC VAC/ADMIN/RCVD: CPT | Performed by: NURSE PRACTITIONER

## 2019-02-04 PROCEDURE — P9603 ONE-WAY ALLOW PRORATED MILES: HCPCS

## 2019-02-04 PROCEDURE — G8427 DOCREV CUR MEDS BY ELIG CLIN: HCPCS | Performed by: NURSE PRACTITIONER

## 2019-02-04 PROCEDURE — 36415 COLL VENOUS BLD VENIPUNCTURE: CPT

## 2019-02-04 PROCEDURE — G8598 ASA/ANTIPLAT THER USED: HCPCS | Performed by: NURSE PRACTITIONER

## 2019-02-04 PROCEDURE — 85025 COMPLETE CBC W/AUTO DIFF WBC: CPT

## 2019-02-04 PROCEDURE — 1090F PRES/ABSN URINE INCON ASSESS: CPT | Performed by: NURSE PRACTITIONER

## 2019-02-04 PROCEDURE — 4004F PT TOBACCO SCREEN RCVD TLK: CPT | Performed by: NURSE PRACTITIONER

## 2019-02-04 PROCEDURE — 99214 OFFICE O/P EST MOD 30 MIN: CPT | Performed by: NURSE PRACTITIONER

## 2019-02-04 PROCEDURE — 1123F ACP DISCUSS/DSCN MKR DOCD: CPT | Performed by: NURSE PRACTITIONER

## 2019-02-04 RX ORDER — HYOSCYAMINE SULFATE 0.125 MG
125 TABLET ORAL 2 TIMES DAILY PRN
COMMUNITY
End: 2019-03-15 | Stop reason: ALTCHOICE

## 2019-02-04 RX ORDER — SENNA PLUS 8.6 MG/1
1 TABLET ORAL DAILY
COMMUNITY
End: 2019-04-30

## 2019-02-04 RX ORDER — FERROUS SULFATE 325(65) MG
325 TABLET ORAL 3 TIMES DAILY
COMMUNITY

## 2019-02-04 RX ORDER — TRAZODONE HYDROCHLORIDE 50 MG/1
50 TABLET ORAL NIGHTLY
COMMUNITY

## 2019-02-04 RX ORDER — GUAIFENESIN 600 MG/1
1200 TABLET, EXTENDED RELEASE ORAL 2 TIMES DAILY
COMMUNITY

## 2019-02-04 RX ORDER — ALPRAZOLAM 0.5 MG/1
0.5 TABLET ORAL 3 TIMES DAILY
COMMUNITY

## 2019-02-04 RX ORDER — HYDROCODONE BITARTRATE AND ACETAMINOPHEN 5; 325 MG/1; MG/1
1 TABLET ORAL EVERY 6 HOURS PRN
COMMUNITY

## 2019-02-04 RX ORDER — AMLODIPINE BESYLATE 10 MG/1
10 TABLET ORAL DAILY
COMMUNITY

## 2019-02-08 ENCOUNTER — HOSPITAL ENCOUNTER (OUTPATIENT)
Age: 79
Setting detail: SPECIMEN
Discharge: HOME | End: 2019-02-08
Payer: MEDICARE

## 2019-02-08 LAB
ANION GAP SERPL CALCULATED.3IONS-SCNC: 14 MMOL/L (ref 9–17)
BUN BLDV-MCNC: 16 MG/DL (ref 8–23)
BUN/CREAT BLD: 16 (ref 9–20)
CALCIUM SERPL-MCNC: 8.3 MG/DL (ref 8.6–10.4)
CHLORIDE BLD-SCNC: 106 MMOL/L (ref 98–107)
CO2: 26 MMOL/L (ref 20–31)
CREAT SERPL-MCNC: 1 MG/DL (ref 0.5–0.9)
GFR AFRICAN AMERICAN: >60 ML/MIN
GFR NON-AFRICAN AMERICAN: 54 ML/MIN
GFR SERPL CREATININE-BSD FRML MDRD: ABNORMAL ML/MIN/{1.73_M2}
GFR SERPL CREATININE-BSD FRML MDRD: ABNORMAL ML/MIN/{1.73_M2}
GLUCOSE BLD-MCNC: 67 MG/DL (ref 70–99)
HCT VFR BLD CALC: 27.8 % (ref 36–46)
HEMOGLOBIN: 9.1 G/DL (ref 12–16)
MCH RBC QN AUTO: 33.6 PG (ref 26–34)
MCHC RBC AUTO-ENTMCNC: 32.7 G/DL (ref 31–37)
MCV RBC AUTO: 102.8 FL (ref 80–100)
NRBC AUTOMATED: ABNORMAL PER 100 WBC
PDW BLD-RTO: 18.5 % (ref 11.5–14.5)
PLATELET # BLD: 155 K/UL (ref 130–400)
PMV BLD AUTO: 8.5 FL (ref 6–12)
POTASSIUM SERPL-SCNC: 3.9 MMOL/L (ref 3.7–5.3)
RBC # BLD: 2.7 M/UL (ref 4–5.2)
SODIUM BLD-SCNC: 146 MMOL/L (ref 135–144)
WBC # BLD: 3.8 K/UL (ref 3.5–11)

## 2019-02-08 PROCEDURE — 80048 BASIC METABOLIC PNL TOTAL CA: CPT

## 2019-02-08 PROCEDURE — 85027 COMPLETE CBC AUTOMATED: CPT

## 2019-02-08 PROCEDURE — P9603 ONE-WAY ALLOW PRORATED MILES: HCPCS

## 2019-02-08 PROCEDURE — 36415 COLL VENOUS BLD VENIPUNCTURE: CPT

## 2019-02-13 ENCOUNTER — HOSPITAL ENCOUNTER (OUTPATIENT)
Age: 79
Setting detail: SPECIMEN
Discharge: HOME | End: 2019-02-13
Payer: MEDICARE

## 2019-02-13 LAB
HCT VFR BLD CALC: 29.1 % (ref 36.3–47.1)
HEMOGLOBIN: 9 G/DL (ref 11.9–15.1)

## 2019-02-13 PROCEDURE — 85014 HEMATOCRIT: CPT

## 2019-02-13 PROCEDURE — 36415 COLL VENOUS BLD VENIPUNCTURE: CPT

## 2019-02-13 PROCEDURE — 85018 HEMOGLOBIN: CPT

## 2019-02-13 PROCEDURE — P9603 ONE-WAY ALLOW PRORATED MILES: HCPCS

## 2019-02-15 ENCOUNTER — HOSPITAL ENCOUNTER (OUTPATIENT)
Age: 79
Setting detail: SPECIMEN
Discharge: HOME | End: 2019-02-15
Payer: MEDICARE

## 2019-02-15 LAB
ANION GAP SERPL CALCULATED.3IONS-SCNC: 9 MMOL/L (ref 9–17)
BUN BLDV-MCNC: 13 MG/DL (ref 8–23)
BUN/CREAT BLD: ABNORMAL (ref 9–20)
CALCIUM SERPL-MCNC: 7.6 MG/DL (ref 8.6–10.4)
CHLORIDE BLD-SCNC: 112 MMOL/L (ref 98–107)
CO2: 25 MMOL/L (ref 20–31)
CREAT SERPL-MCNC: 0.77 MG/DL (ref 0.5–0.9)
GFR AFRICAN AMERICAN: >60 ML/MIN
GFR NON-AFRICAN AMERICAN: >60 ML/MIN
GFR SERPL CREATININE-BSD FRML MDRD: ABNORMAL ML/MIN/{1.73_M2}
GFR SERPL CREATININE-BSD FRML MDRD: ABNORMAL ML/MIN/{1.73_M2}
GLUCOSE BLD-MCNC: 74 MG/DL (ref 70–99)
HCT VFR BLD CALC: 27.1 % (ref 36.3–47.1)
HEMOGLOBIN: 8.1 G/DL (ref 11.9–15.1)
MCH RBC QN AUTO: 33.1 PG (ref 25.2–33.5)
MCHC RBC AUTO-ENTMCNC: 29.9 G/DL (ref 28.4–34.8)
MCV RBC AUTO: 110.6 FL (ref 82.6–102.9)
NRBC AUTOMATED: 0 PER 100 WBC
PDW BLD-RTO: 18.2 % (ref 11.8–14.4)
PLATELET # BLD: 106 K/UL (ref 138–453)
PMV BLD AUTO: 10.8 FL (ref 8.1–13.5)
POTASSIUM SERPL-SCNC: 4 MMOL/L (ref 3.7–5.3)
RBC # BLD: 2.45 M/UL (ref 3.95–5.11)
SODIUM BLD-SCNC: 146 MMOL/L (ref 135–144)
WBC # BLD: 2 K/UL (ref 3.5–11.3)

## 2019-02-15 PROCEDURE — 80048 BASIC METABOLIC PNL TOTAL CA: CPT

## 2019-02-15 PROCEDURE — 36415 COLL VENOUS BLD VENIPUNCTURE: CPT

## 2019-02-15 PROCEDURE — P9603 ONE-WAY ALLOW PRORATED MILES: HCPCS

## 2019-02-15 PROCEDURE — 85027 COMPLETE CBC AUTOMATED: CPT

## 2019-02-22 ENCOUNTER — HOSPITAL ENCOUNTER (OUTPATIENT)
Age: 79
Setting detail: SPECIMEN
Discharge: HOME | End: 2019-02-22
Payer: MEDICARE

## 2019-02-22 LAB
ANION GAP SERPL CALCULATED.3IONS-SCNC: 9 MMOL/L (ref 9–17)
BUN BLDV-MCNC: 12 MG/DL (ref 8–23)
BUN/CREAT BLD: ABNORMAL (ref 9–20)
CALCIUM SERPL-MCNC: 7.7 MG/DL (ref 8.6–10.4)
CHLORIDE BLD-SCNC: 111 MMOL/L (ref 98–107)
CO2: 27 MMOL/L (ref 20–31)
CREAT SERPL-MCNC: 0.8 MG/DL (ref 0.5–0.9)
GFR AFRICAN AMERICAN: >60 ML/MIN
GFR NON-AFRICAN AMERICAN: >60 ML/MIN
GFR SERPL CREATININE-BSD FRML MDRD: ABNORMAL ML/MIN/{1.73_M2}
GFR SERPL CREATININE-BSD FRML MDRD: ABNORMAL ML/MIN/{1.73_M2}
GLUCOSE BLD-MCNC: 68 MG/DL (ref 70–99)
HCT VFR BLD CALC: 28.1 % (ref 36.3–47.1)
HEMOGLOBIN: 8.6 G/DL (ref 11.9–15.1)
MCH RBC QN AUTO: 34.4 PG (ref 25.2–33.5)
MCHC RBC AUTO-ENTMCNC: 30.6 G/DL (ref 28.4–34.8)
MCV RBC AUTO: 112.4 FL (ref 82.6–102.9)
NRBC AUTOMATED: 0 PER 100 WBC
PDW BLD-RTO: 17 % (ref 11.8–14.4)
PLATELET # BLD: 92 K/UL (ref 138–453)
PMV BLD AUTO: 11.1 FL (ref 8.1–13.5)
POTASSIUM SERPL-SCNC: 3.4 MMOL/L (ref 3.7–5.3)
RBC # BLD: 2.5 M/UL (ref 3.95–5.11)
SODIUM BLD-SCNC: 147 MMOL/L (ref 135–144)
WBC # BLD: 2.1 K/UL (ref 3.5–11.3)

## 2019-02-22 PROCEDURE — 85027 COMPLETE CBC AUTOMATED: CPT

## 2019-02-22 PROCEDURE — 36415 COLL VENOUS BLD VENIPUNCTURE: CPT

## 2019-02-22 PROCEDURE — P9603 ONE-WAY ALLOW PRORATED MILES: HCPCS

## 2019-02-22 PROCEDURE — 80048 BASIC METABOLIC PNL TOTAL CA: CPT

## 2019-02-25 ENCOUNTER — HOSPITAL ENCOUNTER (OUTPATIENT)
Age: 79
Setting detail: SPECIMEN
Discharge: HOME | End: 2019-02-25
Payer: MEDICARE

## 2019-02-25 LAB
BILIRUBIN URINE: NEGATIVE
COLOR: YELLOW
COMMENT UA: ABNORMAL
GLUCOSE URINE: NEGATIVE
KETONES, URINE: NEGATIVE
LEUKOCYTE ESTERASE, URINE: ABNORMAL
NITRITE, URINE: NEGATIVE
PH UA: 5.5 (ref 5–8)
PROTEIN UA: NEGATIVE
SPECIFIC GRAVITY UA: 1.01 (ref 1–1.03)
TURBIDITY: ABNORMAL
URINE HGB: ABNORMAL
UROBILINOGEN, URINE: NORMAL

## 2019-02-25 PROCEDURE — 87186 SC STD MICRODIL/AGAR DIL: CPT

## 2019-02-25 PROCEDURE — 87086 URINE CULTURE/COLONY COUNT: CPT

## 2019-02-25 PROCEDURE — 87088 URINE BACTERIA CULTURE: CPT

## 2019-02-25 PROCEDURE — 81001 URINALYSIS AUTO W/SCOPE: CPT

## 2019-02-26 LAB
-: ABNORMAL
AMORPHOUS: ABNORMAL
BACTERIA: ABNORMAL
CASTS UA: ABNORMAL /LPF (ref 0–2)
CASTS UA: ABNORMAL /LPF (ref 0–2)
CRYSTALS, UA: ABNORMAL /HPF
CRYSTALS, UA: ABNORMAL /HPF
EPITHELIAL CELLS UA: ABNORMAL /HPF (ref 0–5)
MUCUS: ABNORMAL
OTHER OBSERVATIONS UA: ABNORMAL
RBC UA: ABNORMAL /HPF (ref 0–2)
RENAL EPITHELIAL, UA: ABNORMAL /HPF
TRICHOMONAS: ABNORMAL
WBC UA: ABNORMAL /HPF (ref 0–5)
YEAST: ABNORMAL

## 2019-02-27 ENCOUNTER — HOSPITAL ENCOUNTER (OUTPATIENT)
Age: 79
Setting detail: SPECIMEN
Discharge: HOME | End: 2019-02-27
Payer: MEDICARE

## 2019-02-27 LAB
CULTURE: ABNORMAL
HCT VFR BLD CALC: 26.8 % (ref 36.3–47.1)
HEMOGLOBIN: 8.5 G/DL (ref 11.9–15.1)
Lab: ABNORMAL
SPECIMEN DESCRIPTION: ABNORMAL

## 2019-02-27 PROCEDURE — 85014 HEMATOCRIT: CPT

## 2019-02-27 PROCEDURE — 85018 HEMOGLOBIN: CPT

## 2019-02-27 PROCEDURE — 36415 COLL VENOUS BLD VENIPUNCTURE: CPT

## 2019-02-27 PROCEDURE — P9603 ONE-WAY ALLOW PRORATED MILES: HCPCS

## 2019-03-01 ENCOUNTER — HOSPITAL ENCOUNTER (OUTPATIENT)
Age: 79
Setting detail: SPECIMEN
Discharge: HOME | End: 2019-03-01
Payer: MEDICARE

## 2019-03-01 LAB
ABSOLUTE EOS #: 0.09 K/UL (ref 0–0.44)
ABSOLUTE IMMATURE GRANULOCYTE: <0.03 K/UL (ref 0–0.3)
ABSOLUTE LYMPH #: 0.7 K/UL (ref 1.1–3.7)
ABSOLUTE MONO #: 0.24 K/UL (ref 0.1–1.2)
ANION GAP SERPL CALCULATED.3IONS-SCNC: 7 MMOL/L (ref 9–17)
BASOPHILS # BLD: 1 % (ref 0–2)
BASOPHILS ABSOLUTE: <0.03 K/UL (ref 0–0.2)
BUN BLDV-MCNC: 15 MG/DL (ref 8–23)
BUN/CREAT BLD: ABNORMAL (ref 9–20)
CALCIUM SERPL-MCNC: 7.7 MG/DL (ref 8.6–10.4)
CHLORIDE BLD-SCNC: 109 MMOL/L (ref 98–107)
CO2: 28 MMOL/L (ref 20–31)
CREAT SERPL-MCNC: 0.74 MG/DL (ref 0.5–0.9)
DIFFERENTIAL TYPE: ABNORMAL
EOSINOPHILS RELATIVE PERCENT: 4 % (ref 1–4)
GFR AFRICAN AMERICAN: >60 ML/MIN
GFR NON-AFRICAN AMERICAN: >60 ML/MIN
GFR SERPL CREATININE-BSD FRML MDRD: ABNORMAL ML/MIN/{1.73_M2}
GFR SERPL CREATININE-BSD FRML MDRD: ABNORMAL ML/MIN/{1.73_M2}
GLUCOSE BLD-MCNC: 70 MG/DL (ref 70–99)
HCT VFR BLD CALC: 26.8 % (ref 36.3–47.1)
HEMOGLOBIN: 8.1 G/DL (ref 11.9–15.1)
IMMATURE GRANULOCYTES: 1 %
LYMPHOCYTES # BLD: 33 % (ref 24–43)
MCH RBC QN AUTO: 33.2 PG (ref 25.2–33.5)
MCHC RBC AUTO-ENTMCNC: 30.2 G/DL (ref 28.4–34.8)
MCV RBC AUTO: 109.8 FL (ref 82.6–102.9)
MONOCYTES # BLD: 11 % (ref 3–12)
NRBC AUTOMATED: 0 PER 100 WBC
PDW BLD-RTO: 16.3 % (ref 11.8–14.4)
PLATELET # BLD: 94 K/UL (ref 138–453)
PLATELET ESTIMATE: ABNORMAL
PMV BLD AUTO: 11.8 FL (ref 8.1–13.5)
POTASSIUM SERPL-SCNC: 3.6 MMOL/L (ref 3.7–5.3)
RBC # BLD: 2.44 M/UL (ref 3.95–5.11)
RBC # BLD: ABNORMAL 10*6/UL
SEG NEUTROPHILS: 50 % (ref 36–65)
SEGMENTED NEUTROPHILS ABSOLUTE COUNT: 1.08 K/UL (ref 1.5–8.1)
SODIUM BLD-SCNC: 144 MMOL/L (ref 135–144)
WBC # BLD: 2.1 K/UL (ref 3.5–11.3)
WBC # BLD: ABNORMAL 10*3/UL

## 2019-03-01 PROCEDURE — 36415 COLL VENOUS BLD VENIPUNCTURE: CPT

## 2019-03-01 PROCEDURE — 85025 COMPLETE CBC W/AUTO DIFF WBC: CPT

## 2019-03-01 PROCEDURE — P9603 ONE-WAY ALLOW PRORATED MILES: HCPCS

## 2019-03-01 PROCEDURE — 80048 BASIC METABOLIC PNL TOTAL CA: CPT

## 2019-03-06 ENCOUNTER — HOSPITAL ENCOUNTER (OUTPATIENT)
Age: 79
Setting detail: SPECIMEN
Discharge: HOME | End: 2019-03-06
Payer: MEDICARE

## 2019-03-06 LAB
ANION GAP SERPL CALCULATED.3IONS-SCNC: 9 MMOL/L (ref 9–17)
BUN BLDV-MCNC: 15 MG/DL (ref 8–23)
BUN/CREAT BLD: ABNORMAL (ref 9–20)
CALCIUM SERPL-MCNC: 7.7 MG/DL (ref 8.6–10.4)
CHLORIDE BLD-SCNC: 106 MMOL/L (ref 98–107)
CO2: 31 MMOL/L (ref 20–31)
CREAT SERPL-MCNC: 0.79 MG/DL (ref 0.5–0.9)
GFR AFRICAN AMERICAN: >60 ML/MIN
GFR NON-AFRICAN AMERICAN: >60 ML/MIN
GFR SERPL CREATININE-BSD FRML MDRD: ABNORMAL ML/MIN/{1.73_M2}
GFR SERPL CREATININE-BSD FRML MDRD: ABNORMAL ML/MIN/{1.73_M2}
GLUCOSE BLD-MCNC: 76 MG/DL (ref 70–99)
HCT VFR BLD CALC: 30.4 % (ref 36.3–47.1)
HEMOGLOBIN: 9.4 G/DL (ref 11.9–15.1)
MCH RBC QN AUTO: 34.3 PG (ref 25.2–33.5)
MCHC RBC AUTO-ENTMCNC: 30.9 G/DL (ref 28.4–34.8)
MCV RBC AUTO: 110.9 FL (ref 82.6–102.9)
NRBC AUTOMATED: 0 PER 100 WBC
PDW BLD-RTO: 16.3 % (ref 11.8–14.4)
PLATELET # BLD: 96 K/UL (ref 138–453)
PMV BLD AUTO: 10.7 FL (ref 8.1–13.5)
POTASSIUM SERPL-SCNC: 3 MMOL/L (ref 3.7–5.3)
RBC # BLD: 2.74 M/UL (ref 3.95–5.11)
SODIUM BLD-SCNC: 146 MMOL/L (ref 135–144)
WBC # BLD: 2.4 K/UL (ref 3.5–11.3)

## 2019-03-06 PROCEDURE — 85027 COMPLETE CBC AUTOMATED: CPT

## 2019-03-06 PROCEDURE — 80048 BASIC METABOLIC PNL TOTAL CA: CPT

## 2019-03-06 PROCEDURE — 36415 COLL VENOUS BLD VENIPUNCTURE: CPT

## 2019-03-06 PROCEDURE — P9603 ONE-WAY ALLOW PRORATED MILES: HCPCS

## 2019-03-12 ENCOUNTER — OFFICE VISIT (OUTPATIENT)
Dept: OBGYN CLINIC | Age: 79
End: 2019-03-12
Payer: MEDICARE

## 2019-03-12 ENCOUNTER — HOSPITAL ENCOUNTER (OUTPATIENT)
Age: 79
Setting detail: SPECIMEN
Discharge: HOME | End: 2019-03-12
Payer: MEDICARE

## 2019-03-12 VITALS
WEIGHT: 117.8 LBS | SYSTOLIC BLOOD PRESSURE: 112 MMHG | DIASTOLIC BLOOD PRESSURE: 56 MMHG | HEIGHT: 59 IN | BODY MASS INDEX: 23.75 KG/M2

## 2019-03-12 DIAGNOSIS — N36.2 URETHRAL CARUNCLE: ICD-10-CM

## 2019-03-12 DIAGNOSIS — N93.9 VAGINAL BLEEDING: ICD-10-CM

## 2019-03-12 DIAGNOSIS — N93.9 VAGINAL BLEEDING: Primary | ICD-10-CM

## 2019-03-12 DIAGNOSIS — N36.9 URETHRAL LESION: ICD-10-CM

## 2019-03-12 LAB
DIRECT EXAM: NORMAL
Lab: NORMAL
SPECIMEN DESCRIPTION: NORMAL

## 2019-03-12 PROCEDURE — 1101F PT FALLS ASSESS-DOCD LE1/YR: CPT | Performed by: OBSTETRICS & GYNECOLOGY

## 2019-03-12 PROCEDURE — 4004F PT TOBACCO SCREEN RCVD TLK: CPT | Performed by: OBSTETRICS & GYNECOLOGY

## 2019-03-12 PROCEDURE — G8420 CALC BMI NORM PARAMETERS: HCPCS | Performed by: OBSTETRICS & GYNECOLOGY

## 2019-03-12 PROCEDURE — G8400 PT W/DXA NO RESULTS DOC: HCPCS | Performed by: OBSTETRICS & GYNECOLOGY

## 2019-03-12 PROCEDURE — 4040F PNEUMOC VAC/ADMIN/RCVD: CPT | Performed by: OBSTETRICS & GYNECOLOGY

## 2019-03-12 PROCEDURE — G8598 ASA/ANTIPLAT THER USED: HCPCS | Performed by: OBSTETRICS & GYNECOLOGY

## 2019-03-12 PROCEDURE — 1123F ACP DISCUSS/DSCN MKR DOCD: CPT | Performed by: OBSTETRICS & GYNECOLOGY

## 2019-03-12 PROCEDURE — G8484 FLU IMMUNIZE NO ADMIN: HCPCS | Performed by: OBSTETRICS & GYNECOLOGY

## 2019-03-12 PROCEDURE — 1090F PRES/ABSN URINE INCON ASSESS: CPT | Performed by: OBSTETRICS & GYNECOLOGY

## 2019-03-12 PROCEDURE — 99203 OFFICE O/P NEW LOW 30 MIN: CPT | Performed by: OBSTETRICS & GYNECOLOGY

## 2019-03-12 PROCEDURE — G8428 CUR MEDS NOT DOCUMENT: HCPCS | Performed by: OBSTETRICS & GYNECOLOGY

## 2019-03-12 ASSESSMENT — ENCOUNTER SYMPTOMS
NAUSEA: 0
ABDOMINAL PAIN: 0
WHEEZING: 0
DIARRHEA: 0
VOMITING: 0
CONSTIPATION: 0
COUGH: 0

## 2019-03-15 ENCOUNTER — HOSPITAL ENCOUNTER (OUTPATIENT)
Age: 79
Setting detail: SPECIMEN
Discharge: HOME | End: 2019-03-15
Payer: MEDICARE

## 2019-03-15 ENCOUNTER — HOSPITAL ENCOUNTER (OUTPATIENT)
Dept: INTERVENTIONAL RADIOLOGY/VASCULAR | Age: 79
Discharge: HOME | End: 2019-03-17
Payer: MEDICARE

## 2019-03-15 VITALS
WEIGHT: 117 LBS | RESPIRATION RATE: 18 BRPM | DIASTOLIC BLOOD PRESSURE: 49 MMHG | BODY MASS INDEX: 23.59 KG/M2 | SYSTOLIC BLOOD PRESSURE: 112 MMHG | HEIGHT: 59 IN | OXYGEN SATURATION: 92 % | HEART RATE: 76 BPM | TEMPERATURE: 98.1 F

## 2019-03-15 LAB
ANION GAP SERPL CALCULATED.3IONS-SCNC: 6 MMOL/L (ref 9–17)
BUN BLDV-MCNC: 12 MG/DL (ref 8–23)
BUN/CREAT BLD: ABNORMAL (ref 9–20)
CALCIUM SERPL-MCNC: 7.8 MG/DL (ref 8.6–10.4)
CHLORIDE BLD-SCNC: 107 MMOL/L (ref 98–107)
CO2: 29 MMOL/L (ref 20–31)
CREAT SERPL-MCNC: 0.69 MG/DL (ref 0.5–0.9)
GFR AFRICAN AMERICAN: >60 ML/MIN
GFR NON-AFRICAN AMERICAN: >60 ML/MIN
GFR SERPL CREATININE-BSD FRML MDRD: ABNORMAL ML/MIN/{1.73_M2}
GFR SERPL CREATININE-BSD FRML MDRD: ABNORMAL ML/MIN/{1.73_M2}
GLUCOSE BLD-MCNC: 79 MG/DL (ref 70–99)
HCT VFR BLD CALC: 29.7 % (ref 36.3–47.1)
HEMOGLOBIN: 9.3 G/DL (ref 11.9–15.1)
INR BLD: 1.1
MCH RBC QN AUTO: 34.8 PG (ref 25.2–33.5)
MCHC RBC AUTO-ENTMCNC: 31.3 G/DL (ref 28.4–34.8)
MCV RBC AUTO: 111.2 FL (ref 82.6–102.9)
NRBC AUTOMATED: 0 PER 100 WBC
PARTIAL THROMBOPLASTIN TIME: 28.3 SEC (ref 23–31)
PDW BLD-RTO: 17.1 % (ref 11.8–14.4)
PLATELET # BLD: 96 K/UL (ref 138–453)
PMV BLD AUTO: 11 FL (ref 8.1–13.5)
POTASSIUM SERPL-SCNC: 3.3 MMOL/L (ref 3.7–5.3)
PROTHROMBIN TIME: 10.9 SEC (ref 9.7–11.6)
RBC # BLD: 2.67 M/UL (ref 3.95–5.11)
SODIUM BLD-SCNC: 142 MMOL/L (ref 135–144)
WBC # BLD: 3.4 K/UL (ref 3.5–11.3)

## 2019-03-15 PROCEDURE — 36415 COLL VENOUS BLD VENIPUNCTURE: CPT

## 2019-03-15 PROCEDURE — 85027 COMPLETE CBC AUTOMATED: CPT

## 2019-03-15 PROCEDURE — P9603 ONE-WAY ALLOW PRORATED MILES: HCPCS

## 2019-03-15 PROCEDURE — 85730 THROMBOPLASTIN TIME PARTIAL: CPT

## 2019-03-15 PROCEDURE — 80048 BASIC METABOLIC PNL TOTAL CA: CPT

## 2019-03-15 PROCEDURE — 85610 PROTHROMBIN TIME: CPT

## 2019-03-15 RX ORDER — DEXTROSE AND SODIUM CHLORIDE 5; .45 G/100ML; G/100ML
INJECTION, SOLUTION INTRAVENOUS CONTINUOUS
Status: DISCONTINUED | OUTPATIENT
Start: 2019-03-15 | End: 2019-03-18 | Stop reason: HOSPADM

## 2019-03-15 RX ORDER — FLUTICASONE FUROATE AND VILANTEROL 200; 25 UG/1; UG/1
1 POWDER RESPIRATORY (INHALATION) DAILY
COMMUNITY

## 2019-03-15 ASSESSMENT — PAIN - FUNCTIONAL ASSESSMENT: PAIN_FUNCTIONAL_ASSESSMENT: 0-10

## 2019-03-19 ENCOUNTER — HOSPITAL ENCOUNTER (OUTPATIENT)
Age: 79
Setting detail: SPECIMEN
Discharge: HOME | End: 2019-03-19
Payer: MEDICARE

## 2019-03-19 PROCEDURE — 81001 URINALYSIS AUTO W/SCOPE: CPT

## 2019-03-19 PROCEDURE — 87086 URINE CULTURE/COLONY COUNT: CPT

## 2019-03-20 LAB
-: ABNORMAL
AMORPHOUS: ABNORMAL
BACTERIA: ABNORMAL
BILIRUBIN URINE: NEGATIVE
CASTS UA: ABNORMAL /LPF (ref 0–2)
COLOR: YELLOW
COMMENT UA: ABNORMAL
CRYSTALS, UA: ABNORMAL /HPF
CRYSTALS, UA: ABNORMAL /HPF
EPITHELIAL CELLS UA: ABNORMAL /HPF (ref 0–5)
GLUCOSE URINE: NEGATIVE
KETONES, URINE: NEGATIVE
LEUKOCYTE ESTERASE, URINE: ABNORMAL
MUCUS: ABNORMAL
NITRITE, URINE: NEGATIVE
OTHER OBSERVATIONS UA: ABNORMAL
PH UA: 5 (ref 5–8)
PROTEIN UA: NEGATIVE
RBC UA: ABNORMAL /HPF (ref 0–2)
RENAL EPITHELIAL, UA: ABNORMAL /HPF
SPECIFIC GRAVITY UA: 1.01 (ref 1–1.03)
TRICHOMONAS: ABNORMAL
TURBIDITY: ABNORMAL
URINE HGB: NEGATIVE
UROBILINOGEN, URINE: NORMAL
WBC UA: ABNORMAL /HPF (ref 0–5)
YEAST: ABNORMAL

## 2019-03-20 PROCEDURE — 87086 URINE CULTURE/COLONY COUNT: CPT

## 2019-03-21 LAB
CULTURE: NORMAL
Lab: NORMAL
SPECIMEN DESCRIPTION: NORMAL

## 2019-03-22 ENCOUNTER — HOSPITAL ENCOUNTER (OUTPATIENT)
Age: 79
Setting detail: SPECIMEN
Discharge: HOME | End: 2019-03-22
Payer: MEDICARE

## 2019-03-22 LAB
ANION GAP SERPL CALCULATED.3IONS-SCNC: 13 MMOL/L (ref 9–17)
BUN BLDV-MCNC: 14 MG/DL (ref 8–23)
BUN/CREAT BLD: ABNORMAL (ref 9–20)
CALCIUM SERPL-MCNC: 7.7 MG/DL (ref 8.6–10.4)
CHLORIDE BLD-SCNC: 111 MMOL/L (ref 98–107)
CO2: 23 MMOL/L (ref 20–31)
CREAT SERPL-MCNC: 0.76 MG/DL (ref 0.5–0.9)
GFR AFRICAN AMERICAN: >60 ML/MIN
GFR NON-AFRICAN AMERICAN: >60 ML/MIN
GFR SERPL CREATININE-BSD FRML MDRD: ABNORMAL ML/MIN/{1.73_M2}
GFR SERPL CREATININE-BSD FRML MDRD: ABNORMAL ML/MIN/{1.73_M2}
GLUCOSE BLD-MCNC: 76 MG/DL (ref 70–99)
HCT VFR BLD CALC: 29 % (ref 36.3–47.1)
HEMOGLOBIN: 8.6 G/DL (ref 11.9–15.1)
MCH RBC QN AUTO: 34.1 PG (ref 25.2–33.5)
MCHC RBC AUTO-ENTMCNC: 29.7 G/DL (ref 28.4–34.8)
MCV RBC AUTO: 115.1 FL (ref 82.6–102.9)
NRBC AUTOMATED: 0 PER 100 WBC
PDW BLD-RTO: 16.1 % (ref 11.8–14.4)
PLATELET # BLD: 107 K/UL (ref 138–453)
PMV BLD AUTO: 10.8 FL (ref 8.1–13.5)
POTASSIUM SERPL-SCNC: 3.7 MMOL/L (ref 3.7–5.3)
RBC # BLD: 2.52 M/UL (ref 3.95–5.11)
SODIUM BLD-SCNC: 147 MMOL/L (ref 135–144)
WBC # BLD: 2.2 K/UL (ref 3.5–11.3)

## 2019-03-22 PROCEDURE — P9603 ONE-WAY ALLOW PRORATED MILES: HCPCS

## 2019-03-22 PROCEDURE — 36415 COLL VENOUS BLD VENIPUNCTURE: CPT

## 2019-03-22 PROCEDURE — 80048 BASIC METABOLIC PNL TOTAL CA: CPT

## 2019-03-22 PROCEDURE — 85027 COMPLETE CBC AUTOMATED: CPT

## 2019-03-29 ENCOUNTER — HOSPITAL ENCOUNTER (OUTPATIENT)
Age: 79
Setting detail: SPECIMEN
Discharge: HOME | End: 2019-03-29
Payer: MEDICARE

## 2019-03-29 LAB
C DIFFICILE TOXINS, PCR: NORMAL
DATE, STOOL #1: NORMAL
DATE, STOOL #2: NORMAL
DATE, STOOL #3: NORMAL
HEMOCCULT SP1 STL QL: NEGATIVE
HEMOCCULT SP2 STL QL: NORMAL
HEMOCCULT SP3 STL QL: NORMAL
Lab: NORMAL
MICRO OVA & PARASITES: NORMAL
SPECIMEN DESCRIPTION: NORMAL
SPECIMEN DESCRIPTION: NORMAL
TIME, STOOL #1: NORMAL
TIME, STOOL #2: NORMAL
TIME, STOOL #3: NORMAL

## 2019-03-29 PROCEDURE — 87328 CRYPTOSPORIDIUM AG IA: CPT

## 2019-03-29 PROCEDURE — 82272 OCCULT BLD FECES 1-3 TESTS: CPT

## 2019-03-29 PROCEDURE — 87329 GIARDIA AG IA: CPT

## 2019-03-29 PROCEDURE — 87493 C DIFF AMPLIFIED PROBE: CPT

## 2019-03-31 LAB
DIRECT EXAM: NORMAL
DIRECT EXAM: NORMAL
Lab: NORMAL
SPECIMEN DESCRIPTION: NORMAL

## 2019-04-01 ENCOUNTER — HOSPITAL ENCOUNTER (OUTPATIENT)
Age: 79
Setting detail: SPECIMEN
Discharge: HOME | End: 2019-04-01
Payer: MEDICARE

## 2019-04-01 LAB
ABSOLUTE EOS #: 0.11 K/UL (ref 0–0.44)
ABSOLUTE IMMATURE GRANULOCYTE: 0 K/UL (ref 0–0.3)
ABSOLUTE LYMPH #: 0.53 K/UL (ref 1.1–3.7)
ABSOLUTE MONO #: 0.22 K/UL (ref 0.1–1.2)
ALBUMIN SERPL-MCNC: 2.5 G/DL (ref 3.5–5.2)
ALBUMIN/GLOBULIN RATIO: 0.9 (ref 1–2.5)
ALP BLD-CCNC: 62 U/L (ref 35–104)
ALT SERPL-CCNC: <5 U/L (ref 5–33)
ANION GAP SERPL CALCULATED.3IONS-SCNC: 9 MMOL/L (ref 9–17)
AST SERPL-CCNC: 16 U/L
BASOPHILS # BLD: 1 % (ref 0–2)
BASOPHILS ABSOLUTE: 0.03 K/UL (ref 0–0.2)
BILIRUB SERPL-MCNC: 0.32 MG/DL (ref 0.3–1.2)
BUN BLDV-MCNC: 13 MG/DL (ref 8–23)
BUN/CREAT BLD: ABNORMAL (ref 9–20)
CALCIUM SERPL-MCNC: 7.8 MG/DL (ref 8.6–10.4)
CHLORIDE BLD-SCNC: 109 MMOL/L (ref 98–107)
CO2: 26 MMOL/L (ref 20–31)
CREAT SERPL-MCNC: 0.86 MG/DL (ref 0.5–0.9)
DIFFERENTIAL TYPE: ABNORMAL
EOSINOPHILS RELATIVE PERCENT: 4 % (ref 1–4)
FERRITIN: 143 UG/L (ref 13–150)
GFR AFRICAN AMERICAN: >60 ML/MIN
GFR NON-AFRICAN AMERICAN: >60 ML/MIN
GFR SERPL CREATININE-BSD FRML MDRD: ABNORMAL ML/MIN/{1.73_M2}
GFR SERPL CREATININE-BSD FRML MDRD: ABNORMAL ML/MIN/{1.73_M2}
GLUCOSE BLD-MCNC: 82 MG/DL (ref 70–99)
HCT VFR BLD CALC: 28.9 % (ref 36.3–47.1)
HEMOGLOBIN: 8.9 G/DL (ref 11.9–15.1)
IMMATURE GRANULOCYTES: 0 %
LYMPHOCYTES # BLD: 19 % (ref 24–43)
MCH RBC QN AUTO: 34.5 PG (ref 25.2–33.5)
MCHC RBC AUTO-ENTMCNC: 30.8 G/DL (ref 28.4–34.8)
MCV RBC AUTO: 112 FL (ref 82.6–102.9)
MONOCYTES # BLD: 8 % (ref 3–12)
MORPHOLOGY: ABNORMAL
MORPHOLOGY: ABNORMAL
NRBC AUTOMATED: 0 PER 100 WBC
PDW BLD-RTO: 16.6 % (ref 11.8–14.4)
PLATELET # BLD: 99 K/UL (ref 138–453)
PLATELET ESTIMATE: ABNORMAL
PMV BLD AUTO: 10.5 FL (ref 8.1–13.5)
POTASSIUM SERPL-SCNC: 3.7 MMOL/L (ref 3.7–5.3)
RBC # BLD: 2.58 M/UL (ref 3.95–5.11)
RBC # BLD: ABNORMAL 10*6/UL
SEG NEUTROPHILS: 68 % (ref 36–65)
SEGMENTED NEUTROPHILS ABSOLUTE COUNT: 1.91 K/UL (ref 1.5–8.1)
SODIUM BLD-SCNC: 144 MMOL/L (ref 135–144)
TOTAL PROTEIN: 5.3 G/DL (ref 6.4–8.3)
WBC # BLD: 2.8 K/UL (ref 3.5–11.3)
WBC # BLD: ABNORMAL 10*3/UL

## 2019-04-01 PROCEDURE — 85025 COMPLETE CBC W/AUTO DIFF WBC: CPT

## 2019-04-01 PROCEDURE — P9603 ONE-WAY ALLOW PRORATED MILES: HCPCS

## 2019-04-01 PROCEDURE — 82728 ASSAY OF FERRITIN: CPT

## 2019-04-01 PROCEDURE — 80053 COMPREHEN METABOLIC PANEL: CPT

## 2019-04-01 PROCEDURE — 36415 COLL VENOUS BLD VENIPUNCTURE: CPT

## 2019-04-02 ENCOUNTER — HOSPITAL ENCOUNTER (OUTPATIENT)
Age: 79
Setting detail: SPECIMEN
Discharge: HOME | End: 2019-04-02
Payer: MEDICARE

## 2019-04-02 LAB
-: NORMAL
DATE, STOOL #1: ABNORMAL
DATE, STOOL #2: ABNORMAL
DATE, STOOL #3: ABNORMAL
HEMOCCULT SP1 STL QL: POSITIVE
HEMOCCULT SP2 STL QL: POSITIVE
HEMOCCULT SP3 STL QL: ABNORMAL
Lab: NORMAL
MICRO OVA & PARASITES: NORMAL
REASON FOR REJECTION: NORMAL
SPECIMEN DESCRIPTION: NORMAL
TIME, STOOL #1: ABNORMAL
TIME, STOOL #2: ABNORMAL
TIME, STOOL #3: ABNORMAL
ZZ NTE CLEAN UP: ORDERED TEST: NORMAL
ZZ NTE WITH NAME CLEAN UP: SPECIMEN SOURCE: NORMAL

## 2019-04-02 PROCEDURE — 82272 OCCULT BLD FECES 1-3 TESTS: CPT

## 2019-04-02 PROCEDURE — 87329 GIARDIA AG IA: CPT

## 2019-04-02 PROCEDURE — 87328 CRYPTOSPORIDIUM AG IA: CPT

## 2019-04-03 ENCOUNTER — HOSPITAL ENCOUNTER (OUTPATIENT)
Age: 79
Setting detail: SPECIMEN
Discharge: HOME | End: 2019-04-03
Payer: MEDICARE

## 2019-04-03 LAB
ABSOLUTE EOS #: 0 K/UL (ref 0–0.4)
ABSOLUTE IMMATURE GRANULOCYTE: 0 K/UL (ref 0–0.3)
ABSOLUTE LYMPH #: 0.41 K/UL (ref 1–4.8)
ABSOLUTE MONO #: 0.2 K/UL (ref 0.1–0.8)
BASOPHILS # BLD: 0 % (ref 0–2)
BASOPHILS ABSOLUTE: 0 K/UL (ref 0–0.2)
DIFFERENTIAL TYPE: ABNORMAL
DIRECT EXAM: NORMAL
DIRECT EXAM: NORMAL
EOSINOPHILS RELATIVE PERCENT: 0 % (ref 1–4)
HCT VFR BLD CALC: 25 % (ref 36.3–47.1)
HEMOGLOBIN: 7.7 G/DL (ref 11.9–15.1)
IMMATURE GRANULOCYTES: 0 %
LYMPHOCYTES # BLD: 24 % (ref 24–44)
Lab: NORMAL
MCH RBC QN AUTO: 34.2 PG (ref 25.2–33.5)
MCHC RBC AUTO-ENTMCNC: 30.8 G/DL (ref 28.4–34.8)
MCV RBC AUTO: 111.1 FL (ref 82.6–102.9)
MONOCYTES # BLD: 12 % (ref 1–7)
MORPHOLOGY: ABNORMAL
MORPHOLOGY: ABNORMAL
NRBC AUTOMATED: 0 PER 100 WBC
PDW BLD-RTO: 16.9 % (ref 11.8–14.4)
PLATELET # BLD: 68 K/UL (ref 138–453)
PLATELET ESTIMATE: ABNORMAL
PMV BLD AUTO: 10.8 FL (ref 8.1–13.5)
RBC # BLD: 2.25 M/UL (ref 3.95–5.11)
RBC # BLD: ABNORMAL 10*6/UL
SEG NEUTROPHILS: 64 % (ref 36–66)
SEGMENTED NEUTROPHILS ABSOLUTE COUNT: 1.09 K/UL (ref 1.8–7.7)
SPECIMEN DESCRIPTION: NORMAL
WBC # BLD: 1.7 K/UL (ref 3.5–11.3)
WBC # BLD: ABNORMAL 10*3/UL

## 2019-04-03 PROCEDURE — 36415 COLL VENOUS BLD VENIPUNCTURE: CPT

## 2019-04-03 PROCEDURE — 85025 COMPLETE CBC W/AUTO DIFF WBC: CPT

## 2019-04-03 PROCEDURE — P9603 ONE-WAY ALLOW PRORATED MILES: HCPCS

## 2019-04-05 ENCOUNTER — OFFICE VISIT (OUTPATIENT)
Dept: UROLOGY | Age: 79
End: 2019-04-05
Payer: MEDICARE

## 2019-04-05 ENCOUNTER — HOSPITAL ENCOUNTER (OUTPATIENT)
Age: 79
Setting detail: SPECIMEN
Discharge: HOME | End: 2019-04-05
Payer: MEDICARE

## 2019-04-05 VITALS
BODY MASS INDEX: 23.59 KG/M2 | HEIGHT: 59 IN | HEART RATE: 78 BPM | SYSTOLIC BLOOD PRESSURE: 103 MMHG | DIASTOLIC BLOOD PRESSURE: 48 MMHG | WEIGHT: 117 LBS

## 2019-04-05 DIAGNOSIS — N39.0 ACUTE LOWER UTI (URINARY TRACT INFECTION): Primary | ICD-10-CM

## 2019-04-05 DIAGNOSIS — R31.0 GROSS HEMATURIA: ICD-10-CM

## 2019-04-05 LAB
ABSOLUTE EOS #: 0.06 K/UL (ref 0–0.4)
ABSOLUTE IMMATURE GRANULOCYTE: 0 K/UL (ref 0–0.3)
ABSOLUTE LYMPH #: 0.76 K/UL (ref 1–4.8)
ABSOLUTE MONO #: 0.04 K/UL (ref 0.1–0.8)
ANION GAP SERPL CALCULATED.3IONS-SCNC: 9 MMOL/L (ref 9–17)
BASOPHILS # BLD: 0 % (ref 0–2)
BASOPHILS ABSOLUTE: 0 K/UL (ref 0–0.2)
BILIRUBIN, POC: ABNORMAL
BLOOD URINE, POC: ABNORMAL
BUN BLDV-MCNC: 12 MG/DL (ref 8–23)
BUN/CREAT BLD: ABNORMAL (ref 9–20)
CALCIUM SERPL-MCNC: 7.6 MG/DL (ref 8.6–10.4)
CHLORIDE BLD-SCNC: 111 MMOL/L (ref 98–107)
CLARITY, POC: CLEAR
CO2: 25 MMOL/L (ref 20–31)
COLOR, POC: YELLOW
CREAT SERPL-MCNC: 0.82 MG/DL (ref 0.5–0.9)
DIFFERENTIAL TYPE: ABNORMAL
EOSINOPHILS RELATIVE PERCENT: 3 % (ref 1–4)
GFR AFRICAN AMERICAN: >60 ML/MIN
GFR NON-AFRICAN AMERICAN: >60 ML/MIN
GFR SERPL CREATININE-BSD FRML MDRD: ABNORMAL ML/MIN/{1.73_M2}
GFR SERPL CREATININE-BSD FRML MDRD: ABNORMAL ML/MIN/{1.73_M2}
GLUCOSE BLD-MCNC: 70 MG/DL (ref 70–99)
GLUCOSE URINE, POC: ABNORMAL
HCT VFR BLD CALC: 28.4 % (ref 36.3–47.1)
HEMOGLOBIN: 8.6 G/DL (ref 11.9–15.1)
IMMATURE GRANULOCYTES: 0 %
KETONES, POC: ABNORMAL
LEUKOCYTE EST, POC: ABNORMAL
LYMPHOCYTES # BLD: 40 % (ref 24–44)
MCH RBC QN AUTO: 35 PG (ref 25.2–33.5)
MCHC RBC AUTO-ENTMCNC: 30.3 G/DL (ref 28.4–34.8)
MCV RBC AUTO: 115.4 FL (ref 82.6–102.9)
MONOCYTES # BLD: 2 % (ref 1–7)
MORPHOLOGY: ABNORMAL
MORPHOLOGY: ABNORMAL
NITRITE, POC: ABNORMAL
NRBC AUTOMATED: 1.1 PER 100 WBC
PDW BLD-RTO: 17.1 % (ref 11.8–14.4)
PH, POC: 5
PLATELET # BLD: 91 K/UL (ref 138–453)
PLATELET ESTIMATE: ABNORMAL
PMV BLD AUTO: 11.1 FL (ref 8.1–13.5)
POTASSIUM SERPL-SCNC: 3.9 MMOL/L (ref 3.7–5.3)
PROTEIN, POC: ABNORMAL
RBC # BLD: 2.46 M/UL (ref 3.95–5.11)
RBC # BLD: ABNORMAL 10*6/UL
SEG NEUTROPHILS: 55 % (ref 36–66)
SEGMENTED NEUTROPHILS ABSOLUTE COUNT: 1.04 K/UL (ref 1.8–7.7)
SODIUM BLD-SCNC: 145 MMOL/L (ref 135–144)
SPECIFIC GRAVITY, POC: 1.03
UROBILINOGEN, POC: 1
WBC # BLD: 1.9 K/UL (ref 3.5–11.3)
WBC # BLD: ABNORMAL 10*3/UL

## 2019-04-05 PROCEDURE — P9603 ONE-WAY ALLOW PRORATED MILES: HCPCS

## 2019-04-05 PROCEDURE — G8598 ASA/ANTIPLAT THER USED: HCPCS | Performed by: UROLOGY

## 2019-04-05 PROCEDURE — G8400 PT W/DXA NO RESULTS DOC: HCPCS | Performed by: UROLOGY

## 2019-04-05 PROCEDURE — 36415 COLL VENOUS BLD VENIPUNCTURE: CPT

## 2019-04-05 PROCEDURE — 4040F PNEUMOC VAC/ADMIN/RCVD: CPT | Performed by: UROLOGY

## 2019-04-05 PROCEDURE — 99204 OFFICE O/P NEW MOD 45 MIN: CPT | Performed by: UROLOGY

## 2019-04-05 PROCEDURE — 85025 COMPLETE CBC W/AUTO DIFF WBC: CPT

## 2019-04-05 PROCEDURE — 4004F PT TOBACCO SCREEN RCVD TLK: CPT | Performed by: UROLOGY

## 2019-04-05 PROCEDURE — 1090F PRES/ABSN URINE INCON ASSESS: CPT | Performed by: UROLOGY

## 2019-04-05 PROCEDURE — 1123F ACP DISCUSS/DSCN MKR DOCD: CPT | Performed by: UROLOGY

## 2019-04-05 PROCEDURE — G8420 CALC BMI NORM PARAMETERS: HCPCS | Performed by: UROLOGY

## 2019-04-05 PROCEDURE — 80048 BASIC METABOLIC PNL TOTAL CA: CPT

## 2019-04-05 PROCEDURE — G8428 CUR MEDS NOT DOCUMENT: HCPCS | Performed by: UROLOGY

## 2019-04-05 PROCEDURE — 81002 URINALYSIS NONAUTO W/O SCOPE: CPT | Performed by: UROLOGY

## 2019-04-05 NOTE — PROGRESS NOTES
[address]  [address]  [address]  [address] [facility] [address]  Dept: [phone]  Dept Fax: [phone] [facility], [address] [place] [address]  Urology Office Note - New Patient    Patient:  Marlene Palafox  YOB: 1940  Date: 4/6/2019    The patient is a 70 y.o. female who presents today for evaluation of the following problems: hematuria, urethral lesion  Chief Complaint   Patient presents with    New Patient     urethral lesion     referred/consultation requested by Arlette Garcia DO.      HISTORY OF PRESENT ILLNESS:     Onset was  Weeks ago  Overall, the problem(s) are unchanged. Severity is described as mild-moderate. Associated Symptoms: No dysuria, some gross hematuria. Current Pain Severity: 0        Has had hematuria and spotting.  Concern for urethral caruncle  Denies any pain, discomfort        Requested/reviewed records from Arlette Garcia DO office and/or outside physician/EMR    (Patient's old records have been requested, reviewed and pertinent findings summarized in today's note.)    Last several PSA's:  No results found for: PSA    Last total testosterone:  Lab Results   Component Value Date    TESTOSTERONE <10 08/12/2013       Urinalysis today:  Results for POC orders placed in visit on 04/05/19   POCT Urinalysis no Micro   Result Value Ref Range    Color, UA yellow     Clarity, UA clear     Glucose, UA POC neg     Bilirubin, UA neg     Ketones, UA neg     Spec Grav, UA 1.030     Blood, UA POC trace     pH, UA 5     Protein, UA POC neg     Urobilinogen, UA 1.0     Leukocytes, UA neg     Nitrite, UA neg        Last BUN and creatinine:  Lab Results   Component Value Date    BUN 12 04/05/2019     Lab Results   Component Value Date    CREATININE 0.82 04/05/2019       Additional Lab/Culture results: none    Imaging Reviewed during this Office Visit:   Nesha Estevez MD independently reviewed the images and verified the radiology reports from:    CT 8/25/18    1. No acute intra-abdominal process. 2. Stable benign-appearing cystic lesion involving the spleen measuring 2.2   cm. 3. Sigmoid descending colon diverticulosis. 4. Severe vascular calcifications.             PAST MEDICAL, FAMILY AND SOCIAL HISTORY:  Past Medical History:   Diagnosis Date    Airway obstruction     Alcoholic polyneuropathy ([address]     Angina effort ([address]     Anxiety     Atrial fibrillation (HCC)     CAD (coronary artery disease)     s/p stents    Cancer (HCC)     breast, right    CHF (congestive heart failure) (HCC)     unspecified diastolic    Convulsions (HCC)     COPD (chronic obstructive pulmonary disease) (HCC)     Degenerative disc disease     Cervical    Depression     Diabetes mellitus ([address]     Esophageal reflux     Hypertension     unspecified    Inflammatory spondylopathy ([address]     Iron deficiency     Lymphoma ([address] 1993    MDRO (multiple drug resistant organisms) resistance 8/10/2014    E. Coli urine    MRSA (methicillin resistant staph aureus) culture positive 07/13/2016    nares    Neuropathy     Osteoarthritis     Parkinson's disease ([address]     Peripheral vascular disease ([address]     Restless leg syndrome     Sleep apnea     Transient cerebral ischemia     Unsteady gait      Past Surgical History:   Procedure Laterality Date    BONE MARROW BIOPSY  11/29/2017    BREAST LUMPECTOMY      right     CHOLECYSTECTOMY      CORONARY ANGIOPLASTY WITH STENT PLACEMENT      unknown heart stents-1.5T MRI     FEMORAL-FEMORAL BYPASS GRAFT      HYSTERECTOMY      LUNG REMOVAL, PARTIAL      Lung CA     OTHER SURGICAL HISTORY  11/14/2018    attempted angiogram by Dr. Kishore Ott.  Unable to perform due to occlusion     Family History   Problem Relation Age of Onset    Kidney Disease Mother     Cancer Mother     Heart Disease Father     Coronary Art Dis Sister      Outpatient Medications Marked as Taking for the 4/5/19 encounter (Office Visit) with Wesley Dodson MD   Medication Sig Dispense Refill    conjugated estrogens (PREMARIN) 0.625 MG/GM vaginal cream Place 0.5 g vaginally daily Place vaginally daily. 1 Tube 3       Naprosyn [naproxen]; Acetate; Actonel [risedronate sodium]; Albuterol sulfate; Atenolol; Codeine; Cortisone; and Requip [ropinirole hcl]  Social History     Tobacco Use   Smoking Status Current Every Day Smoker    Packs/day: 0.25    Types: Cigarettes   Smokeless Tobacco Never Used   Tobacco Comment    trying to quit      (If patient a smoker, smoking cessation counseling offered)   Social History     Substance and Sexual Activity   Alcohol Use No       REVIEW OF SYSTEMS:  Constitutional: negative  Eyes: negative  Respiratory: negative  Cardiovascular: negative  Gastrointestinal: negative  Genitourinary: see HPI  Musculoskeletal: negative  Skin: negative   Neurological: negative  Hematological/Lymphatic: negative  Psychological: negative        Physical Exam:    This a 70 y.o. female  Vitals:    04/05/19 1011   BP: (!) 103/48   Pulse: 78     Body mass index is 23.63 kg/m². Constitutional: Patient in no acute distress; wheelchair bound  Neuro: alert and oriented to person place and time. Psych: Mood and affect normal.  Skin: warm, dry  Lungs: Respiratory effort normal, CTA  Cardiovascular:  Normal peripheral pulses; Regular rate  Abdomen: Soft, non-tender, non-distended with no CVA, flank pain, hepatosplenomegaly or hernia. Kidneys normal.  Bladder non-tender and not distended. Lymphatics: no palpable lymphadenopathy  Minimal/no edema in bilateral lower extremities      Assessment and Plan        1. Acute lower UTI (urinary tract infection)    2.  Gross hematuria               Plan:        Premarin cream   Urine culture negative  Has persistent hematuria  Will perform cystoscopy with pelvic exam local    Prescriptions Ordered:  Orders Placed This Encounter   Medications    conjugated estrogens (PREMARIN) 0.625 MG/GM vaginal cream     Sig: Place 0.5 g vaginally daily Place vaginally daily.      Dispense:  1 Tube     Refill:  3      Orders Placed:  Orders Placed This Encounter   Procedures    POCT Urinalysis marcelino Ma MD

## 2019-04-11 ENCOUNTER — TELEPHONE (OUTPATIENT)
Dept: UROLOGY | Age: 79
End: 2019-04-11

## 2019-04-11 NOTE — TELEPHONE ENCOUNTER
Patient is scheduled for a cystoscopy/pelvic exam on 5/10 at 11:15AM.  [facility], where patient resides, Alan Arteaga, patient's nurse, confirmed and verbalized understanding. Letter faxed to facility with date, time and instructions.

## 2019-04-12 ENCOUNTER — HOSPITAL ENCOUNTER (OUTPATIENT)
Age: 79
Setting detail: SPECIMEN
Discharge: HOME | End: 2019-04-12
Payer: MEDICARE

## 2019-04-12 LAB
ANION GAP SERPL CALCULATED.3IONS-SCNC: 9 MMOL/L (ref 9–17)
BUN BLDV-MCNC: 13 MG/DL (ref 8–23)
BUN/CREAT BLD: ABNORMAL (ref 9–20)
CALCIUM SERPL-MCNC: 7.4 MG/DL (ref 8.6–10.4)
CHLORIDE BLD-SCNC: 113 MMOL/L (ref 98–107)
CO2: 26 MMOL/L (ref 20–31)
CREAT SERPL-MCNC: 0.67 MG/DL (ref 0.5–0.9)
GFR AFRICAN AMERICAN: >60 ML/MIN
GFR NON-AFRICAN AMERICAN: >60 ML/MIN
GFR SERPL CREATININE-BSD FRML MDRD: ABNORMAL ML/MIN/{1.73_M2}
GFR SERPL CREATININE-BSD FRML MDRD: ABNORMAL ML/MIN/{1.73_M2}
GLUCOSE BLD-MCNC: 76 MG/DL (ref 70–99)
HCT VFR BLD CALC: 28.4 % (ref 36.3–47.1)
HEMOGLOBIN: 8.6 G/DL (ref 11.9–15.1)
MCH RBC QN AUTO: 33.9 PG (ref 25.2–33.5)
MCHC RBC AUTO-ENTMCNC: 30.3 G/DL (ref 28.4–34.8)
MCV RBC AUTO: 111.8 FL (ref 82.6–102.9)
NRBC AUTOMATED: 0 PER 100 WBC
PDW BLD-RTO: 16.7 % (ref 11.8–14.4)
PLATELET # BLD: 95 K/UL (ref 138–453)
PMV BLD AUTO: 11.3 FL (ref 8.1–13.5)
POTASSIUM SERPL-SCNC: 3.6 MMOL/L (ref 3.7–5.3)
RBC # BLD: 2.54 M/UL (ref 3.95–5.11)
SODIUM BLD-SCNC: 148 MMOL/L (ref 135–144)
WBC # BLD: 2.2 K/UL (ref 3.5–11.3)

## 2019-04-12 PROCEDURE — 36415 COLL VENOUS BLD VENIPUNCTURE: CPT

## 2019-04-12 PROCEDURE — 80048 BASIC METABOLIC PNL TOTAL CA: CPT

## 2019-04-12 PROCEDURE — P9603 ONE-WAY ALLOW PRORATED MILES: HCPCS

## 2019-04-12 PROCEDURE — 85027 COMPLETE CBC AUTOMATED: CPT

## 2019-04-19 ENCOUNTER — HOSPITAL ENCOUNTER (OUTPATIENT)
Age: 79
Setting detail: SPECIMEN
Discharge: HOME | End: 2019-04-19
Payer: MEDICARE

## 2019-04-19 LAB
ANION GAP SERPL CALCULATED.3IONS-SCNC: 8 MMOL/L (ref 9–17)
BUN BLDV-MCNC: 19 MG/DL (ref 8–23)
BUN/CREAT BLD: ABNORMAL (ref 9–20)
CALCIUM SERPL-MCNC: 7.6 MG/DL (ref 8.6–10.4)
CHLORIDE BLD-SCNC: 113 MMOL/L (ref 98–107)
CO2: 24 MMOL/L (ref 20–31)
CREAT SERPL-MCNC: 0.77 MG/DL (ref 0.5–0.9)
GFR AFRICAN AMERICAN: >60 ML/MIN
GFR NON-AFRICAN AMERICAN: >60 ML/MIN
GFR SERPL CREATININE-BSD FRML MDRD: ABNORMAL ML/MIN/{1.73_M2}
GFR SERPL CREATININE-BSD FRML MDRD: ABNORMAL ML/MIN/{1.73_M2}
GLUCOSE BLD-MCNC: 86 MG/DL (ref 70–99)
HCT VFR BLD CALC: 26.3 % (ref 36.3–47.1)
HEMOGLOBIN: 8 G/DL (ref 11.9–15.1)
MCH RBC QN AUTO: 34.6 PG (ref 25.2–33.5)
MCHC RBC AUTO-ENTMCNC: 30.4 G/DL (ref 28.4–34.8)
MCV RBC AUTO: 113.9 FL (ref 82.6–102.9)
NRBC AUTOMATED: 0 PER 100 WBC
PDW BLD-RTO: 16.2 % (ref 11.8–14.4)
PLATELET # BLD: 106 K/UL (ref 138–453)
PMV BLD AUTO: 11.6 FL (ref 8.1–13.5)
POTASSIUM SERPL-SCNC: 3.8 MMOL/L (ref 3.7–5.3)
RBC # BLD: 2.31 M/UL (ref 3.95–5.11)
SODIUM BLD-SCNC: 145 MMOL/L (ref 135–144)
WBC # BLD: 4.3 K/UL (ref 3.5–11.3)

## 2019-04-19 PROCEDURE — 80048 BASIC METABOLIC PNL TOTAL CA: CPT

## 2019-04-19 PROCEDURE — 36415 COLL VENOUS BLD VENIPUNCTURE: CPT

## 2019-04-19 PROCEDURE — 85027 COMPLETE CBC AUTOMATED: CPT

## 2019-04-19 PROCEDURE — P9603 ONE-WAY ALLOW PRORATED MILES: HCPCS

## 2019-04-23 ENCOUNTER — HOSPITAL ENCOUNTER (OUTPATIENT)
Dept: INTERVENTIONAL RADIOLOGY/VASCULAR | Age: 79
Discharge: HOME | End: 2019-04-25
Payer: MEDICARE

## 2019-04-23 VITALS
HEART RATE: 81 BPM | BODY MASS INDEX: 23.79 KG/M2 | RESPIRATION RATE: 18 BRPM | TEMPERATURE: 98.1 F | SYSTOLIC BLOOD PRESSURE: 103 MMHG | DIASTOLIC BLOOD PRESSURE: 49 MMHG | OXYGEN SATURATION: 92 % | HEIGHT: 59 IN | WEIGHT: 118 LBS

## 2019-04-23 DIAGNOSIS — I70.1 RIGHT RENAL ARTERY STENOSIS (HCC): ICD-10-CM

## 2019-04-23 LAB
BUN BLDV-MCNC: 14 MG/DL (ref 8–23)
CREAT SERPL-MCNC: 0.76 MG/DL (ref 0.5–0.9)
GFR AFRICAN AMERICAN: >60 ML/MIN
GFR NON-AFRICAN AMERICAN: >60 ML/MIN
GFR SERPL CREATININE-BSD FRML MDRD: NORMAL ML/MIN/{1.73_M2}
GFR SERPL CREATININE-BSD FRML MDRD: NORMAL ML/MIN/{1.73_M2}
GLUCOSE BLD-MCNC: 89 MG/DL (ref 65–105)
INR BLD: 1.1
PARTIAL THROMBOPLASTIN TIME: 21.4 SEC (ref 23–31)
PLATELET # BLD: 114 K/UL (ref 130–400)
PROTHROMBIN TIME: 11.3 SEC (ref 9.7–11.6)

## 2019-04-23 PROCEDURE — 37236 OPEN/PERQ PLACE STENT 1ST: CPT | Performed by: SURGERY

## 2019-04-23 PROCEDURE — 7100000010 HC PHASE II RECOVERY - FIRST 15 MIN

## 2019-04-23 PROCEDURE — 99152 MOD SED SAME PHYS/QHP 5/>YRS: CPT | Performed by: SURGERY

## 2019-04-23 PROCEDURE — C1769 GUIDE WIRE: HCPCS

## 2019-04-23 PROCEDURE — 6360000002 HC RX W HCPCS: Performed by: SURGERY

## 2019-04-23 PROCEDURE — 82947 ASSAY GLUCOSE BLOOD QUANT: CPT

## 2019-04-23 PROCEDURE — 6360000004 HC RX CONTRAST MEDICATION: Performed by: SURGERY

## 2019-04-23 PROCEDURE — 85049 AUTOMATED PLATELET COUNT: CPT

## 2019-04-23 PROCEDURE — 36251 INS CATH REN ART 1ST UNILAT: CPT | Performed by: SURGERY

## 2019-04-23 PROCEDURE — 85610 PROTHROMBIN TIME: CPT

## 2019-04-23 PROCEDURE — 82565 ASSAY OF CREATININE: CPT

## 2019-04-23 PROCEDURE — 99153 MOD SED SAME PHYS/QHP EA: CPT | Performed by: SURGERY

## 2019-04-23 PROCEDURE — 84520 ASSAY OF UREA NITROGEN: CPT

## 2019-04-23 PROCEDURE — 7100000011 HC PHASE II RECOVERY - ADDTL 15 MIN

## 2019-04-23 PROCEDURE — 85730 THROMBOPLASTIN TIME PARTIAL: CPT

## 2019-04-23 PROCEDURE — 2580000003 HC RX 258: Performed by: SURGERY

## 2019-04-23 RX ORDER — IODIXANOL 320 MG/ML
INJECTION, SOLUTION INTRAVASCULAR
Status: COMPLETED | OUTPATIENT
Start: 2019-04-23 | End: 2019-04-23

## 2019-04-23 RX ORDER — FENTANYL CITRATE 50 UG/ML
INJECTION, SOLUTION INTRAMUSCULAR; INTRAVENOUS
Status: COMPLETED | OUTPATIENT
Start: 2019-04-23 | End: 2019-04-23

## 2019-04-23 RX ORDER — HYDROCODONE BITARTRATE AND ACETAMINOPHEN 5; 325 MG/1; MG/1
2 TABLET ORAL EVERY 4 HOURS PRN
Status: DISCONTINUED | OUTPATIENT
Start: 2019-04-23 | End: 2019-04-26 | Stop reason: HOSPADM

## 2019-04-23 RX ORDER — SODIUM CHLORIDE 450 MG/100ML
INJECTION, SOLUTION INTRAVENOUS CONTINUOUS
Status: DISCONTINUED | OUTPATIENT
Start: 2019-04-23 | End: 2019-04-26 | Stop reason: HOSPADM

## 2019-04-23 RX ORDER — PROTAMINE SULFATE 10 MG/ML
20 INJECTION, SOLUTION INTRAVENOUS ONCE
Status: COMPLETED | OUTPATIENT
Start: 2019-04-23 | End: 2019-04-23

## 2019-04-23 RX ORDER — MIDAZOLAM HYDROCHLORIDE 1 MG/ML
INJECTION INTRAMUSCULAR; INTRAVENOUS
Status: COMPLETED | OUTPATIENT
Start: 2019-04-23 | End: 2019-04-23

## 2019-04-23 RX ORDER — MORPHINE SULFATE 2 MG/ML
2 INJECTION, SOLUTION INTRAMUSCULAR; INTRAVENOUS
Status: DISCONTINUED | OUTPATIENT
Start: 2019-04-23 | End: 2019-04-26 | Stop reason: HOSPADM

## 2019-04-23 RX ORDER — HYDROCODONE BITARTRATE AND ACETAMINOPHEN 5; 325 MG/1; MG/1
1 TABLET ORAL EVERY 4 HOURS PRN
Status: DISCONTINUED | OUTPATIENT
Start: 2019-04-23 | End: 2019-04-26 | Stop reason: HOSPADM

## 2019-04-23 RX ORDER — MORPHINE SULFATE 4 MG/ML
4 INJECTION, SOLUTION INTRAMUSCULAR; INTRAVENOUS
Status: DISCONTINUED | OUTPATIENT
Start: 2019-04-23 | End: 2019-04-26 | Stop reason: HOSPADM

## 2019-04-23 RX ORDER — ACETAMINOPHEN 325 MG/1
650 TABLET ORAL EVERY 4 HOURS PRN
Status: DISCONTINUED | OUTPATIENT
Start: 2019-04-23 | End: 2019-04-26 | Stop reason: HOSPADM

## 2019-04-23 RX ORDER — DEXTROSE AND SODIUM CHLORIDE 5; .45 G/100ML; G/100ML
INJECTION, SOLUTION INTRAVENOUS CONTINUOUS
Status: DISCONTINUED | OUTPATIENT
Start: 2019-04-23 | End: 2019-04-26 | Stop reason: HOSPADM

## 2019-04-23 RX ORDER — HEPARIN SODIUM 1000 [USP'U]/ML
INJECTION, SOLUTION INTRAVENOUS; SUBCUTANEOUS
Status: COMPLETED | OUTPATIENT
Start: 2019-04-23 | End: 2019-04-23

## 2019-04-23 RX ORDER — ONDANSETRON 2 MG/ML
4 INJECTION INTRAMUSCULAR; INTRAVENOUS EVERY 8 HOURS PRN
Status: DISCONTINUED | OUTPATIENT
Start: 2019-04-23 | End: 2019-04-26 | Stop reason: HOSPADM

## 2019-04-23 RX ORDER — DOXYCYCLINE HYCLATE 100 MG/1
100 CAPSULE ORAL 2 TIMES DAILY
COMMUNITY
Start: 2019-04-10 | End: 2019-04-24

## 2019-04-23 RX ADMIN — MIDAZOLAM 1 MG: 1 INJECTION INTRAMUSCULAR; INTRAVENOUS at 14:36

## 2019-04-23 RX ADMIN — IODIXANOL 100 ML: 320 INJECTION, SOLUTION INTRAVASCULAR at 15:20

## 2019-04-23 RX ADMIN — PROTAMINE SULFATE 20 MG: 10 INJECTION, SOLUTION INTRAVENOUS at 15:22

## 2019-04-23 RX ADMIN — DEXTROSE AND SODIUM CHLORIDE: 5; 450 INJECTION, SOLUTION INTRAVENOUS at 10:51

## 2019-04-23 RX ADMIN — Medication 50 MCG: at 14:36

## 2019-04-23 RX ADMIN — HEPARIN SODIUM 4000 UNITS: 1000 INJECTION, SOLUTION INTRAVENOUS; SUBCUTANEOUS at 15:00

## 2019-04-23 ASSESSMENT — PAIN SCALES - GENERAL
PAINLEVEL_OUTOF10: 0

## 2019-04-23 ASSESSMENT — PAIN - FUNCTIONAL ASSESSMENT: PAIN_FUNCTIONAL_ASSESSMENT: 0-10

## 2019-04-23 NOTE — POST SEDATION
Sedation Post Procedure Note    Patient Name: Jake Laguna   YOB: 1940  Room/Bed: Room/bed info not found  Medical Record Number: [id number]  Date: 4/23/2019   Time: 3:46 PM         Physicians/Assistants: Juliano Starr MD    Procedure Performed:  Selective right renal angiography  Primary stenting right renal artery with 5 mm x 15 mm express monorail stent  Ultrasound-guided access left brachial artery  Conscious sedation    Post-Sedation Vital Signs:  Vitals:    04/23/19 1540   BP: 119/77   Pulse: 78   Resp: 18   Temp:    SpO2: 94%      Vital signs were reviewed and were stable after the procedure (see flow sheet for vitals)            Post-Sedation Exam: Lungs: clear to auscultation bilaterally and Cardiovascular: regular rate and rhythm           Complications: none    Electronically signed by Juliano Starr MD on 4/23/2019 at 3:46 PM

## 2019-04-23 NOTE — BRIEF OP NOTE
Brief Postoperative Note  ______________________________________________________________    Patient: Jamar Avery  YOB: 1940  MRN: [id number]  Date of Procedure: 4/23/2019    Pre-Op Diagnosis: Severe right renal artery stenosis    Post-Op Diagnosis: Same       Procedure: Selective right renal angiography  Primary stenting right renal artery with 5 mm x 15 mm express monorail stent  Ultrasound-guided access left brachial artery  Conscious sedation    Anesthesia: Local/IV sedation    Surgeon: Luma Goode MD    Assistant:     Estimated Blood Loss (mL): Minimal    Complications: None    Specimens:   * No specimens in log *    Implants:  * No implants in log *      Drains: * No LDAs found *    Findings: 80% right renal artery stenosis with resolution post stenting    Luma Goode MD  Date: 4/23/2019  Time: 3:44 PM

## 2019-04-23 NOTE — PRE SEDATION
Sedation Pre-Procedure Note    Patient Name: Va Kearns   YOB: 1940  Room/Bed: Room/bed info not found  Medical Record Number: [id number]  Date: 4/23/2019   Time: 3:38 PM       Indication:  Renal artery stenosis    Consent: I have discussed with the patient and/or the patient representative the indication, alternatives, and the possible risks and/or complications of the planned procedure and the anesthesia methods. The patient and/or patient representative appear to understand and agree to proceed. Vital Signs:   Vitals:    04/23/19 1525   BP: 115/65   Pulse: 81   Resp: 10   Temp:    SpO2: 95%       Past Medical History:   has a past medical history of Airway obstruction, Alcoholic polyneuropathy ([address] Angina effort ([address] Anxiety, Atrial fibrillation ([address] CAD (coronary artery disease), Cancer ([address] CHF (congestive heart failure) ([address] Convulsions ([address] COPD (chronic obstructive pulmonary disease) ([address] Degenerative disc disease, Depression, Diabetes mellitus ([address] Esophageal reflux, Hypertension, Inflammatory spondylopathy ([address] Iron deficiency, Lymphoma ([address] MDRO (multiple drug resistant organisms) resistance, MRSA (methicillin resistant staph aureus) culture positive, Neuropathy, Osteoarthritis, Parkinson's disease ([address] Peripheral vascular disease ([address] Restless leg syndrome, Sleep apnea, Transient cerebral ischemia, and Unsteady gait. Past Surgical History:   has a past surgical history that includes Cholecystectomy; Hysterectomy; Lung removal, partial; Breast lumpectomy; Coronary angioplasty with stent; Femoral-femoral Bypass Graft; bone marrow biopsy (11/29/2017); and other surgical history (11/14/2018). Medications:   Scheduled Meds:   Continuous Infusions:    dextrose 5 % and 0.45 % NaCl 100 mL/hr at 04/23/19 1051     PRN Meds:   Home Meds:   Prior to Admission medications    Medication Sig Start Date End Date Taking?  Authorizing Provider   doxycycline hyclate (VIBRAMYCIN) 100 MG capsule Take 100 mg by mouth 2 times daily 4/10/19 4/24/19 Yes Historical Provider, MD   conjugated estrogens (PREMARIN) 0.625 MG/GM vaginal cream Place 0.5 g vaginally daily Place vaginally daily. 4/5/19  Yes Wesley Dodson MD   ALPRAZolam Darlen Lobe) 0.5 MG tablet Take 0.5 mg by mouth 3 times daily. .   Yes Historical Provider, MD   amLODIPine (NORVASC) 10 MG tablet Take 10 mg by mouth daily   Yes Historical Provider, MD   ferrous sulfate 325 (65 Fe) MG tablet Take 325 mg by mouth 2 times daily   Yes Historical Provider, MD   guaiFENesin (MUCINEX) 600 MG extended release tablet Take 1,200 mg by mouth 2 times daily   Yes Historical Provider, MD   senna (SENOKOT) 8.6 MG tablet Take 1 tablet by mouth daily   Yes Historical Provider, MD   tiotropium (SPIRIVA RESPIMAT) 2.5 MCG/ACT AERS inhaler Inhale 2 puffs into the lungs daily   Yes Historical Provider, MD   traZODone (DESYREL) 50 MG tablet Take 50 mg by mouth nightly   Yes Historical Provider, MD   mirtazapine (REMERON) 15 MG tablet Take 15 mg by mouth nightly   Yes Historical Provider, MD   apixaban (ELIQUIS) 5 MG TABS tablet Take 1 tablet by mouth 2 times daily 8/28/18  Yes Rowan Navarrete MD   mometasone-formoterol (DULERA) 200-5 MCG/ACT inhaler Inhale 2 puffs into the lungs 2 times daily   Yes Historical Provider, MD   polyethylene glycol (MIRALAX) powder Take 17 g by mouth daily as needed    Yes Historical Provider, MD   budesonide (PULMICORT) 0.5 MG/2ML nebulizer suspension Take 1 ampule by nebulization every 12 hours as needed (COPD)   Yes Historical Provider, MD   ipratropium (ATROVENT) 0.02 % nebulizer solution Take 0.5 mg by nebulization 4 times daily as needed (wheezing or SOB)   Yes Historical Provider, MD   aspirin 81 MG chewable tablet Take 81 mg by mouth daily   Yes Historical Provider, MD   vitamin B-12 (CYANOCOBALAMIN) 500 MCG tablet Take 500 mcg by mouth daily   Yes Historical Provider, MD   Fluticasone Furoate-Vilanterol (BREO ELLIPTA) 200-25 MCG/INH AEPB Inhale 1 puff into the lungs daily    Historical Provider, MD   calcium carbonate-vitamin D3 (CALCIUM 600-D) 600-400 MG-UNIT TABS Take 1 tablet by mouth daily    Historical Provider, MD   HYDROcodone-acetaminophen (NORCO) 5-325 MG per tablet Take 1 tablet by mouth every 6 hours as needed for Pain. Binh Mejia Historical Provider, MD   carbidopa-levodopa (SINEMET)  MG per tablet Take 2 tablets by mouth 4 times daily 8 am , 12 pm, 4 pm, 8 pm 2/4/19   RAHEEL Menard - CNP   atorvastatin (LIPITOR) 40 MG tablet Take 1 tablet by mouth nightly 10/25/18   Jesus Vaughan DO   clopidogrel (PLAVIX) 75 MG tablet Take 1 tablet by mouth daily 8/29/18   Silvano Jones MD   pantoprazole (PROTONIX) 40 MG tablet Take 40 mg by mouth daily    Historical Provider, MD   levothyroxine (SYNTHROID) 25 MCG tablet Take 25 mcg by mouth Daily    Historical Provider, MD   DULOXETINE HCL PO Take 90 mg by mouth daily Takes 30mg + 60mg caps (=90mg) once daily    Historical Provider, MD   carvedilol (COREG) 6.25 MG tablet Take 6.25 mg by mouth 2 times daily (with meals)    Historical Provider, MD   gabapentin (NEURONTIN) 300 MG capsule Take 300 mg by mouth 2 times daily . Historical Provider, MD   isosorbide mononitrate (IMDUR) 60 MG CR tablet Take 1 tablet by mouth daily. 3/25/15   Chantel Beckham MD   nitroGLYCERIN (NITROSTAT) 0.4 MG SL tablet Place 1 tablet under the tongue every 5 minutes as needed for Chest pain. 12/18/13   Javier Ruelas MD     Coumadin Use Last 7 Days:  no  Antiplatelet drug therapy use last 7 days: no  Other anticoagulant use last 7 days: no  Additional Medication Information:        Pre-Sedation Documentation and Exam:   I have personally completed a history, physical exam & review of systems for this patient (see notes).     Mallampati Airway Assessment:  Mallampati Class II - (soft palate, fauces & uvula are visible)    Prior History of Anesthesia Complications:   none    ASA Classification:  Class 3 - A patient with severe systemic disease that limits activity but is not incapacitating    Sedation/ Anesthesia Plan:   intravenous sedation    Medications Planned:   midazolam (Versed) intravenously and fentanyl intravenously    Patient is an appropriate candidate for plan of sedation: yes    Electronically signed by Ariel Riggs MD on 4/23/2019 at 3:38 PM

## 2019-04-23 NOTE — H&P
 Iron deficiency     Lymphoma ([facility] 1993    MDRO (multiple drug resistant organisms) resistance 8/10/2014    E. Coli urine    MRSA (methicillin resistant staph aureus) culture positive 07/13/2016    nares    Neuropathy     Osteoarthritis     Parkinson's disease ([facility]     Peripheral vascular disease ([facility]     Restless leg syndrome     Sleep apnea     Transient cerebral ischemia     Unsteady gait         Past Surgical History:     Past Surgical History:   Procedure Laterality Date    BONE MARROW BIOPSY  11/29/2017    BREAST LUMPECTOMY      right     CHOLECYSTECTOMY      CORONARY ANGIOPLASTY WITH STENT PLACEMENT      unknown heart stents-1.5T MRI     FEMORAL-FEMORAL BYPASS GRAFT      HYSTERECTOMY      LUNG REMOVAL, PARTIAL      Lung CA     OTHER SURGICAL HISTORY  11/14/2018    attempted angiogram by Dr. Jeanne Fu. Unable to perform due to occlusion        Medications Prior to Admission:     Prior to Admission medications    Medication Sig Start Date End Date Taking? Authorizing Provider   doxycycline hyclate (VIBRAMYCIN) 100 MG capsule Take 100 mg by mouth 2 times daily 4/10/19 4/24/19 Yes Historical Provider, MD   conjugated estrogens (PREMARIN) 0.625 MG/GM vaginal cream Place 0.5 g vaginally daily Place vaginally daily. 4/5/19  Yes Ivana Serrano MD   ALPRAZolam Chente Kaska) 0.5 MG tablet Take 0.5 mg by mouth 3 times daily. .   Yes Historical Provider, MD   amLODIPine (NORVASC) 10 MG tablet Take 10 mg by mouth daily   Yes Historical Provider, MD   ferrous sulfate 325 (65 Fe) MG tablet Take 325 mg by mouth 2 times daily   Yes Historical Provider, MD   guaiFENesin (MUCINEX) 600 MG extended release tablet Take 1,200 mg by mouth 2 times daily   Yes Historical Provider, MD   senna (SENOKOT) 8.6 MG tablet Take 1 tablet by mouth daily   Yes Historical Provider, MD   tiotropium (SPIRIVA RESPIMAT) 2.5 MCG/ACT AERS inhaler Inhale 2 puffs into the lungs daily   Yes Historical Provider, MD   traZODone (DESYREL) 50 MG drinking, increase in urination, hot or cold intolerance  MUSCULOSKELETAL:  negative joint pains, muscle aches, swelling of joints  NEUROLOGICAL:  Unsteady gait and dizziness at times. negative for headaches,  lightheadedness  BEHAVIOR/PSYCH:  negative for depression, anxiety    Physical Exam:   BP (!) 103/41   Pulse 84   Temp 98.6 °F (37 °C) (Oral)   Resp 20   Ht 4' 11\" (1.499 m)   Wt 118 lb (53.5 kg)   SpO2 92%   BMI 23.83 kg/m²   No LMP recorded. Patient has had a hysterectomy. General Appearance:  alert, well appearing, and in no acute distress  Mental status: oriented to person, place, and time with normal affect  Head:  normocephalic, atraumatic. Eye: no icterus, redness, pupils equal and reactive, extraocular eye movements intact, conjunctiva clear  Ear: normal external ear, no discharge, hearing intact  Nose:  no drainage noted  Mouth: mucous membranes moist  Neck: supple, no carotid bruits, thyroid not palpable  Lungs: Diminished lung sounds throughout and clear to ausculation without wheezing, rales or rhonchi. normal effort  Cardiovascular: Distant heart tones, normal rate, regular rhythm, no murmur, gallop, rub. Abdomen: Soft, nontender, nondistended, normal bowel sounds, no hepatomegaly or splenomegaly  Neurologic: There are no new focal motor or sensory deficits, normal muscle tone and bulk, no abnormal sensation, normal speech, cranial nerves II through XII grossly intact  Skin: No gross lesions, rashes, bruising or bleeding on exposed skin area  Extremities: 3+ right pedal and leg edema. Pedal pulses obtained with doppler except for posterior tibial pulse right foot due to dressing in place.   No calf pain with palpation  Psych: normal affect       Recent Labs     04/19/19  0525  04/01/19  0525   HGB 8.0*   < > 8.9*   HCT 26.3*   < > 28.9*   WBC 4.3   < > 2.8*   .9*   < > 112.0*   *   < > 144   K 3.8   < > 3.7   *   < > 109*   CO2 24   < > 26   BUN 19   < > 13 CREATININE 0.77   < > 0.86   GLUCOSE 86   < > 82   AST  --   --  16   ALT  --   --  <5*   LABALBU  --   --  2.5*    < > = values in this interval not displayed.            Diagnosis:      Possible renal artery stenosis      RAHEEL Priest - CNP  4/23/2019  10:40 AM

## 2019-04-24 NOTE — OP NOTE
[id number] [facility]                [address] [place] [address]                                OPERATIVE REPORT    PATIENT NAME: Madina Dallas                  :        1940  MED REC NO:   [id number]                             ROOM:  ACCOUNT NO:   [de-identified]                           ADMIT DATE: 2019  PROVIDER:     Mike Kilpatrick    DATE OF PROCEDURE:  2019    PREOPERATIVE DIAGNOSIS:  Severe 80% right renal artery stenosis. POSTOPERATIVE DIAGNOSIS:  Severe 80% right renal artery stenosis. PROCEDURES PERFORMED:  1. Selective right renal angiography. 2.  Primary stenting of right renal artery with 5 mm x 15 mm Express  Monorail stent. 3.  Ultrasound-guided access left brachial artery. 4.  Conscious sedation. SURGEON:  Dr. Casie Yusuf. Thee    ANESTHESIA:  Local IV sedation. ESTIMATED BLOOD LOSS:  Minimal.    COMPLICATIONS:  None. INDICATIONS:  The patient is a 77-year-old female who presents with  mesenteric and renal artery stenosis. She previously underwent superior  mesenteric artery angioplasty and stenting without incident, and was  noted to have renal artery stenosis on the right. Renal artery  ultrasound demonstrates significant stenosis in the right renal artery. At this time, she is being taken to the Angiography Suite for further  evaluation and therapy. OPERATIVE TECHNIQUE:  After informed consent had been obtained, the  patient was brought to the Angiography Suite, placed in the supine  position, and the left arm was prepped and draped in usual sterile  fashion. Left brachial artery was then isolated using a micropuncture  technique. A 5-Spanish sheath was placed. A 4-Spanish SOS catheter was  then advanced through the descending thoracic aorta and a stiff  guidewire was placed. The sheath was then exchanged for a 5-Spanish  [place] [employer]ination sheath.   The each one catheters placed in the  origin of the right renal artery was engaged and selective right renal  angiography was performed. This confirmed a high-grade 80% stenosis at  the origin of the right renal artery with the right renal artery  originating anterior. A 0.018 [address] guidewire was then advanced into  the right renal artery and the patient was intravenously heparinized  with 4000 units of aqueous heparin. A 5 mm x 15 mm Express Monorail  stent was then advanced over the guidewire and positioned in the right  renal artery landing just proximal to the bifurcation point and small  portion was then extending into the native aorta. The stent was then  deployed at 10 atmospheres and the balloon was withdrawn. Omni Flush  catheter was replaced and completion angiography was performed, which  demonstrated complete resolution of the previous stenosis with excellent  flow into the right kidney. There was an area of stenosis within the  mid to distal lower branch, however, appeared calcified and this area  was not engaged. The catheters and guidewires were then withdrawn and  20 mg of protamine was given intravenously in order to reverse the  remaining heparin. Sheath was removed and manual pressure applied at  the antecubital fossa followed by a sterile dressing. The patient  tolerated the procedure well and there were no immediate complications. IMPRESSION:  1. An 80% proximal right renal artery stenosis with complete resolution  post primary stenting. 2.  Moderate mid distal stenosis within a segmental branch of the right  renal system. 3.  No evidence of significant left renal artery stenosis.         Jennie Vasquez    D: 04/23/2019 15:54:36       T: 04/24/2019 2:34:38     [email or web address]  Job#: [id number]     Doc#: [id number]    CC:  Jose Lyon

## 2019-04-25 ENCOUNTER — HOSPITAL ENCOUNTER (OUTPATIENT)
Age: 79
Setting detail: SPECIMEN
Discharge: HOME | End: 2019-04-25
Payer: MEDICARE

## 2019-04-25 LAB
ANION GAP SERPL CALCULATED.3IONS-SCNC: 8 MMOL/L (ref 9–17)
BUN BLDV-MCNC: 11 MG/DL (ref 8–23)
BUN/CREAT BLD: ABNORMAL (ref 9–20)
CALCIUM SERPL-MCNC: 7.6 MG/DL (ref 8.6–10.4)
CHLORIDE BLD-SCNC: 114 MMOL/L (ref 98–107)
CO2: 23 MMOL/L (ref 20–31)
CREAT SERPL-MCNC: 0.7 MG/DL (ref 0.5–0.9)
GFR AFRICAN AMERICAN: >60 ML/MIN
GFR NON-AFRICAN AMERICAN: >60 ML/MIN
GFR SERPL CREATININE-BSD FRML MDRD: ABNORMAL ML/MIN/{1.73_M2}
GFR SERPL CREATININE-BSD FRML MDRD: ABNORMAL ML/MIN/{1.73_M2}
GLUCOSE BLD-MCNC: 85 MG/DL (ref 70–99)
HCT VFR BLD CALC: 26.1 % (ref 36.3–47.1)
HEMOGLOBIN: 7.6 G/DL (ref 11.9–15.1)
MCH RBC QN AUTO: 34.7 PG (ref 25.2–33.5)
MCHC RBC AUTO-ENTMCNC: 29.1 G/DL (ref 28.4–34.8)
MCV RBC AUTO: 119.2 FL (ref 82.6–102.9)
NRBC AUTOMATED: 0 PER 100 WBC
PDW BLD-RTO: 16.9 % (ref 11.8–14.4)
PLATELET # BLD: 74 K/UL (ref 138–453)
PMV BLD AUTO: 12 FL (ref 8.1–13.5)
POTASSIUM SERPL-SCNC: 4.2 MMOL/L (ref 3.7–5.3)
RBC # BLD: 2.19 M/UL (ref 3.95–5.11)
SODIUM BLD-SCNC: 145 MMOL/L (ref 135–144)
WBC # BLD: 2.3 K/UL (ref 3.5–11.3)

## 2019-04-25 PROCEDURE — 36415 COLL VENOUS BLD VENIPUNCTURE: CPT

## 2019-04-25 PROCEDURE — P9603 ONE-WAY ALLOW PRORATED MILES: HCPCS

## 2019-04-25 PROCEDURE — 85027 COMPLETE CBC AUTOMATED: CPT

## 2019-04-25 PROCEDURE — 80048 BASIC METABOLIC PNL TOTAL CA: CPT

## 2019-04-29 ENCOUNTER — HOSPITAL ENCOUNTER (OUTPATIENT)
Age: 79
Setting detail: SPECIMEN
Discharge: HOME | End: 2019-04-29
Payer: MEDICARE

## 2019-04-29 LAB
ANION GAP SERPL CALCULATED.3IONS-SCNC: 11 MMOL/L (ref 9–17)
BUN BLDV-MCNC: 15 MG/DL (ref 8–23)
BUN/CREAT BLD: 20 (ref 9–20)
CALCIUM SERPL-MCNC: 7.8 MG/DL (ref 8.6–10.4)
CHLORIDE BLD-SCNC: 108 MMOL/L (ref 98–107)
CO2: 23 MMOL/L (ref 20–31)
CREAT SERPL-MCNC: 0.75 MG/DL (ref 0.5–0.9)
GFR AFRICAN AMERICAN: >60 ML/MIN
GFR NON-AFRICAN AMERICAN: >60 ML/MIN
GFR SERPL CREATININE-BSD FRML MDRD: ABNORMAL ML/MIN/{1.73_M2}
GFR SERPL CREATININE-BSD FRML MDRD: ABNORMAL ML/MIN/{1.73_M2}
GLUCOSE BLD-MCNC: 71 MG/DL (ref 70–99)
HCT VFR BLD CALC: 27 % (ref 36–46)
HEMOGLOBIN: 8.9 G/DL (ref 12–16)
MCH RBC QN AUTO: 34.3 PG (ref 26–34)
MCHC RBC AUTO-ENTMCNC: 32.8 G/DL (ref 31–37)
MCV RBC AUTO: 104.6 FL (ref 80–100)
NRBC AUTOMATED: ABNORMAL PER 100 WBC
PDW BLD-RTO: 17.8 % (ref 11.5–14.5)
PLATELET # BLD: 122 K/UL (ref 130–400)
PMV BLD AUTO: 9.1 FL (ref 6–12)
POTASSIUM SERPL-SCNC: 4.2 MMOL/L (ref 3.7–5.3)
RBC # BLD: 2.58 M/UL (ref 4–5.2)
SODIUM BLD-SCNC: 142 MMOL/L (ref 135–144)
WBC # BLD: 3.8 K/UL (ref 3.5–11)

## 2019-04-29 PROCEDURE — 36415 COLL VENOUS BLD VENIPUNCTURE: CPT

## 2019-04-29 PROCEDURE — 85027 COMPLETE CBC AUTOMATED: CPT

## 2019-04-29 PROCEDURE — 80048 BASIC METABOLIC PNL TOTAL CA: CPT

## 2019-04-29 PROCEDURE — P9603 ONE-WAY ALLOW PRORATED MILES: HCPCS

## 2019-04-30 ENCOUNTER — APPOINTMENT (OUTPATIENT)
Dept: GENERAL RADIOLOGY | Age: 79
End: 2019-04-30
Payer: MEDICARE

## 2019-04-30 ENCOUNTER — HOSPITAL ENCOUNTER (OUTPATIENT)
Age: 79
Setting detail: OBSERVATION
Discharge: HOSPICE | End: 2019-05-01
Attending: EMERGENCY MEDICINE | Admitting: FAMILY MEDICINE
Payer: MEDICARE

## 2019-04-30 ENCOUNTER — APPOINTMENT (OUTPATIENT)
Dept: CT IMAGING | Age: 79
End: 2019-04-30
Payer: MEDICARE

## 2019-04-30 DIAGNOSIS — J96.22 ACUTE ON CHRONIC RESPIRATORY FAILURE WITH HYPERCAPNIA (HCC): ICD-10-CM

## 2019-04-30 DIAGNOSIS — J44.1 COPD EXACERBATION (HCC): Primary | ICD-10-CM

## 2019-04-30 LAB
ABSOLUTE EOS #: 0.04 K/UL (ref 0–0.4)
ABSOLUTE IMMATURE GRANULOCYTE: ABNORMAL K/UL (ref 0–0.3)
ABSOLUTE LYMPH #: 0.58 K/UL (ref 1–4.8)
ABSOLUTE MONO #: 0.22 K/UL (ref 0.2–0.8)
ANION GAP SERPL CALCULATED.3IONS-SCNC: 8 MMOL/L (ref 9–17)
BASOPHILS # BLD: 0 %
BASOPHILS ABSOLUTE: 0 K/UL (ref 0–0.2)
BNP INTERPRETATION: ABNORMAL
BUN BLDV-MCNC: 16 MG/DL (ref 8–23)
BUN/CREAT BLD: 19 (ref 9–20)
CALCIUM SERPL-MCNC: 7.5 MG/DL (ref 8.6–10.4)
CHLORIDE BLD-SCNC: 108 MMOL/L (ref 98–107)
CO2: 27 MMOL/L (ref 20–31)
CREAT SERPL-MCNC: 0.86 MG/DL (ref 0.5–0.9)
DIFFERENTIAL TYPE: ABNORMAL
EOSINOPHILS RELATIVE PERCENT: 1 % (ref 1–4)
FIO2: 30
GFR AFRICAN AMERICAN: >60 ML/MIN
GFR NON-AFRICAN AMERICAN: >60 ML/MIN
GFR SERPL CREATININE-BSD FRML MDRD: ABNORMAL ML/MIN/{1.73_M2}
GFR SERPL CREATININE-BSD FRML MDRD: ABNORMAL ML/MIN/{1.73_M2}
GLUCOSE BLD-MCNC: 71 MG/DL (ref 70–99)
HCT VFR BLD CALC: 26.6 % (ref 36.3–47.1)
HEMOGLOBIN: 8 G/DL (ref 11.9–15.1)
IMMATURE GRANULOCYTES: ABNORMAL %
LYMPHOCYTES # BLD: 16 % (ref 24–44)
MCH RBC QN AUTO: 34 PG (ref 25.2–33.5)
MCHC RBC AUTO-ENTMCNC: 30.1 G/DL (ref 28.4–34.8)
MCV RBC AUTO: 113.2 FL (ref 82.6–102.9)
MONOCYTES # BLD: 6 % (ref 1–7)
MORPHOLOGY: ABNORMAL
MORPHOLOGY: ABNORMAL
MYOGLOBIN: 1258 NG/ML (ref 25–58)
NEGATIVE BASE EXCESS, ART: 1 (ref 0–2)
NRBC AUTOMATED: 0.8 PER 100 WBC
NUCLEATED RED BLOOD CELLS: 3 PER 100 WBC
O2 DEVICE/FLOW/%: ABNORMAL
PATIENT TEMP: 37
PDW BLD-RTO: 17.3 % (ref 11.8–14.4)
PLATELET # BLD: 106 K/UL (ref 138–453)
PLATELET ESTIMATE: ABNORMAL
PMV BLD AUTO: 10.6 FL (ref 8.1–13.5)
POC HCO3: 26.1 MMOL/L (ref 22–27)
POC O2 SATURATION: 93 %
POC PCO2 TEMP: ABNORMAL MM HG
POC PCO2: 55 MM HG (ref 32–45)
POC PH TEMP: ABNORMAL
POC PH: 7.29 (ref 7.35–7.45)
POC PO2 TEMP: ABNORMAL MM HG
POC PO2: 75 MM HG (ref 75–95)
POSITIVE BASE EXCESS, ART: ABNORMAL (ref 0–2)
POTASSIUM SERPL-SCNC: 4 MMOL/L (ref 3.7–5.3)
PRO-BNP: 1051 PG/ML
RBC # BLD: 2.35 M/UL (ref 3.95–5.11)
RBC # BLD: ABNORMAL 10*6/UL
SEG NEUTROPHILS: 77 % (ref 36–66)
SEGMENTED NEUTROPHILS ABSOLUTE COUNT: 2.76 K/UL (ref 1.8–7.7)
SODIUM BLD-SCNC: 143 MMOL/L (ref 135–144)
TCO2 (CALC), ART: 28 MMOL/L (ref 23–28)
TROPONIN INTERP: ABNORMAL
TROPONIN T: ABNORMAL NG/ML
TROPONIN, HIGH SENSITIVITY: 41 NG/L (ref 0–14)
WBC # BLD: 3.6 K/UL (ref 3.5–11.3)
WBC # BLD: ABNORMAL 10*3/UL

## 2019-04-30 PROCEDURE — 83874 ASSAY OF MYOGLOBIN: CPT

## 2019-04-30 PROCEDURE — 83605 ASSAY OF LACTIC ACID: CPT

## 2019-04-30 PROCEDURE — 87040 BLOOD CULTURE FOR BACTERIA: CPT

## 2019-04-30 PROCEDURE — 93005 ELECTROCARDIOGRAM TRACING: CPT

## 2019-04-30 PROCEDURE — 94640 AIRWAY INHALATION TREATMENT: CPT

## 2019-04-30 PROCEDURE — 85025 COMPLETE CBC W/AUTO DIFF WBC: CPT

## 2019-04-30 PROCEDURE — 36600 WITHDRAWAL OF ARTERIAL BLOOD: CPT

## 2019-04-30 PROCEDURE — 80048 BASIC METABOLIC PNL TOTAL CA: CPT

## 2019-04-30 PROCEDURE — 70450 CT HEAD/BRAIN W/O DYE: CPT

## 2019-04-30 PROCEDURE — 99285 EMERGENCY DEPT VISIT HI MDM: CPT

## 2019-04-30 PROCEDURE — 84484 ASSAY OF TROPONIN QUANT: CPT

## 2019-04-30 PROCEDURE — 71045 X-RAY EXAM CHEST 1 VIEW: CPT

## 2019-04-30 PROCEDURE — 1200000000 HC SEMI PRIVATE

## 2019-04-30 PROCEDURE — 6360000002 HC RX W HCPCS: Performed by: NURSE PRACTITIONER

## 2019-04-30 PROCEDURE — 96361 HYDRATE IV INFUSION ADD-ON: CPT

## 2019-04-30 PROCEDURE — 6370000000 HC RX 637 (ALT 250 FOR IP): Performed by: FAMILY MEDICINE

## 2019-04-30 PROCEDURE — 96374 THER/PROPH/DIAG INJ IV PUSH: CPT

## 2019-04-30 PROCEDURE — 82803 BLOOD GASES ANY COMBINATION: CPT

## 2019-04-30 PROCEDURE — 83880 ASSAY OF NATRIURETIC PEPTIDE: CPT

## 2019-04-30 PROCEDURE — 2700000000 HC OXYGEN THERAPY PER DAY

## 2019-04-30 PROCEDURE — 6360000002 HC RX W HCPCS: Performed by: EMERGENCY MEDICINE

## 2019-04-30 PROCEDURE — 2580000003 HC RX 258: Performed by: NURSE PRACTITIONER

## 2019-04-30 RX ORDER — ACETAMINOPHEN 325 MG/1
650 TABLET ORAL EVERY 6 HOURS PRN
COMMUNITY

## 2019-04-30 RX ORDER — SODIUM CHLORIDE 0.9 % (FLUSH) 0.9 %
10 SYRINGE (ML) INJECTION PRN
Status: DISCONTINUED | OUTPATIENT
Start: 2019-04-30 | End: 2019-04-30 | Stop reason: SDUPTHER

## 2019-04-30 RX ORDER — ATORVASTATIN CALCIUM 40 MG/1
40 TABLET, FILM COATED ORAL NIGHTLY
Status: DISCONTINUED | OUTPATIENT
Start: 2019-04-30 | End: 2019-05-01 | Stop reason: HOSPADM

## 2019-04-30 RX ORDER — FUROSEMIDE 20 MG/1
20 TABLET ORAL DAILY
COMMUNITY

## 2019-04-30 RX ORDER — METHYLPREDNISOLONE SODIUM SUCCINATE 125 MG/2ML
125 INJECTION, POWDER, LYOPHILIZED, FOR SOLUTION INTRAMUSCULAR; INTRAVENOUS ONCE
Status: COMPLETED | OUTPATIENT
Start: 2019-04-30 | End: 2019-04-30

## 2019-04-30 RX ORDER — LEVOTHYROXINE SODIUM 0.03 MG/1
25 TABLET ORAL DAILY
Status: DISCONTINUED | OUTPATIENT
Start: 2019-05-01 | End: 2019-05-01 | Stop reason: HOSPADM

## 2019-04-30 RX ORDER — 0.9 % SODIUM CHLORIDE 0.9 %
500 INTRAVENOUS SOLUTION INTRAVENOUS ONCE
Status: COMPLETED | OUTPATIENT
Start: 2019-04-30 | End: 2019-04-30

## 2019-04-30 RX ORDER — LEVALBUTEROL 1.25 MG/.5ML
1.25 SOLUTION, CONCENTRATE RESPIRATORY (INHALATION) ONCE
Status: COMPLETED | OUTPATIENT
Start: 2019-04-30 | End: 2019-04-30

## 2019-04-30 RX ORDER — SODIUM CHLORIDE FOR INHALATION 0.9 %
3 VIAL, NEBULIZER (ML) INHALATION
Status: DISCONTINUED | OUTPATIENT
Start: 2019-04-30 | End: 2019-05-01 | Stop reason: HOSPADM

## 2019-04-30 RX ORDER — LANOLIN ALCOHOL/MO/W.PET/CERES
325 CREAM (GRAM) TOPICAL 3 TIMES DAILY
Status: DISCONTINUED | OUTPATIENT
Start: 2019-04-30 | End: 2019-05-01 | Stop reason: HOSPADM

## 2019-04-30 RX ORDER — CLOPIDOGREL BISULFATE 75 MG/1
75 TABLET ORAL DAILY
Status: DISCONTINUED | OUTPATIENT
Start: 2019-04-30 | End: 2019-05-01 | Stop reason: HOSPADM

## 2019-04-30 RX ORDER — ALBUTEROL SULFATE 1.25 MG/3ML
1.25 SOLUTION RESPIRATORY (INHALATION) EVERY 6 HOURS PRN
Status: DISCONTINUED | OUTPATIENT
Start: 2019-04-30 | End: 2019-04-30

## 2019-04-30 RX ORDER — TRAZODONE HYDROCHLORIDE 50 MG/1
50 TABLET ORAL NIGHTLY
Status: DISCONTINUED | OUTPATIENT
Start: 2019-04-30 | End: 2019-05-01 | Stop reason: HOSPADM

## 2019-04-30 RX ORDER — SODIUM CHLORIDE 0.9 % (FLUSH) 0.9 %
10 SYRINGE (ML) INJECTION EVERY 12 HOURS SCHEDULED
Status: DISCONTINUED | OUTPATIENT
Start: 2019-04-30 | End: 2019-04-30 | Stop reason: SDUPTHER

## 2019-04-30 RX ORDER — SODIUM CHLORIDE FOR INHALATION 0.9 %
3 VIAL, NEBULIZER (ML) INHALATION EVERY 8 HOURS PRN
Status: DISCONTINUED | OUTPATIENT
Start: 2019-04-30 | End: 2019-05-01 | Stop reason: HOSPADM

## 2019-04-30 RX ORDER — SODIUM CHLORIDE 0.9 % (FLUSH) 0.9 %
10 SYRINGE (ML) INJECTION PRN
Status: DISCONTINUED | OUTPATIENT
Start: 2019-04-30 | End: 2019-05-01 | Stop reason: HOSPADM

## 2019-04-30 RX ORDER — GABAPENTIN 300 MG/1
300 CAPSULE ORAL 2 TIMES DAILY
Status: DISCONTINUED | OUTPATIENT
Start: 2019-04-30 | End: 2019-05-01 | Stop reason: HOSPADM

## 2019-04-30 RX ORDER — MIRTAZAPINE 15 MG/1
15 TABLET, FILM COATED ORAL NIGHTLY
Status: DISCONTINUED | OUTPATIENT
Start: 2019-04-30 | End: 2019-05-01 | Stop reason: HOSPADM

## 2019-04-30 RX ORDER — HYDROCODONE BITARTRATE AND ACETAMINOPHEN 5; 325 MG/1; MG/1
1 TABLET ORAL EVERY 4 HOURS PRN
Status: DISCONTINUED | OUTPATIENT
Start: 2019-04-30 | End: 2019-05-01 | Stop reason: HOSPADM

## 2019-04-30 RX ORDER — PANTOPRAZOLE SODIUM 40 MG/1
40 TABLET, DELAYED RELEASE ORAL
Status: DISCONTINUED | OUTPATIENT
Start: 2019-05-01 | End: 2019-05-01 | Stop reason: HOSPADM

## 2019-04-30 RX ORDER — NITROGLYCERIN 0.4 MG/1
0.4 TABLET SUBLINGUAL EVERY 5 MIN PRN
Status: DISCONTINUED | OUTPATIENT
Start: 2019-04-30 | End: 2019-05-01 | Stop reason: HOSPADM

## 2019-04-30 RX ORDER — BUDESONIDE 0.5 MG/2ML
500 INHALANT ORAL EVERY 12 HOURS PRN
Status: DISCONTINUED | OUTPATIENT
Start: 2019-04-30 | End: 2019-05-01 | Stop reason: HOSPADM

## 2019-04-30 RX ORDER — POLYETHYLENE GLYCOL 3350 17 G/17G
17 POWDER, FOR SOLUTION ORAL DAILY
Status: DISCONTINUED | OUTPATIENT
Start: 2019-04-30 | End: 2019-05-01 | Stop reason: HOSPADM

## 2019-04-30 RX ORDER — LEVALBUTEROL 1.25 MG/.5ML
1.25 SOLUTION, CONCENTRATE RESPIRATORY (INHALATION)
Status: DISCONTINUED | OUTPATIENT
Start: 2019-04-30 | End: 2019-05-01 | Stop reason: HOSPADM

## 2019-04-30 RX ORDER — SODIUM CHLORIDE 0.9 % (FLUSH) 0.9 %
10 SYRINGE (ML) INJECTION EVERY 12 HOURS SCHEDULED
Status: DISCONTINUED | OUTPATIENT
Start: 2019-04-30 | End: 2019-05-01 | Stop reason: HOSPADM

## 2019-04-30 RX ORDER — ISOSORBIDE MONONITRATE 60 MG/1
60 TABLET, EXTENDED RELEASE ORAL DAILY
Status: DISCONTINUED | OUTPATIENT
Start: 2019-04-30 | End: 2019-05-01 | Stop reason: HOSPADM

## 2019-04-30 RX ORDER — FUROSEMIDE 20 MG/1
20 TABLET ORAL DAILY
Status: DISCONTINUED | OUTPATIENT
Start: 2019-04-30 | End: 2019-05-01 | Stop reason: HOSPADM

## 2019-04-30 RX ORDER — CARVEDILOL 3.12 MG/1
6.25 TABLET ORAL 2 TIMES DAILY WITH MEALS
Status: DISCONTINUED | OUTPATIENT
Start: 2019-04-30 | End: 2019-05-01 | Stop reason: HOSPADM

## 2019-04-30 RX ORDER — FLUTICASONE FUROATE AND VILANTEROL 200; 25 UG/1; UG/1
1 POWDER RESPIRATORY (INHALATION) DAILY
Status: DISCONTINUED | OUTPATIENT
Start: 2019-04-30 | End: 2019-04-30

## 2019-04-30 RX ORDER — UBIDECARENONE 75 MG
500 CAPSULE ORAL DAILY
Status: DISCONTINUED | OUTPATIENT
Start: 2019-05-01 | End: 2019-05-01 | Stop reason: HOSPADM

## 2019-04-30 RX ORDER — GUAIFENESIN 600 MG/1
1200 TABLET, EXTENDED RELEASE ORAL 2 TIMES DAILY
Status: DISCONTINUED | OUTPATIENT
Start: 2019-04-30 | End: 2019-05-01 | Stop reason: HOSPADM

## 2019-04-30 RX ORDER — ACETAMINOPHEN 325 MG/1
650 TABLET ORAL EVERY 4 HOURS PRN
Status: DISCONTINUED | OUTPATIENT
Start: 2019-04-30 | End: 2019-05-01 | Stop reason: HOSPADM

## 2019-04-30 RX ORDER — SODIUM CHLORIDE 9 MG/ML
INJECTION, SOLUTION INTRAVENOUS CONTINUOUS
Status: DISCONTINUED | OUTPATIENT
Start: 2019-05-01 | End: 2019-05-01 | Stop reason: HOSPADM

## 2019-04-30 RX ORDER — AMLODIPINE BESYLATE 5 MG/1
10 TABLET ORAL DAILY
Status: DISCONTINUED | OUTPATIENT
Start: 2019-04-30 | End: 2019-05-01 | Stop reason: HOSPADM

## 2019-04-30 RX ORDER — CALCIUM CARBONATE/VITAMIN D3 600 MG-10
1 TABLET ORAL DAILY
Status: DISCONTINUED | OUTPATIENT
Start: 2019-04-30 | End: 2019-05-01 | Stop reason: HOSPADM

## 2019-04-30 RX ORDER — ASPIRIN 81 MG/1
81 TABLET, CHEWABLE ORAL DAILY
Status: DISCONTINUED | OUTPATIENT
Start: 2019-04-30 | End: 2019-05-01 | Stop reason: HOSPADM

## 2019-04-30 RX ORDER — ALPRAZOLAM 0.25 MG/1
0.5 TABLET ORAL 3 TIMES DAILY
Status: DISCONTINUED | OUTPATIENT
Start: 2019-04-30 | End: 2019-05-01 | Stop reason: HOSPADM

## 2019-04-30 RX ADMIN — LEVALBUTEROL 1.25 MG: 1.25 SOLUTION, CONCENTRATE RESPIRATORY (INHALATION) at 17:13

## 2019-04-30 RX ADMIN — GUAIFENESIN 1200 MG: 600 TABLET, EXTENDED RELEASE ORAL at 23:13

## 2019-04-30 RX ADMIN — ATORVASTATIN CALCIUM 40 MG: 40 TABLET, FILM COATED ORAL at 23:12

## 2019-04-30 RX ADMIN — GABAPENTIN 300 MG: 300 CAPSULE ORAL at 23:13

## 2019-04-30 RX ADMIN — SODIUM CHLORIDE 500 ML: 0.9 INJECTION, SOLUTION INTRAVENOUS at 19:23

## 2019-04-30 RX ADMIN — APIXABAN 5 MG: 5 TABLET, FILM COATED ORAL at 23:13

## 2019-04-30 RX ADMIN — ALPRAZOLAM 0.5 MG: 0.25 TABLET ORAL at 23:13

## 2019-04-30 RX ADMIN — SODIUM CHLORIDE 500 ML: 9 INJECTION, SOLUTION INTRAVENOUS at 16:35

## 2019-04-30 RX ADMIN — METHYLPREDNISOLONE SODIUM SUCCINATE 125 MG: 125 INJECTION, POWDER, FOR SOLUTION INTRAMUSCULAR; INTRAVENOUS at 18:38

## 2019-04-30 RX ADMIN — FERROUS SULFATE TAB EC 325 MG (65 MG FE EQUIVALENT) 325 MG: 325 (65 FE) TABLET DELAYED RESPONSE at 23:13

## 2019-04-30 RX ADMIN — MIRTAZAPINE 15 MG: 15 TABLET, FILM COATED ORAL at 23:15

## 2019-04-30 RX ADMIN — TRAZODONE HYDROCHLORIDE 50 MG: 50 TABLET ORAL at 23:13

## 2019-04-30 RX ADMIN — CARBIDOPA AND LEVODOPA 2 TABLET: 25; 100 TABLET ORAL at 23:13

## 2019-04-30 ASSESSMENT — ENCOUNTER SYMPTOMS
SHORTNESS OF BREATH: 0
DIARRHEA: 0
SORE THROAT: 0
NAUSEA: 0
RHINORRHEA: 0
BACK PAIN: 0
CONSTIPATION: 0
VOMITING: 0
PHOTOPHOBIA: 0
COUGH: 0

## 2019-04-30 ASSESSMENT — PAIN SCALES - GENERAL: PAINLEVEL_OUTOF10: 10

## 2019-04-30 NOTE — ED PROVIDER NOTES
[facility] ED  EMERGENCY DEPARTMENT ENCOUNTER      Pt Name: Maury Mercedes  MRN: [id number]  [place] 1940  Date of evaluation: 4/30/2019  Provider: RAHEEL Yao [id number]       Chief Complaint   Patient presents with    Leg Pain         HISTORY OF PRESENT ILLNESS  (Location/Symptom, Timing/Onset, Context/Setting, Quality, Duration, Modifying Factors, Severity.)   Maury Mercedes is a 70 y.o. female who presents to the emergency department via squad with fatigue, decreased appetite, frequent falls and bilateral leg pain. She currently resides at the [facility] and is a [facility]. According to the work sent from her facility she has always been a fall risk but has been falling more frequently over the past week. Her \"labs\" have been declining and she is eating less. Patient complains of bilateral leg pain which is worse on the left. She is unsure if she injured her legs on one of her falls. 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 Nursing Notes were reviewed. ALLERGIES     Naprosyn [naproxen]; Acetate; Actonel [risedronate sodium]; Albuterol sulfate; Atenolol; Codeine; Cortisone; and Requip [ropinirole hcl]    CURRENT MEDICATIONS       Previous Medications    ACETAMINOPHEN (TYLENOL) 325 MG TABLET    Take 650 mg by mouth every 6 hours as needed for Pain    ALPRAZOLAM (XANAX) 0.5 MG TABLET    Take 0.5 mg by mouth 3 times daily. Sara January     AMLODIPINE (NORVASC) 10 MG TABLET    Take 10 mg by mouth daily    APIXABAN (ELIQUIS) 5 MG TABS TABLET    Take 1 tablet by mouth 2 times daily    ASPIRIN 81 MG CHEWABLE TABLET    Take 81 mg by mouth daily    ATORVASTATIN (LIPITOR) 40 MG TABLET    Take 1 tablet by mouth nightly    BUDESONIDE (PULMICORT) 0.5 MG/2ML NEBULIZER SUSPENSION    Take 1 ampule by nebulization every 12 hours as needed (COPD)    CALCIUM CARBONATE-VITAMIN D3 (CALCIUM 600-D) 600-400 MG-UNIT TABS    Take 1 tablet by mouth daily    CARBIDOPA-LEVODOPA (SINEMET)  MG PER TABLET    Take 2 tablets by mouth 4 times daily 8 am , 12 pm, 4 pm, 8 pm    CARVEDILOL (COREG) 6.25 MG TABLET    Take 6.25 mg by mouth 2 times daily (with meals)    CLOPIDOGREL (PLAVIX) 75 MG TABLET    Take 1 tablet by mouth daily    CONJUGATED ESTROGENS (PREMARIN) 0.625 MG/GM VAGINAL CREAM    Place 0.5 g vaginally daily Place vaginally daily. DULOXETINE HCL PO    Take 90 mg by mouth daily Takes 30mg + 60mg caps (=90mg) once daily    FERROUS SULFATE 325 (65 FE) MG TABLET    Take 325 mg by mouth 3 times daily     FLUTICASONE FUROATE-VILANTEROL (BREO ELLIPTA) 200-25 MCG/INH AEPB    Inhale 1 puff into the lungs daily    FUROSEMIDE (LASIX) 20 MG TABLET    Take 20 mg by mouth daily Indications: x 3 days starting 4/29/19    GABAPENTIN (NEURONTIN) 300 MG CAPSULE    Take 300 mg by mouth 2 times daily . GUAIFENESIN (MUCINEX) 600 MG EXTENDED RELEASE TABLET    Take 1,200 mg by mouth 2 times daily    HYDROCODONE-ACETAMINOPHEN (NORCO) 5-325 MG PER TABLET    Take 1 tablet by mouth every 6 hours as needed for Pain. Rocael Gu HYOSCYAMINE (LEVSIN/SL) 125 MCG SUBLINGUAL TABLET    Place 125 mcg under the tongue 2 times daily as needed for Cramping    IPRATROPIUM (ATROVENT) 0.02 % NEBULIZER SOLUTION    Take 0.5 mg by nebulization 4 times daily as needed (wheezing or SOB)    ISOSORBIDE MONONITRATE (IMDUR) 60 MG CR TABLET    Take 1 tablet by mouth daily. LEVOTHYROXINE (SYNTHROID) 25 MCG TABLET    Take 25 mcg by mouth Daily    MIRTAZAPINE (REMERON) 15 MG TABLET    Take 15 mg by mouth nightly    NITROGLYCERIN (NITROSTAT) 0.4 MG SL TABLET    Place 1 tablet under the tongue every 5 minutes as needed for Chest pain.     PANTOPRAZOLE (PROTONIX) 40 MG TABLET    Take 40 mg by mouth daily    POLYETHYLENE GLYCOL (MIRALAX) POWDER    Take 17 g by mouth daily as needed     TIOTROPIUM (SPIRIVA RESPIMAT) 2.5 MCG/ACT AERS INHALER    Inhale 2 puffs into the lungs daily    TRAZODONE (DESYREL) 50 MG TABLET    Take 50 mg by mouth nightly    VITAMIN B-12 (CYANOCOBALAMIN) 500 MCG TABLET    Take 500 mcg by mouth daily       PAST MEDICAL HISTORY         Diagnosis Date    Airway obstruction     Alcoholic polyneuropathy ([address]     Angina effort ([address]     Anxiety     Atrial fibrillation (HCC)     CAD (coronary artery disease)     s/p stents    Cancer (HCC)     breast, right    CHF (congestive heart failure) (HCC)     unspecified diastolic    Convulsions (HCC)     COPD (chronic obstructive pulmonary disease) (HCC)     Degenerative disc disease     Cervical    Depression     Diabetes mellitus ([address]     Esophageal reflux     Hypertension     unspecified    Inflammatory spondylopathy ([address]     Iron deficiency     Lymphoma ([address]     MDRO (multiple drug resistant organisms) resistance 8/10/2014    E. Coli urine    MRSA (methicillin resistant staph aureus) culture positive 2016    nares    Neuropathy     Osteoarthritis     Parkinson's disease (HCC)     Peripheral vascular disease ([address]     Restless leg syndrome     Sleep apnea     Transient cerebral ischemia     Unsteady gait        SURGICAL HISTORY           Procedure Laterality Date    BONE MARROW BIOPSY  2017    BREAST LUMPECTOMY      right     CHOLECYSTECTOMY      CORONARY ANGIOPLASTY WITH STENT PLACEMENT      unknown heart stents-1.5T MRI     FEMORAL-FEMORAL BYPASS GRAFT      HYSTERECTOMY      LUNG REMOVAL, PARTIAL      Lung CA     OTHER SURGICAL HISTORY  2018    attempted angiogram by Dr. Genevieve Montes.  Unable to perform due to occlusion    OTHER SURGICAL HISTORY Right 2019     IR ANGIO RENAL SUPERSELECT INCLUSIVE RIGHT         FAMILY HISTORY           Problem Relation Age of Onset    Kidney Disease Mother     Cancer Mother     Heart Disease Father     Coronary Art Dis Sister      Family Status   Relation Name Status    Mother      Father      Sister  (Not Specified)        SOCIAL HISTORY      reports that she has been smoking cigarettes. She has been smoking about 0.25 packs per day. She has never used smokeless tobacco. She reports that she does not drink alcohol or use drugs. REVIEW OF SYSTEMS    (2-9 systems for level 4, 10 or more for level 5)     Review of Systems   Constitutional: Positive for activity change and fatigue. Negative for fever. HENT: Negative for congestion, rhinorrhea and sore throat. Eyes: Negative for photophobia and visual disturbance. Respiratory: Negative for cough and shortness of breath. Cardiovascular: Negative for chest pain and palpitations. Gastrointestinal: Negative for constipation, diarrhea, nausea and vomiting. Genitourinary: Negative for dysuria and frequency. Musculoskeletal: Positive for arthralgias and gait problem. Negative for back pain. Neurological: Negative for dizziness, numbness and headaches. Generalized weakness        Except as noted above the remainder of the review of systems was reviewed and negative. PHYSICAL EXAM    (up to 7 for level 4, 8 or more for level 5)     Vitals:    04/30/19 1738 04/30/19 1753 04/30/19 1811 04/30/19 1826   BP: (!) 126/50 (!) 129/52 (!) 135/54 (!) 133/52   Pulse: 94 95 97 97   Resp: 13 13 13 12   Temp:       TempSrc:       SpO2: 93% 96% 90% 95%   Weight:             Physical Exam   Constitutional: She is oriented to person, place, and time. She appears well-developed. Frail appearing   HENT:   Head: Normocephalic and atraumatic. Mouth/Throat: Uvula is midline. Mucous membranes are dry. Eyes: Pupils are equal, round, and reactive to light. Conjunctivae and EOM are normal.   Neck: Normal range of motion. No spinous process tenderness present. Cardiovascular: Normal rate and regular rhythm. Exam reveals decreased pulses. Pulmonary/Chest: Effort normal and breath sounds normal. No respiratory distress. Abdominal: Soft. Bowel sounds are normal. She exhibits no distension. There is no tenderness. Musculoskeletal: Normal range of motion. She exhibits no edema, tenderness or deformity. Neurological: She is alert and oriented to person, place, and time. No cranial nerve deficit. Coordination normal.   Skin: Skin is dry. Bilateral feet are cool to touch. Left foot is dusky in color   Psychiatric: She has a normal mood and affect. Her behavior is normal.           DIAGNOSTIC RESULTS     EKG: All EKG's are interpreted by the Emergency Department Physician who either signs or Co-signs this chart in the absence of a cardiologist.    EKG was interpreted by Dr. Hathaway :   Non-plain film images such as CT, Ultrasound and MRI are read by the radiologist. Samreen Sneed radiographic images are visualized and preliminarily interpreted by the emergency physician with the below findings:    Interpretation per the Radiologist below, if available at the time of this note:    CT Head WO Contrast   Final Result   No acute intracranial abnormality. XR CHEST PORTABLE   Final Result   Left basilar opacity could be due to atelectasis with possible small   effusion. Developing pneumonia is a differential consideration.                LABS:  Labs Reviewed   CBC WITH AUTO DIFFERENTIAL - Abnormal; Notable for the following components:       Result Value    RBC 2.35 (*)     Hemoglobin 8.0 (*)     Hematocrit 26.6 (*)     .2 (*)     MCH 34.0 (*)     RDW 17.3 (*)     Platelets 601 (*)     NRBC Automated 0.8 (*)     Seg Neutrophils 77 (*)     Lymphocytes 16 (*)     nRBC 3 (*)     Absolute Lymph # 0.58 (*)     All other components within normal limits   BASIC METABOLIC PANEL - Abnormal; Notable for the following components:    Calcium 7.5 (*)     Chloride 108 (*)     Anion Gap 8 (*)     All other components within normal limits   TROP/MYOGLOBIN - Abnormal; Notable for the following components:    Troponin, High Sensitivity 41 (*)     Myoglobin 1,258 (*)     All other components within normal limits   BRAIN NATRIURETIC PEPTIDE - Abnormal; Notable for the following components:    Pro-BNP 1,051 (*)     All other components within normal limits   POC PANEL (G3)-ART - Abnormal; Notable for the following components:    POC pH 7.29 (*)     POC pCO2 55 (*)     All other components within normal limits   URINALYSIS   CBC WITH AUTO DIFFERENTIAL   BASIC METABOLIC PANEL W/ REFLEX TO MG FOR LOW K       All other labs were within normal range or not returned as of this dictation. EMERGENCY DEPARTMENT COURSE and DIFFERENTIAL DIAGNOSIS/MDM:   Vitals:    Vitals:    19 1738 19 1753 19 1811 19 1826   BP: (!) 126/50 (!) 129/52 (!) 135/54 (!) 133/52   Pulse: 94 95 97 97   Resp: 13 13 13 12   Temp:       TempSrc:       SpO2: 93% 96% 90% 95%   Weight:           Medical Decision Makin-year-old female who is afebrile and appears chronically ill. Vital signs are stable on arrival.  He had an episode in the ER when her oxygen saturation went down to 77 with a good pleth but returned to the high 90s before intervention. She did receive an aerosol treatment. Feet are also cool to touch and I was unable to obtain pulses with a Doppler. I was able to speak with her vascular surgeon who is aware of this chronic issue. She will be admitted for further evaluation and treatment. CONSULTS:  IP CONSULT TO HOSPITALIST  IP CONSULT TO VASCULAR SURGERY  IP CONSULT TO PULMONOLOGY    PROCEDURES:  None    FINAL IMPRESSION      1. COPD exacerbation ([facility]    2. Acute on chronic respiratory failure with hypercapnia [facility])          DISPOSITION/PLAN   DISPOSITION Admitted 2019 06:26:11 PM      PATIENT REFERRED TO:   DO [address]ngel [address]   [address] [address]  [phone]            DISCHARGE MEDICATIONS:     New Prescriptions    No medications on file         (Please note that portions of this note were completed with a voice recognition program.  Efforts were made to edit the dictations but occasionally words are mis-transcribed. )    SACHIN ROSENBAUM, APRN - CNP            Shawnee Garcia, [facility] - CNP  04/30/19 1944

## 2019-04-30 NOTE — PROGRESS NOTES
Transitions of Care Pharmacy Service   Medication Review    The patient's list of current home medications has been reviewed and updated. Source(s) of information: Med list from [facility]  Patient was recently prescribed Premarin cream but this is not on med list from [facility] yet. I left this on Epic med list as there is a possibility it may not be covered by insurance or needing prior authorization    Please feel free to call with any questions about this encounter. Thank you. Hilda Cruz, [facility]  Transitions of Care Pharmacy Service  Phone:  [phone]  Fax: [phone]      Prior to Admission medications    Medication Sig Start Date End Date Taking? Authorizing Provider   furosemide (LASIX) 20 MG tablet Take 20 mg by mouth daily Indications: x 3 days starting 4/29/19   Yes Historical Provider, MD   acetaminophen (TYLENOL) 325 MG tablet Take 650 mg by mouth every 6 hours as needed for Pain   Yes Historical Provider, MD   hyoscyamine (LEVSIN/SL) 125 MCG sublingual tablet Place 125 mcg under the tongue 2 times daily as needed for Cramping   Yes Historical Provider, MD   conjugated estrogens (PREMARIN) 0.625 MG/GM vaginal cream Place 0.5 g vaginally daily Place vaginally daily. 4/5/19   Lia Cardenas MD   Fluticasone Furoate-Vilanterol (BREO ELLIPTA) 200-25 MCG/INH AEPB Inhale 1 puff into the lungs daily    Historical Provider, MD   calcium carbonate-vitamin D3 (CALCIUM 600-D) 600-400 MG-UNIT TABS Take 1 tablet by mouth daily    Historical Provider, MD   ALPRAZolam [facility]) 0.5 MG tablet Take 0.5 mg by mouth 3 times daily. Olamide Ward     Historical Provider, MD   amLODIPine (NORVASC) 10 MG tablet Take 10 mg by mouth daily    Historical Provider, MD   ferrous sulfate 325 (65 Fe) MG tablet Take 325 mg by mouth 3 times daily     Historical Provider, MD   guaiFENesin (MUCINEX) 600 MG extended release tablet Take 1,200 mg by mouth 2 times daily    Historical Provider, MD   senna (SENOKOT) 8.6 MG tablet +docusate Take 1 gabapentin (NEURONTIN) 300 MG capsule Take 300 mg by mouth 2 times daily . Historical Provider, MD   isosorbide mononitrate (IMDUR) 60 MG CR tablet Take 1 tablet by mouth daily. 3/25/15   Shante Welch MD   nitroGLYCERIN (NITROSTAT) 0.4 MG SL tablet Place 1 tablet under the tongue every 5 minutes as needed for Chest pain.  12/18/13   Suzanna Fong MD

## 2019-04-30 NOTE — ED PROVIDER NOTES
Attending Supervisory Note/Shared Visit   I have personally performed a face to face diagnostic evaluation on this patient. I have reviewed the mid-levels findings and agree.         (Please note that portions of this note were completed with a voice recognition program.  Efforts were made to edit the dictations but occasionally words are mis-transcribed.)    Kamilla Cleveland MD  Attending Emergency Physician      Kamilla Cleveland MD  04/30/19 0012

## 2019-05-01 VITALS
DIASTOLIC BLOOD PRESSURE: 48 MMHG | SYSTOLIC BLOOD PRESSURE: 127 MMHG | HEART RATE: 96 BPM | WEIGHT: 121.7 LBS | HEIGHT: 60 IN | TEMPERATURE: 97.5 F | BODY MASS INDEX: 23.89 KG/M2 | RESPIRATION RATE: 16 BRPM | OXYGEN SATURATION: 98 %

## 2019-05-01 LAB
-: ABNORMAL
ABSOLUTE EOS #: 0 K/UL (ref 0–0.4)
ABSOLUTE IMMATURE GRANULOCYTE: ABNORMAL K/UL (ref 0–0.3)
ABSOLUTE LYMPH #: 0.17 K/UL (ref 1–4.8)
ABSOLUTE MONO #: 0.06 K/UL (ref 0.2–0.8)
AMORPHOUS: ABNORMAL
ANION GAP SERPL CALCULATED.3IONS-SCNC: 13 MMOL/L (ref 9–17)
BACTERIA: ABNORMAL
BASOPHILS # BLD: 0 %
BASOPHILS ABSOLUTE: 0 K/UL (ref 0–0.2)
BILIRUBIN URINE: NEGATIVE
BUN BLDV-MCNC: 16 MG/DL (ref 8–23)
BUN/CREAT BLD: 21 (ref 9–20)
CALCIUM SERPL-MCNC: 7.7 MG/DL (ref 8.6–10.4)
CASTS UA: ABNORMAL /LPF
CHLORIDE BLD-SCNC: 109 MMOL/L (ref 98–107)
CO2: 23 MMOL/L (ref 20–31)
COLOR: YELLOW
COMMENT UA: ABNORMAL
CREAT SERPL-MCNC: 0.77 MG/DL (ref 0.5–0.9)
CRYSTALS, UA: ABNORMAL /HPF
DIFFERENTIAL TYPE: ABNORMAL
EKG ATRIAL RATE: 97 BPM
EKG P AXIS: 52 DEGREES
EKG P-R INTERVAL: 124 MS
EKG Q-T INTERVAL: 328 MS
EKG QRS DURATION: 74 MS
EKG QTC CALCULATION (BAZETT): 416 MS
EKG R AXIS: 71 DEGREES
EKG T AXIS: -179 DEGREES
EKG VENTRICULAR RATE: 97 BPM
EOSINOPHILS RELATIVE PERCENT: 0 % (ref 1–4)
EPITHELIAL CELLS UA: ABNORMAL /HPF (ref 0–5)
GFR AFRICAN AMERICAN: >60 ML/MIN
GFR NON-AFRICAN AMERICAN: >60 ML/MIN
GFR SERPL CREATININE-BSD FRML MDRD: ABNORMAL ML/MIN/{1.73_M2}
GFR SERPL CREATININE-BSD FRML MDRD: ABNORMAL ML/MIN/{1.73_M2}
GLUCOSE BLD-MCNC: 155 MG/DL (ref 65–105)
GLUCOSE BLD-MCNC: 86 MG/DL (ref 65–105)
GLUCOSE BLD-MCNC: 98 MG/DL (ref 70–99)
GLUCOSE URINE: NEGATIVE
HCT VFR BLD CALC: 29.6 % (ref 36.3–47.1)
HEMOGLOBIN: 8.9 G/DL (ref 11.9–15.1)
IMMATURE GRANULOCYTES: ABNORMAL %
KETONES, URINE: ABNORMAL
LACTIC ACID, SEPSIS WHOLE BLOOD: NORMAL MMOL/L (ref 0.5–1.9)
LACTIC ACID, SEPSIS WHOLE BLOOD: NORMAL MMOL/L (ref 0.5–1.9)
LACTIC ACID, SEPSIS: 0.7 MMOL/L (ref 0.5–1.9)
LACTIC ACID, SEPSIS: 0.8 MMOL/L (ref 0.5–1.9)
LEUKOCYTE ESTERASE, URINE: NEGATIVE
LYMPHOCYTES # BLD: 6 % (ref 24–44)
MCH RBC QN AUTO: 34.5 PG (ref 25.2–33.5)
MCHC RBC AUTO-ENTMCNC: 30.1 G/DL (ref 28.4–34.8)
MCV RBC AUTO: 114.7 FL (ref 82.6–102.9)
MONOCYTES # BLD: 2 % (ref 1–7)
MORPHOLOGY: ABNORMAL
MUCUS: ABNORMAL
NITRITE, URINE: NEGATIVE
NRBC AUTOMATED: 0.7 PER 100 WBC
NUCLEATED RED BLOOD CELLS: 5 PER 100 WBC
OTHER OBSERVATIONS UA: ABNORMAL
PDW BLD-RTO: 17.3 % (ref 11.8–14.4)
PH UA: 5.5 (ref 5–8)
PLATELET # BLD: 107 K/UL (ref 138–453)
PLATELET ESTIMATE: ABNORMAL
PMV BLD AUTO: 11.1 FL (ref 8.1–13.5)
POTASSIUM SERPL-SCNC: 4.6 MMOL/L (ref 3.7–5.3)
PROTEIN UA: NEGATIVE
RBC # BLD: 2.58 M/UL (ref 3.95–5.11)
RBC # BLD: ABNORMAL 10*6/UL
RBC UA: ABNORMAL /HPF (ref 0–2)
RENAL EPITHELIAL, UA: ABNORMAL /HPF
SEG NEUTROPHILS: 92 % (ref 36–66)
SEGMENTED NEUTROPHILS ABSOLUTE COUNT: 2.67 K/UL (ref 1.8–7.7)
SODIUM BLD-SCNC: 145 MMOL/L (ref 135–144)
SPECIFIC GRAVITY UA: 1.02 (ref 1–1.03)
TRICHOMONAS: ABNORMAL
TURBIDITY: ABNORMAL
URINE HGB: NEGATIVE
UROBILINOGEN, URINE: NORMAL
WBC # BLD: 2.9 K/UL (ref 3.5–11.3)
WBC # BLD: ABNORMAL 10*3/UL
WBC UA: ABNORMAL /HPF (ref 0–5)
YEAST: ABNORMAL

## 2019-05-01 PROCEDURE — G0378 HOSPITAL OBSERVATION PER HR: HCPCS

## 2019-05-01 PROCEDURE — 6360000002 HC RX W HCPCS: Performed by: FAMILY MEDICINE

## 2019-05-01 PROCEDURE — 96375 TX/PRO/DX INJ NEW DRUG ADDON: CPT

## 2019-05-01 PROCEDURE — 81001 URINALYSIS AUTO W/SCOPE: CPT

## 2019-05-01 PROCEDURE — 83605 ASSAY OF LACTIC ACID: CPT

## 2019-05-01 PROCEDURE — 96376 TX/PRO/DX INJ SAME DRUG ADON: CPT

## 2019-05-01 PROCEDURE — 80048 BASIC METABOLIC PNL TOTAL CA: CPT

## 2019-05-01 PROCEDURE — 36415 COLL VENOUS BLD VENIPUNCTURE: CPT

## 2019-05-01 PROCEDURE — 6370000000 HC RX 637 (ALT 250 FOR IP): Performed by: NURSE PRACTITIONER

## 2019-05-01 PROCEDURE — 2580000003 HC RX 258: Performed by: FAMILY MEDICINE

## 2019-05-01 PROCEDURE — 6370000000 HC RX 637 (ALT 250 FOR IP): Performed by: INTERNAL MEDICINE

## 2019-05-01 PROCEDURE — 85025 COMPLETE CBC W/AUTO DIFF WBC: CPT

## 2019-05-01 PROCEDURE — 6370000000 HC RX 637 (ALT 250 FOR IP): Performed by: FAMILY MEDICINE

## 2019-05-01 PROCEDURE — 82947 ASSAY GLUCOSE BLOOD QUANT: CPT

## 2019-05-01 PROCEDURE — 94640 AIRWAY INHALATION TREATMENT: CPT

## 2019-05-01 PROCEDURE — 6360000002 HC RX W HCPCS: Performed by: INTERNAL MEDICINE

## 2019-05-01 RX ORDER — MORPHINE SULFATE 2 MG/ML
2 INJECTION, SOLUTION INTRAMUSCULAR; INTRAVENOUS EVERY 4 HOURS PRN
Status: DISCONTINUED | OUTPATIENT
Start: 2019-05-01 | End: 2019-05-01 | Stop reason: HOSPADM

## 2019-05-01 RX ORDER — NICOTINE 21 MG/24HR
1 PATCH, TRANSDERMAL 24 HOURS TRANSDERMAL DAILY
Status: DISCONTINUED | OUTPATIENT
Start: 2019-05-01 | End: 2019-05-01 | Stop reason: HOSPADM

## 2019-05-01 RX ADMIN — ISOSORBIDE MONONITRATE 60 MG: 60 TABLET ORAL at 08:17

## 2019-05-01 RX ADMIN — TIOTROPIUM BROMIDE 18 MCG: 18 CAPSULE ORAL; RESPIRATORY (INHALATION) at 06:29

## 2019-05-01 RX ADMIN — MORPHINE SULFATE 2 MG: 2 INJECTION, SOLUTION INTRAMUSCULAR; INTRAVENOUS at 19:11

## 2019-05-01 RX ADMIN — APIXABAN 5 MG: 5 TABLET, FILM COATED ORAL at 08:18

## 2019-05-01 RX ADMIN — Medication 3 ML: at 06:27

## 2019-05-01 RX ADMIN — Medication 1 TABLET: at 08:17

## 2019-05-01 RX ADMIN — FERROUS SULFATE TAB EC 325 MG (65 MG FE EQUIVALENT) 325 MG: 325 (65 FE) TABLET DELAYED RESPONSE at 11:10

## 2019-05-01 RX ADMIN — CARVEDILOL 6.25 MG: 3.12 TABLET, FILM COATED ORAL at 16:50

## 2019-05-01 RX ADMIN — FUROSEMIDE 20 MG: 20 TABLET ORAL at 08:18

## 2019-05-01 RX ADMIN — MORPHINE SULFATE 2 MG: 2 INJECTION, SOLUTION INTRAMUSCULAR; INTRAVENOUS at 15:06

## 2019-05-01 RX ADMIN — CLOPIDOGREL 75 MG: 75 TABLET, FILM COATED ORAL at 08:17

## 2019-05-01 RX ADMIN — MORPHINE SULFATE 2 MG: 2 INJECTION, SOLUTION INTRAMUSCULAR; INTRAVENOUS at 11:10

## 2019-05-01 RX ADMIN — CONJUGATED ESTROGENS 0.5 G: 0.62 CREAM VAGINAL at 08:18

## 2019-05-01 RX ADMIN — HYDROCODONE BITARTRATE AND ACETAMINOPHEN 1 TABLET: 5; 325 TABLET ORAL at 08:16

## 2019-05-01 RX ADMIN — Medication 3 ML: at 14:47

## 2019-05-01 RX ADMIN — Medication 3 ML: at 10:46

## 2019-05-01 RX ADMIN — CARVEDILOL 6.25 MG: 3.12 TABLET, FILM COATED ORAL at 08:17

## 2019-05-01 RX ADMIN — FERROUS SULFATE TAB EC 325 MG (65 MG FE EQUIVALENT) 325 MG: 325 (65 FE) TABLET DELAYED RESPONSE at 16:50

## 2019-05-01 RX ADMIN — MOMETASONE FUROATE AND FORMOTEROL FUMARATE DIHYDRATE 2 PUFF: 200; 5 AEROSOL RESPIRATORY (INHALATION) at 06:27

## 2019-05-01 RX ADMIN — Medication 500 UNITS: at 19:13

## 2019-05-01 RX ADMIN — LEVALBUTEROL HYDROCHLORIDE 1.25 MG: 1.25 SOLUTION, CONCENTRATE RESPIRATORY (INHALATION) at 14:45

## 2019-05-01 RX ADMIN — PANTOPRAZOLE SODIUM 40 MG: 40 TABLET, DELAYED RELEASE ORAL at 06:50

## 2019-05-01 RX ADMIN — ALPRAZOLAM 0.5 MG: 0.25 TABLET ORAL at 08:17

## 2019-05-01 RX ADMIN — CARBIDOPA AND LEVODOPA 2 TABLET: 25; 100 TABLET ORAL at 12:26

## 2019-05-01 RX ADMIN — LEVALBUTEROL HYDROCHLORIDE 1.25 MG: 1.25 SOLUTION, CONCENTRATE RESPIRATORY (INHALATION) at 06:27

## 2019-05-01 RX ADMIN — CARBIDOPA AND LEVODOPA 2 TABLET: 25; 100 TABLET ORAL at 16:50

## 2019-05-01 RX ADMIN — HYDROCODONE BITARTRATE AND ACETAMINOPHEN 1 TABLET: 5; 325 TABLET ORAL at 16:52

## 2019-05-01 RX ADMIN — GUAIFENESIN 1200 MG: 600 TABLET, EXTENDED RELEASE ORAL at 08:17

## 2019-05-01 RX ADMIN — CARBIDOPA AND LEVODOPA 2 TABLET: 25; 100 TABLET ORAL at 08:16

## 2019-05-01 RX ADMIN — ASPIRIN 81 MG 81 MG: 81 TABLET ORAL at 08:17

## 2019-05-01 RX ADMIN — ALPRAZOLAM 0.5 MG: 0.25 TABLET ORAL at 14:10

## 2019-05-01 RX ADMIN — AMLODIPINE BESYLATE 10 MG: 5 TABLET ORAL at 08:17

## 2019-05-01 RX ADMIN — POLYETHYLENE GLYCOL (3350) 17 G: 17 POWDER, FOR SOLUTION ORAL at 08:18

## 2019-05-01 RX ADMIN — GABAPENTIN 300 MG: 300 CAPSULE ORAL at 08:16

## 2019-05-01 RX ADMIN — DULOXETINE HYDROCHLORIDE 90 MG: 60 CAPSULE, DELAYED RELEASE ORAL at 08:16

## 2019-05-01 RX ADMIN — LEVALBUTEROL HYDROCHLORIDE 1.25 MG: 1.25 SOLUTION, CONCENTRATE RESPIRATORY (INHALATION) at 10:45

## 2019-05-01 RX ADMIN — VITAM B12 500 MCG: 100 TAB at 08:17

## 2019-05-01 RX ADMIN — LEVOTHYROXINE SODIUM 25 MCG: 25 TABLET ORAL at 06:50

## 2019-05-01 RX ADMIN — SODIUM CHLORIDE: 9 INJECTION, SOLUTION INTRAVENOUS at 00:00

## 2019-05-01 ASSESSMENT — PAIN DESCRIPTION - PAIN TYPE
TYPE: ACUTE PAIN;CHRONIC PAIN
TYPE: ACUTE PAIN;CHRONIC PAIN
TYPE: CHRONIC PAIN;ACUTE PAIN

## 2019-05-01 ASSESSMENT — PAIN SCALES - GENERAL
PAINLEVEL_OUTOF10: 9
PAINLEVEL_OUTOF10: 10
PAINLEVEL_OUTOF10: 9
PAINLEVEL_OUTOF10: 10
PAINLEVEL_OUTOF10: 8

## 2019-05-01 ASSESSMENT — PAIN DESCRIPTION - LOCATION
LOCATION: LEG
LOCATION: LEG

## 2019-05-01 NOTE — CARE COORDINATION
[employer] and [employer] have resolved the issue with the bed hold. [address] will admit pt tonight at 800. Notified nephew. He is agreeable. Arranged [facility] to transport at 730. Nurse to call report.  Medhat Clancy

## 2019-05-01 NOTE — PLAN OF CARE
Problem: Falls - Risk of:  Goal: Will remain free from falls  Description  Will remain free from falls  Outcome: Ongoing  Note:   Room free of clutter  Hourly rounding   Non-skid socks worn  Side rails up x2  Bed low and locked  Call light in reach  Instructed to call out before getting out of bed  Anticipatory needs met  Bed alarm on  Falling star at the door and on wristband       Problem: Risk for Impaired Skin Integrity  Goal: Tissue integrity - skin and mucous membranes  Description  Structural intactness and normal physiological function of skin and  mucous membranes.   Outcome: Ongoing  Note:   Skin assessment completed and documented  Theodore scale updated  Relieve pressure to bony prominences  Avoid shearing  Assess s/sx of infection   Turns side to side  Turned q 2 hours  Alessia care given and barrier cream applied to prevent breakdown  Heels elevated       Problem: Pain:  Goal: Pain level will decrease  Description  Pain level will decrease  Outcome: Ongoing  Note:   Pain level assessed and rated on a 0-10 scale  Assess characteristics of pain  PRN pain medication given per pt request  Non-pharmacological interventions implemented  Report ineffective pain management to physician  Update pt and family of any changes  Pt instructed to call out with new onset of pain  Continue to monitor no

## 2019-05-01 NOTE — CONSULTS
Deon Rios      Name: Isaias Umana  MRN: [id number]     Acct: [de-identified]  Room: [facility]-    Admit Date: 4/30/2019  PCP: Shakira Ventura DO    Physician Requesting Consult:  Dr. Jake Waite    Reason for Consult:  Severe bilateral lower extremity ischemia, left greater than right    Chief Complaint:     Chief Complaint   Patient presents with    Leg Pain         History Obtained From:     patient, electronic medical record    History of Present Illness:      Isaias Umana is a  70 y.o.  female who presents with Leg Pain    She has a history of severe peripheral arterial disease with near total occlusion of the infrarenal aorta and plaque extending throughout. Left iliac system is occluded with very high-grade multiple stenoses within the right iliac system. She has had prfevious failed femoral-femoral bypasses. She continued to smoke up to approximately 1 week ago. He states that the pain in the left leg is unbearable and it is starting on her right leg as well. Significant peripheral disease is present along with arterial disease involving both arms. She has severe COPD, congestive heart failure and atrial fibrillation a prohibitive cardiac risk. Past Medical History:     Past Medical History:   Diagnosis Date    Airway obstruction     Alcoholic polyneuropathy ([address]     Angina effort ([address]     Anxiety     Atrial fibrillation (HCC)     CAD (coronary artery disease)     s/p stents    Cancer (HCC)     breast, right    CHF (congestive heart failure) (HCC)     unspecified diastolic    Convulsions (HCC)     COPD (chronic obstructive pulmonary disease) (HCC)     Degenerative disc disease     Cervical    Depression     Diabetes mellitus ([address]     Esophageal reflux     Hypertension     unspecified    Inflammatory spondylopathy ([address]     Iron deficiency     Lymphoma ([address] 1993    MDRO (multiple drug resistant organisms) resistance 8/10/2014    E.  Coli urine    MRSA (methicillin resistant staph aureus) culture positive 07/13/2016    nares    Neuropathy     Osteoarthritis     [facility]'s [facility]     Peripheral vascular disease ([facility]     Restless leg syndrome     Sleep apnea     Transient cerebral ischemia     Unsteady gait         Past Surgical History:     Past Surgical History:   Procedure Laterality Date    BONE MARROW BIOPSY  11/29/2017    BREAST LUMPECTOMY      right     CHOLECYSTECTOMY      CORONARY ANGIOPLASTY WITH STENT PLACEMENT      unknown heart stents-1.5T MRI     FEMORAL-FEMORAL BYPASS GRAFT      HYSTERECTOMY      LUNG REMOVAL, PARTIAL      Lung CA     OTHER SURGICAL HISTORY  11/14/2018    attempted angiogram by Dr. Camille Stephenson. Unable to perform due to occlusion    OTHER SURGICAL HISTORY Right 04/23/2019     IR ANGIO RENAL SUPERSELECT INCLUSIVE RIGHT        Medications Prior to Admission:       Prior to Admission medications    Medication Sig Start Date End Date Taking? Authorizing Provider   furosemide (LASIX) 20 MG tablet Take 20 mg by mouth daily Indications: x 3 days starting 4/29/19   Yes Historical Provider, MD   hyoscyamine (LEVSIN/SL) 125 MCG sublingual tablet Place 125 mcg under the tongue 2 times daily as needed for Cramping   Yes Historical Provider, MD   acetaminophen (TYLENOL) 325 MG tablet Take 650 mg by mouth every 6 hours as needed for Pain    Historical Provider, MD   conjugated estrogens (PREMARIN) 0.625 MG/GM vaginal cream Place 0.5 g vaginally daily Place vaginally daily. 4/5/19   Po Farooq MD   Fluticasone Furoate-Vilanterol (BREO ELLIPTA) 200-25 MCG/INH AEPB Inhale 1 puff into the lungs daily    Historical Provider, MD   calcium carbonate-vitamin D3 (CALCIUM 600-D) 600-400 MG-UNIT TABS Take 1 tablet by mouth daily    Historical Provider, MD   ALPRAZolam Deisy Levo) 0.5 MG tablet Take 0.5 mg by mouth 3 times daily. Oneyda Lorenzo     Historical Provider, MD   amLODIPine (NORVASC) 10 MG tablet Take 10 mg by mouth daily Historical Provider, MD   ferrous sulfate 325 (65 Fe) MG tablet Take 325 mg by mouth 3 times daily     Historical Provider, MD   guaiFENesin (MUCINEX) 600 MG extended release tablet Take 1,200 mg by mouth 2 times daily    Historical Provider, MD   tiotropium (SPIRIVA RESPIMAT) 2.5 MCG/ACT AERS inhaler Inhale 2 puffs into the lungs daily    Historical Provider, MD   traZODone (DESYREL) 50 MG tablet Take 50 mg by mouth nightly    Historical Provider, MD   HYDROcodone-acetaminophen (NORCO) 5-325 MG per tablet Take 1 tablet by mouth every 6 hours as needed for Pain. Lord Pacheco     Historical Provider, MD   carbidopa-levodopa (SINEMET)  MG per tablet Take 2 tablets by mouth 4 times daily 8 am , 12 pm, 4 pm, 8 pm 2/4/19   RAHEEL Sarmiento - CNP   atorvastatin (LIPITOR) 40 MG tablet Take 1 tablet by mouth nightly 10/25/18   Russell Merida DO   mirtazapine (REMERON) 15 MG tablet Take 15 mg by mouth nightly    Historical Provider, MD   apixaban (ELIQUIS) 5 MG TABS tablet Take 1 tablet by mouth 2 times daily 8/28/18   Aissatou Meeks MD   clopidogrel (PLAVIX) 75 MG tablet Take 1 tablet by mouth daily 8/29/18   Aissatou Meeks MD   pantoprazole (PROTONIX) 40 MG tablet Take 40 mg by mouth daily    Historical Provider, MD   levothyroxine (SYNTHROID) 25 MCG tablet Take 25 mcg by mouth Daily    Historical Provider, MD   polyethylene glycol (MIRALAX) powder Take 17 g by mouth daily as needed     Historical Provider, MD   DULOXETINE HCL PO Take 90 mg by mouth daily Takes 30mg + 60mg caps (=90mg) once daily    Historical Provider, MD   budesonide (PULMICORT) 0.5 MG/2ML nebulizer suspension Take 1 ampule by nebulization every 12 hours as needed (COPD)    Historical Provider, MD   ipratropium (ATROVENT) 0.02 % nebulizer solution Take 0.5 mg by nebulization 4 times daily as needed (wheezing or SOB)    Historical Provider, MD   aspirin 81 MG chewable tablet Take 81 mg by mouth daily    Historical Provider, MD   vitamin B-12 Vitals:  BP (!) 120/47   Pulse 98   Temp 98.2 °F (36.8 °C) (Oral)   Resp 16   Ht 4' 11\" (1.499 m)   Wt 121 lb 11.2 oz (55.2 kg)   SpO2 96%   BMI 24.58 kg/m²   Temp (24hrs), Av.5 °F (36.9 °C), Min:97.9 °F (36.6 °C), Max:99.5 °F (37.5 °C)      General appearance - alert, well appearing and in no acute distress  Mental status - oriented to person, place and time with normal affect  Head - normocephalic and atraumatic  Eyes - pupils equal and reactive, extraocular eye movements intact, conjunctiva clear  Ears - hearing appears to be intact  Nose - no drainage noted  Mouth - mucous membranes moist  Neck - supple, no carotid bruits, thyroid not palpable, no JVD  Chest - clear to auscultation, normal effort  Heart - normal rate, regular rhythm, no murmurs  Abdomen - soft, non-tender, non-distended, bowel sounds present all four quadrants, no masses, hepatomegaly, splenomegaly or aortic enlargement  Neurological - normal speech, no focal findings or movement disorder noted, cranial nerves II through XII grossly intact  Extremities - peripheral pulses palpable, no pedal edema or calf pain with palpation  Skin - no gross lesions, rashes, or induration noted        R brachial 1+ L brachial 1+   R radial 0+ L radial 0+   R femoral 1+ L femoral 0+   R popliteal 0+ L popliteal 0+   R posterior tibial 0+ L posterior tibial 0+   R dorsalis pedis 0+ L dorsalis pedis 0+         Data:     Hematology:  Recent Labs     19  2030 19  1635 19  0616   WBC 3.8 3.6 2.9*   RBC 2.58* 2.35* 2.58*   HGB 8.9* 8.0* 8.9*   HCT 27.0* 26.6* 29.6*   .6* 113.2* 114.7*   MCH 34.3* 34.0* 34.5*   MCHC 32.8 30.1 30.1   RDW 17.8* 17.3* 17.3*   * 106* 107*   MPV 9.1 10.6 11.1     Chemistry:  Recent Labs     19  2030 19  1635 19  0616    143 145*   K 4.2 4.0 4.6   * 108* 109*   CO2 23 27 23   GLUCOSE 71 71 98   BUN 15 16 16   CREATININE 0.75 0.86 0.77   ANIONGAP 11 8* 13   LABGLOM >60 >60 >60   GFRAA >60 >60 >60   CALCIUM 7.8* 7.5* 7.7*   PROBNP  --  1,051*  --    TROPHS  --  41*  --    MYOGLOBIN  --  1,258*  --      Recent Labs     05/01/19  0618   POCGLU 86         Assessment:     Primary Problem  <principal problem not specified>  1. Severe multilevel peripheral arterial disease  2. Severe COPD  3. Congestive heart failure  4. Atrial fibrillation    Active Hospital Problems    Diagnosis Date Noted    [facility] exacerbation ([facility] [J44.1] 02/26/2018       Plan:     1. I will long discussion with her and Dr. Christian Amin. She has severe multilevel occlusive disease with minimal flow through the aorta. In her current condition I feel she is a prohibitive surgical risk for an aortobifemoral bypass. Axillobifemoral bypass is not a good option given her significant occlusive disease in the upper extremities. 2. Agree with hospice evaluation.       Electronically signed by Sahara Goins MD on 5/1/2019 at 9:59 AM     Copy sent to Dr. Violeta Penn DO

## 2019-05-01 NOTE — DISCHARGE INSTR - COC
([facility] J44.9    Iron deficiency anemia D50.9    Depression C63.1    Alcoholic peripheral neuropathy ([facility]) G62.1    Parkinson's disease ([facility] G20    Restless leg syndrome G25.81    Altered mental status R41.82    Pneumonia J18.9    Unstable angina ([facility]) I20.0    Leg swelling M79.89    Stenosis of right carotid artery - 70% right ICA I65.21    Thoracic back pain M54.6    Shortness of breath R06.02    Jaw pain - Suspected Anginal Equilvant R68.84    Abnormal stress test R94.39    Hypokalemia E87.6    Coronary artery disease involving native coronary artery with unstable angina pectoris ([facility]) I25.110    Essential hypertension I10    Acute cystitis P32.91    Metabolic encephalopathy S29.35    Intractable low back pain M54.5    Acute respiratory failure with hypoxia and hypercapnia ([facility]) J96.01, J96.02    Medication overdose T50.901A    Acute lower UTI (urinary tract infection) N39.0    Somnolence R40.0    Other dyspnea and respiratory abnormality R06.09, R09.89    COPD exacerbation ([facility]) J44.1    Chronic obstructive pulmonary disease with acute exacerbation ([facility]) J44.1    Hypoxemia R09.02    Macrocytic anemia D53.9    Frequent falls R29.6    Hypotension I95.9    Unresponsive episode R41.89    Encephalopathy G93.40    Acute respiratory failure with hypoxia ([facility]) J96.01    Hypoxia R09.02    Pancytopenia ([facility]) D61.818    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    Splenomegaly R16.1    Generalized abdominal pain R10.84    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    Chest pain radiating to upper extremity R07.89    Thrombocytopenia (HCC) D69.6    Hypocalcemia E83.51    Elevated brain natriuretic peptide (BNP) level T92.27    Alcoholic polyneuropathy (HCC) G62.1    Atrial fibrillation (HCC) I48.91    Cancer (HCC) C80.1    Convulsions (HCC) R56.9    Diabetes mellitus (HCC) E11.9    Sleep apnea G47.30    Falling episodes R29.6    Stenosis of right vertebral artery I65.01 Test): not done    Treatments at the Time of Hospital Discharge:   Respiratory Treatments: ***  Oxygen Therapy:  O2 at 2L at hs and prn  Ventilator:    - No ventilator support    Rehab Therapies: {THERAPEUTIC INTERVENTION:0018930290}  Weight Bearing Status/Restrictions: No weight bearing restirctions  Other Medical Equipment (for information only, NOT a DME order):  wheelchair, walker, bedside commode and hospital bed  Other Treatments: ***    Patient's personal belongings (please select all that are sent with patient):  {P DME Belongings:805246760}    RN SIGNATURE:  {Esignature:[id number]}    CASE MANAGEMENT/SOCIAL WORK SECTION    Inpatient Status Date: ***    Readmission Risk Assessment Score:  Readmission Risk              Risk of Unplanned Readmission:        31           Discharging to Facility/ Agency   · Name: [facility]up[facility] Martinez    · [address] [place] [address]  · Phone:[phone]  · [address] [facility] [place] [place] [facility]   · Name:  · Address:  · Dialysis Schedule:  · Phone:  · Fax:    / signature: Electronically signed by JOLANTA Light on 5/1/19 at 5:00 PM    PHYSICIAN SECTION    Prognosis: {Prognosis:2391716409}    Condition at Discharge: [address] Patient Condition:[id number]}    Rehab Potential (if transferring to Rehab): {Prognosis:2348154204}    Recommended Labs or Other Treatments After Discharge: ***    Physician Certification: I certify the above information and transfer of [place] Roca  is necessary for the continuing treatment of the diagnosis listed and that she requires {Admit to Appropriate Level of Care:07702} for {GREATER/LESS:385541312} 30 days.      Update Admission H&P: {P DME Changes in DUAJV:695242387}    PHYSICIAN SIGNATURE:  Electronically signed by Facundo Talley DO on 5/1/19 at 6:49 PM

## 2019-05-01 NOTE — PROGRESS NOTES
COPD - Admit Note by Lorin Murillo RN         In Progress: Reviewed on 2019 by Lorin Murillo RN         Created Using Review Status Review Entered   Epic In Primary 2019 11:50       Criteria Set Name - Subset   COPD - Admit Note       Criteria Review                                                                      Admission Review      19  [facility])  Clinical Case Management Department  Written by: Tree Briceño RN     Patient Name: Rico Drake  Attending Provider: Camilla Brower DO      Admit Date: 2019  MRN: [id number]                           : 1940     Patient Visit Status: Inpatient     Admission Source: ER       Order Question  Order Response    Patient Status: Inpatient [101]    Required Diagnosis: COPD exacerbation ([facility] [address]    Estimated LOS:  Estimated stay of more than 2 midnights   Attestation of 2 Midnights (Physician's Certification): The estimated time requirement will be met by the inpatient admission order written in accordance with the 2-midnight benchmark, supplemented by the physician notes and discharge planning instructions. The total estimated length of stay includes emergency department treatment, designated by first provider contact with the patient. Chief Complaint   Patient presents with    Leg Pain      HPI:  Pa Allison is a 70 y. o. female who presents to the emergency department via squad with fatigue, decreased appetite, frequent falls and bilateral leg pain.  She currently resides at the [facility] and is a DNRCC.  According to the work sent from her facility she has always been a fall risk but has been falling more frequently over the past week. Cassidy Starrdo been declining and she is eating less.  Patient complains of bilateral leg pain which is worse on the left. America Esquivel is unsure if she injured her legs on one of her falls.  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             Prior to Admission medications    Medication Sig Start Date End Date Taking? Authorizing Provider   furosemide (LASIX) 20 MG tablet Take 20 mg by mouth daily Indications: x 3 days starting 4/29/19     Yes Historical Provider, MD   hyoscyamine (LEVSIN/SL) 125 MCG sublingual tablet Place 125 mcg under the tongue 2 times daily as needed for Cramping     Yes Historical Provider, MD   acetaminophen (TYLENOL) 325 MG tablet Take 650 mg by mouth every 6 hours as needed for Pain       Historical Provider, MD   conjugated estrogens (PREMARIN) 0.625 MG/GM vaginal cream Place 0.5 g vaginally daily Place vaginally daily. 4/5/19     Ronel Overton MD   Fluticasone Furoate-Vilanterol (BREO ELLIPTA) 200-25 MCG/INH AEPB Inhale 1 puff into the lungs daily       Historical Provider, MD   calcium carbonate-vitamin D3 (CALCIUM 600-D) 600-400 MG-UNIT TABS Take 1 tablet by mouth daily       Historical Provider, MD   ALPRAZolam Saraen Fair) 0.5 MG tablet Take 0.5 mg by mouth 3 times daily. .       Historical Provider, MD   amLODIPine (NORVASC) 10 MG tablet Take 10 mg by mouth daily       Historical Provider, MD   ferrous sulfate 325 (65 Fe) MG tablet Take 325 mg by mouth 3 times daily        Historical Provider, MD   guaiFENesin (MUCINEX) 600 MG extended release tablet Take 1,200 mg by mouth 2 times daily       Historical Provider, MD   tiotropium (SPIRIVA RESPIMAT) 2.5 MCG/ACT AERS inhaler Inhale 2 puffs into the lungs daily       Historical Provider, MD   traZODone (DESYREL) 50 MG tablet Take 50 mg by mouth nightly       Historical Provider, MD   HYDROcodone-acetaminophen (NORCO) 5-325 MG per tablet Take 1 tablet by mouth every 6 hours as needed for Pain. .       Historical Provider, MD   carbidopa-levodopa (SINEMET)  MG per tablet Take 2 tablets by mouth 4 times daily 8 am , 12 pm, 4 pm, 8 pm 2/4/19     RAHEEL Sarmiento - CNP   atorvastatin (LIPITOR) 40 MG tablet Take 1 tablet by mouth nightly 10/25/18     Russell Merida DO   mirtazapine (REMERON) 15 MG tablet Take 15 mg by mouth nightly       Historical Provider, MD   apixaban (ELIQUIS) 5 MG TABS tablet Take 1 tablet by mouth 2 times daily 8/28/18     Natalie Sanchez MD   clopidogrel (PLAVIX) 75 MG tablet Take 1 tablet by mouth daily 8/29/18     Natalie Sanchez MD   pantoprazole (PROTONIX) 40 MG tablet Take 40 mg by mouth daily       Historical Provider, MD   levothyroxine (SYNTHROID) 25 MCG tablet Take 25 mcg by mouth Daily       Historical Provider, MD   polyethylene glycol (MIRALAX) powder Take 17 g by mouth daily as needed        Historical Provider, MD   DULOXETINE HCL PO Take 90 mg by mouth daily Takes 30mg + 60mg caps (=90mg) once daily       Historical Provider, MD   budesonide (PULMICORT) 0.5 MG/2ML nebulizer suspension Take 1 ampule by nebulization every 12 hours as needed (COPD)       Historical Provider, MD   ipratropium (ATROVENT) 0.02 % nebulizer solution Take 0.5 mg by nebulization 4 times daily as needed (wheezing or SOB)       Historical Provider, MD   aspirin 81 MG chewable tablet Take 81 mg by mouth daily       Historical Provider, MD   vitamin B-12 (CYANOCOBALAMIN) 500 MCG tablet Take 500 mcg by mouth daily       Historical Provider, MD   carvedilol (COREG) 6.25 MG tablet Take 6.25 mg by mouth 2 times daily (with meals)       Historical Provider, MD   gabapentin (NEURONTIN) 300 MG capsule Take 300 mg by mouth 2 times daily .       Historical Provider, MD   isosorbide mononitrate (IMDUR) 60 MG CR tablet Take 1 tablet by mouth daily. 3/25/15     Deny Murphy MD   nitroGLYCERIN (NITROSTAT) 0.4 MG SL tablet Place 1 tablet under the tongue every 5 minutes as needed for Chest pain.  12/18/13     Santiago Hines MD      First Vitals:  BP: (!) 130/52 (04/30/19 1526)   Temp: 97.9 °F (36.6 °C) (04/30/19 1526)  Pulse: 96 (04/30/19 1526)   Resp: 21 (04/30/19 1526)  SpO2: 93 % (04/30/19 1526)     Past Medical History:       Past Medical History:   Diagnosis Date    Airway obstruction      Alcoholic polyneuropathy ([facility] [address]      Angina effort ([facility] [address]      Anxiety      Atrial fibrillation (HCC)      CAD (coronary artery disease)       s/p stents    Cancer (HCC)       breast, right    CHF (congestive heart failure) (HCC)       unspecified diastolic    Convulsions (HCC)      COPD (chronic obstructive pulmonary disease) ([facility])      Degenerative disc disease       Cervical    Depression      Diabetes mellitus ([facility] [address]      Esophageal reflux      Hypertension       unspecified    Inflammatory spondylopathy ([facility])      Iron deficiency      Lymphoma ([facility] [address] 1993    MDRO (multiple drug resistant organisms) resistance 8/10/2014     E. Coli urine    MRSA (methicillin resistant staph aureus) culture positive 07/13/2016     nares    Neuropathy      Osteoarthritis      Parkinson's disease (HCC)      Peripheral vascular disease ([facility])      Restless leg syndrome      Sleep apnea      Transient cerebral ischemia      Unsteady gait        Physical Exam:  Constitutional: She is oriented to person, place, and time. She appears well-developed.   Frail appearing   HENT:   Head: Normocephalic and atraumatic. Mouth/Throat: Uvula is midline. Mucous membranes are dry. Eyes: Pupils are equal, round, and reactive to light. Conjunctivae and EOM are normal.   Neck: Normal range of motion. No spinous process tenderness present. Cardiovascular: Normal rate and regular rhythm. Exam reveals decreased pulses. Pulmonary/Chest: Effort normal and breath sounds normal. No respiratory distress. Abdominal: Soft. Bowel sounds are normal. She exhibits no distension. There is no tenderness. Musculoskeletal: Normal range of motion. She exhibits no edema, tenderness or deformity. Neurological: She is alert and oriented to person, place, and time. No cranial nerve deficit. Coordination normal.   Skin: Skin is dry.   Bilateral feet are cool to touch. Left foot is dusky in color   Psychiatric: She has a normal mood and affect. REPORTED   Casts UA NOT REPORTED   Mucus, UA NOT REPORTED   WBC, UA 2 TO 5   RBC, UA 0 TO 2   Epi Cells 5 TO 10   Renal Epithelial, Urine NOT REPORTED   Bacteria, UA MANY (A)   Amorphous, UA NOT REPORTED   Yeast, Urine NOT REPORTED   Crystals NOT REPORTED   Urine Hgb NEGATIVE   Trichomonas, UA NOT REPORTED   Other Observations UA NOT REPORTED      EKG:  Normal sinus rhythm  Nonspecific T wave abnormality  Abnormal ECG  When compared with ECG of 05-MAY-2018 14:56,  Fusion complexes are no longer Present  Premature ventricular complexes are no longer Present  Non-specific change in ST segment in Lateral leads  Nonspecific T wave abnormality now evident in Inferior leads  Nonspecific T wave abnormality, worse in Anterolateral leads     Diagnostics/Imaging:     Ct Head Wo Contrast  No acute intracranial abnormality.      Xr Chest Portable  Left basilar opacity could be due to atelectasis with possible small effusion. Developing pneumonia is a differential consideration.      ED Treatment:  Order Dose Route Action Action by     2019 1635 0.9 % sodium chloride bolus 500 mL Intravenous New Bag     2019 1923 0.9 % sodium chloride bolus 500 mL Intravenous New Bag     2019 1713 levalbuterol (XOPENEX) nebulizer solution 1.25 mg 1.25 mg Nebulization Given     2019 1838 methylPREDNISolone sodium (SOLU-MEDROL) injection 125 mg 125 mg Intravenous Given        Medical Decision Makin-year-old female who is afebrile and appears chronically ill.  Vital signs are stable on arrival. Zbigniew Olivia had an episode in the ER when her oxygen saturation went down to 77 with a good pleth but returned to the high 90s before intervention.  She did receive an aerosol treatment.  Feet are also cool to touch and I was unable to obtain pulses with a Doppler.  I was able to speak with her vascular surgeon who is aware of this chronic issue.  She will be admitted for further evaluation and treatment     Final Impression:  1.  COPD exacerbation ([facility] [address]    2. Acute on chronic respiratory failure with hypercapnia (HCC)       Admitted to:  Med Surg     Orders:  Pulmonary consult  Vascular consult  Hospice consult     IVs/Meds/Infusions/Blood:  Scheduled Meds:   sodium chloride flush  10 mL Intravenous 2 times per day    ALPRAZolam  0.5 mg Oral TID    amLODIPine  10 mg Oral Daily    apixaban  5 mg Oral BID    aspirin  81 mg Oral Daily    atorvastatin  40 mg Oral Nightly    calcium carb-cholecalciferol  1 tablet Oral Daily    carbidopa-levodopa  2 tablet Oral 4x Daily    carvedilol  6.25 mg Oral BID WC    clopidogrel  75 mg Oral Daily    conjugated estrogens  0.5 g Vaginal Daily    DULoxetine  90 mg Oral Daily    ferrous sulfate  325 mg Oral TID    furosemide  20 mg Oral Daily    gabapentin  300 mg Oral BID    guaiFENesin  1,200 mg Oral BID    isosorbide mononitrate  60 mg Oral Daily    levothyroxine  25 mcg Oral Daily    mirtazapine  15 mg Oral Nightly    pantoprazole  40 mg Oral QAM AC    polyethylene glycol  17 g Oral Daily    traZODone  50 mg Oral Nightly    vitamin B-12  500 mcg Oral Daily    sodium chloride nebulizer  3 mL Nebulization Q4H While awake    levalbuterol  1.25 mg Nebulization Q4H While awake    tiotropium  18 mcg Inhalation Daily    mometasone-formoterol  2 puff Inhalation BID      Continuous Infusions:   sodium chloride 50 mL/hr at 05/01/19 0000      PRN Meds:.morphine, sodium chloride nebulizer, sodium chloride flush, acetaminophen, budesonide, hyoscyamine, ipratropium, nitroGLYCERIN, HYDROcodone-acetaminophen     Family Med:  Assessment. ..  1. COPD exacerbation  2. Acute on chronic respiratory failure with hypercapnia  Plan. ... 1. Start morphine  2. Consult hospice     Discharge Plan  Dr. Jim Priest rounded and discussed Hospice with patient for symptom management for PVD. Pt is having severe lt leg pain. Pt is long term resident at [facility]. Pt agreeable to Hospice [facility] and is [facility].   Referral called to Derek More at [facility] and nurse will be here within the hour. Rachael Cris called and informed and he will not be here but would like us to proceed with Hospice referral.  Continue to follow S/P Hospice evaluation.       Vascular:  Assessment. ..  1. Severe multilevel peripheral arterial disease  2. Severe COPD  3. Congestive heart failure  4. Atrial fibrillation  Plan. ... 1. I will long discussion with her and Dr. Laury Scott. Savanna Walker has severe multilevel occlusive disease with minimal flow through the aorta.  In her current condition I feel she is a prohibitive surgical risk for an aortobifemoral bypass.  Axillobifemoral bypass is not a good option given her significant occlusive disease in the upper extremities. 2. Agree with hospice evaluation.        AMAN MOLINA                  Attending physician agrees with observation and order has been entered.

## 2019-05-02 NOTE — TELEPHONE ENCOUNTER
Patient was rescheduled for 5/24 at 8:30AM.  [facility] to let them know and nurse stated to cancel procedure due to patient is in hospice. Surgery cancelled today.

## 2019-05-02 NOTE — PROGRESS NOTES
Attempted to call report again to [facility], was told will call back if they have any questions

## 2019-05-02 NOTE — PROGRESS NOTES
PAtient discharged off unit to [facility] via [facility] [address]. All belongings and paperwork with transport.

## 2019-05-03 ENCOUNTER — HOSPITAL ENCOUNTER (OUTPATIENT)
Age: 79
Setting detail: SPECIMEN
Discharge: HOME | End: 2019-05-03
Payer: MEDICARE

## 2019-05-07 LAB
CULTURE: NORMAL
CULTURE: NORMAL
Lab: NORMAL
Lab: NORMAL
SPECIMEN DESCRIPTION: NORMAL
SPECIMEN DESCRIPTION: NORMAL

## 2019-05-10 ENCOUNTER — TELEPHONE (OUTPATIENT)
Dept: NEUROLOGY | Age: 79
End: 2019-05-10
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
